# Patient Record
Sex: FEMALE | Race: WHITE | NOT HISPANIC OR LATINO | Employment: OTHER | URBAN - METROPOLITAN AREA
[De-identification: names, ages, dates, MRNs, and addresses within clinical notes are randomized per-mention and may not be internally consistent; named-entity substitution may affect disease eponyms.]

---

## 2017-03-09 ENCOUNTER — AMBULATORY - HEALTHEAST (OUTPATIENT)
Dept: CARDIOLOGY | Facility: CLINIC | Age: 62
End: 2017-03-09

## 2017-03-09 DIAGNOSIS — Z95.0 PACEMAKER: ICD-10-CM

## 2017-04-12 ENCOUNTER — AMBULATORY - HEALTHEAST (OUTPATIENT)
Dept: CARDIOLOGY | Facility: CLINIC | Age: 62
End: 2017-04-12

## 2017-04-12 DIAGNOSIS — Z95.0 PACEMAKER: ICD-10-CM

## 2017-04-18 ENCOUNTER — AMBULATORY - HEALTHEAST (OUTPATIENT)
Dept: CARDIOLOGY | Facility: CLINIC | Age: 62
End: 2017-04-18

## 2017-04-18 ENCOUNTER — OFFICE VISIT - HEALTHEAST (OUTPATIENT)
Dept: CARDIOLOGY | Facility: CLINIC | Age: 62
End: 2017-04-18

## 2017-04-18 DIAGNOSIS — I44.7 OTHER LEFT BUNDLE BRANCH BLOCK: ICD-10-CM

## 2017-04-18 DIAGNOSIS — I44.2 ATRIOVENTRICULAR BLOCK, COMPLETE (H): ICD-10-CM

## 2017-04-18 DIAGNOSIS — Z95.0 CARDIAC PACEMAKER IN SITU: ICD-10-CM

## 2017-04-18 DIAGNOSIS — R55 SYNCOPAL EPISODES: ICD-10-CM

## 2017-04-18 DIAGNOSIS — I10 ESSENTIAL HYPERTENSION WITH GOAL BLOOD PRESSURE LESS THAN 130/80: ICD-10-CM

## 2017-04-18 DIAGNOSIS — E78.00 HYPERCHOLESTEREMIA: ICD-10-CM

## 2017-04-18 ASSESSMENT — MIFFLIN-ST. JEOR: SCORE: 1407.02

## 2017-07-26 ENCOUNTER — AMBULATORY - HEALTHEAST (OUTPATIENT)
Dept: CARDIOLOGY | Facility: CLINIC | Age: 62
End: 2017-07-26

## 2017-07-26 DIAGNOSIS — Z95.0 CARDIAC PACEMAKER IN SITU: ICD-10-CM

## 2017-07-27 LAB — HCC DEVICE COMMENTS: NORMAL

## 2017-11-02 ENCOUNTER — AMBULATORY - HEALTHEAST (OUTPATIENT)
Dept: CARDIOLOGY | Facility: CLINIC | Age: 62
End: 2017-11-02

## 2017-11-02 DIAGNOSIS — Z95.0 CARDIAC PACEMAKER IN SITU: ICD-10-CM

## 2017-11-03 LAB — HCC DEVICE COMMENTS: NORMAL

## 2017-11-20 ENCOUNTER — RECORDS - HEALTHEAST (OUTPATIENT)
Dept: LAB | Facility: HOSPITAL | Age: 62
End: 2017-11-20

## 2017-11-20 LAB — AST SERPL W P-5'-P-CCNC: 22 U/L (ref 0–40)

## 2018-02-14 ENCOUNTER — AMBULATORY - HEALTHEAST (OUTPATIENT)
Dept: CARDIOLOGY | Facility: CLINIC | Age: 63
End: 2018-02-14

## 2018-02-14 DIAGNOSIS — Z95.0 CARDIAC PACEMAKER IN SITU: ICD-10-CM

## 2018-02-15 LAB — HCC DEVICE COMMENTS: NORMAL

## 2018-03-28 ENCOUNTER — RECORDS - HEALTHEAST (OUTPATIENT)
Dept: LAB | Facility: HOSPITAL | Age: 63
End: 2018-03-28

## 2018-03-28 LAB — PHENYTOIN SERPL-MCNC: 13.2 UG/ML (ref 10–20)

## 2018-05-01 ENCOUNTER — RECORDS - HEALTHEAST (OUTPATIENT)
Dept: ADMINISTRATIVE | Facility: OTHER | Age: 63
End: 2018-05-01

## 2018-05-07 ENCOUNTER — OFFICE VISIT - HEALTHEAST (OUTPATIENT)
Dept: CARDIOLOGY | Facility: CLINIC | Age: 63
End: 2018-05-07

## 2018-05-07 ENCOUNTER — AMBULATORY - HEALTHEAST (OUTPATIENT)
Dept: CARDIOLOGY | Facility: CLINIC | Age: 63
End: 2018-05-07

## 2018-05-07 DIAGNOSIS — I10 ESSENTIAL HYPERTENSION: ICD-10-CM

## 2018-05-07 DIAGNOSIS — I44.2 ATRIOVENTRICULAR BLOCK, COMPLETE (H): ICD-10-CM

## 2018-05-07 DIAGNOSIS — Z95.0 CARDIAC PACEMAKER IN SITU: ICD-10-CM

## 2018-05-07 DIAGNOSIS — E78.00 HYPERCHOLESTEREMIA: ICD-10-CM

## 2018-05-07 DIAGNOSIS — R55 SYNCOPAL EPISODES: ICD-10-CM

## 2018-05-07 LAB
HCC DEVICE COMMENTS: NORMAL
HCC DEVICE IMPLANTING PROVIDER: NORMAL
HCC DEVICE MANUFACTURE: NORMAL
HCC DEVICE MODEL: NORMAL
HCC DEVICE SERIAL NUMBER: NORMAL
HCC DEVICE TYPE: NORMAL

## 2018-05-07 ASSESSMENT — MIFFLIN-ST. JEOR: SCORE: 1441.26

## 2018-08-16 ENCOUNTER — AMBULATORY - HEALTHEAST (OUTPATIENT)
Dept: CARDIOLOGY | Facility: CLINIC | Age: 63
End: 2018-08-16

## 2018-08-16 DIAGNOSIS — Z95.0 CARDIAC PACEMAKER IN SITU: ICD-10-CM

## 2018-09-17 ENCOUNTER — RECORDS - HEALTHEAST (OUTPATIENT)
Dept: LAB | Facility: HOSPITAL | Age: 63
End: 2018-09-17

## 2018-09-17 LAB
LEVETIRACETAM (KEPPRA): 8.4 UG/ML (ref 6–46)
PHENYTOIN SERPL-MCNC: 6.4 UG/ML (ref 10–20)

## 2018-11-19 ENCOUNTER — RECORDS - HEALTHEAST (OUTPATIENT)
Dept: LAB | Facility: CLINIC | Age: 63
End: 2018-11-19

## 2018-11-20 LAB
ALBUMIN SERPL-MCNC: 3.7 G/DL (ref 3.5–5)
ALP SERPL-CCNC: 130 U/L (ref 45–120)
ALT SERPL W P-5'-P-CCNC: 19 U/L (ref 0–45)
ANION GAP SERPL CALCULATED.3IONS-SCNC: 9 MMOL/L (ref 5–18)
AST SERPL W P-5'-P-CCNC: 26 U/L (ref 0–40)
BASOPHILS # BLD AUTO: 0 THOU/UL (ref 0–0.2)
BASOPHILS NFR BLD AUTO: 1 % (ref 0–2)
BILIRUB SERPL-MCNC: 0.5 MG/DL (ref 0–1)
BUN SERPL-MCNC: 14 MG/DL (ref 8–22)
CALCIUM SERPL-MCNC: 9.7 MG/DL (ref 8.5–10.5)
CHLORIDE BLD-SCNC: 102 MMOL/L (ref 98–107)
CHOLEST SERPL-MCNC: 248 MG/DL
CO2 SERPL-SCNC: 26 MMOL/L (ref 22–31)
CREAT SERPL-MCNC: 0.73 MG/DL (ref 0.6–1.1)
EOSINOPHIL # BLD AUTO: 0.4 THOU/UL (ref 0–0.4)
EOSINOPHIL NFR BLD AUTO: 6 % (ref 0–6)
ERYTHROCYTE [DISTWIDTH] IN BLOOD BY AUTOMATED COUNT: 13.2 % (ref 11–14.5)
FASTING STATUS PATIENT QL REPORTED: NO
GFR SERPL CREATININE-BSD FRML MDRD: >60 ML/MIN/1.73M2
GLUCOSE BLD-MCNC: 109 MG/DL (ref 70–125)
HCT VFR BLD AUTO: 40.9 % (ref 35–47)
HDLC SERPL-MCNC: 54 MG/DL
HGB BLD-MCNC: 13.7 G/DL (ref 12–16)
LDLC SERPL CALC-MCNC: 158 MG/DL
LEVETIRACETAM (KEPPRA): 9 UG/ML (ref 6–46)
LYMPHOCYTES # BLD AUTO: 2.4 THOU/UL (ref 0.8–4.4)
LYMPHOCYTES NFR BLD AUTO: 34 % (ref 20–40)
MCH RBC QN AUTO: 31.6 PG (ref 27–34)
MCHC RBC AUTO-ENTMCNC: 33.5 G/DL (ref 32–36)
MCV RBC AUTO: 94 FL (ref 80–100)
MONOCYTES # BLD AUTO: 0.6 THOU/UL (ref 0–0.9)
MONOCYTES NFR BLD AUTO: 8 % (ref 2–10)
NEUTROPHILS # BLD AUTO: 3.6 THOU/UL (ref 2–7.7)
NEUTROPHILS NFR BLD AUTO: 51 % (ref 50–70)
PHENYTOIN SERPL-MCNC: 5.3 UG/ML (ref 10–20)
PLATELET # BLD AUTO: 193 THOU/UL (ref 140–440)
PMV BLD AUTO: 10.1 FL (ref 8.5–12.5)
POTASSIUM BLD-SCNC: 3 MMOL/L (ref 3.5–5)
PROT SERPL-MCNC: 6.8 G/DL (ref 6–8)
RBC # BLD AUTO: 4.34 MILL/UL (ref 3.8–5.4)
SODIUM SERPL-SCNC: 137 MMOL/L (ref 136–145)
TRIGL SERPL-MCNC: 179 MG/DL
TSH SERPL DL<=0.005 MIU/L-ACNC: 14.84 UIU/ML (ref 0.3–5)
WBC: 7 THOU/UL (ref 4–11)

## 2018-11-26 ENCOUNTER — RECORDS - HEALTHEAST (OUTPATIENT)
Dept: LAB | Facility: CLINIC | Age: 63
End: 2018-11-26

## 2018-11-26 LAB
C DIFF TOX B STL QL: NEGATIVE
RIBOTYPE 027/NAP1/BI: NORMAL

## 2018-11-27 ENCOUNTER — AMBULATORY - HEALTHEAST (OUTPATIENT)
Dept: CARDIOLOGY | Facility: CLINIC | Age: 63
End: 2018-11-27

## 2018-11-27 DIAGNOSIS — Z95.0 CARDIAC PACEMAKER IN SITU: ICD-10-CM

## 2018-11-27 LAB
O+P STL MICRO: NORMAL
SHIGA TOXIN 1: NEGATIVE
SHIGA TOXIN 2: NEGATIVE

## 2018-11-29 LAB — BACTERIA SPEC CULT: NORMAL

## 2019-03-06 ENCOUNTER — AMBULATORY - HEALTHEAST (OUTPATIENT)
Dept: CARDIOLOGY | Facility: CLINIC | Age: 64
End: 2019-03-06

## 2019-03-06 DIAGNOSIS — Z95.0 CARDIAC PACEMAKER IN SITU: ICD-10-CM

## 2019-04-09 ENCOUNTER — AMBULATORY - HEALTHEAST (OUTPATIENT)
Dept: CARDIOLOGY | Facility: CLINIC | Age: 64
End: 2019-04-09

## 2019-04-09 DIAGNOSIS — Z95.0 CARDIAC PACEMAKER IN SITU: ICD-10-CM

## 2019-04-29 ENCOUNTER — RECORDS - HEALTHEAST (OUTPATIENT)
Dept: LAB | Facility: CLINIC | Age: 64
End: 2019-04-29

## 2019-04-29 LAB
ALBUMIN SERPL-MCNC: 3.8 G/DL (ref 3.5–5)
ALP SERPL-CCNC: 121 U/L (ref 45–120)
ALT SERPL W P-5'-P-CCNC: 19 U/L (ref 0–45)
ANION GAP SERPL CALCULATED.3IONS-SCNC: 13 MMOL/L (ref 5–18)
AST SERPL W P-5'-P-CCNC: 20 U/L (ref 0–40)
BASOPHILS # BLD AUTO: 0 THOU/UL (ref 0–0.2)
BASOPHILS NFR BLD AUTO: 0 % (ref 0–2)
BILIRUB SERPL-MCNC: 0.3 MG/DL (ref 0–1)
BUN SERPL-MCNC: 18 MG/DL (ref 8–22)
CALCIUM SERPL-MCNC: 10.3 MG/DL (ref 8.5–10.5)
CHLORIDE BLD-SCNC: 107 MMOL/L (ref 98–107)
CHOLEST SERPL-MCNC: 183 MG/DL
CO2 SERPL-SCNC: 22 MMOL/L (ref 22–31)
CREAT SERPL-MCNC: 0.76 MG/DL (ref 0.6–1.1)
EOSINOPHIL # BLD AUTO: 0.3 THOU/UL (ref 0–0.4)
EOSINOPHIL NFR BLD AUTO: 7 % (ref 0–6)
ERYTHROCYTE [DISTWIDTH] IN BLOOD BY AUTOMATED COUNT: 13 % (ref 11–14.5)
FASTING STATUS PATIENT QL REPORTED: YES
GFR SERPL CREATININE-BSD FRML MDRD: >60 ML/MIN/1.73M2
GLUCOSE BLD-MCNC: 111 MG/DL (ref 70–125)
HCT VFR BLD AUTO: 41 % (ref 35–47)
HDLC SERPL-MCNC: 44 MG/DL
HGB BLD-MCNC: 13.5 G/DL (ref 12–16)
LDLC SERPL CALC-MCNC: 112 MG/DL
LEVETIRACETAM (KEPPRA): 23.4 UG/ML (ref 6–46)
LYMPHOCYTES # BLD AUTO: 1.6 THOU/UL (ref 0.8–4.4)
LYMPHOCYTES NFR BLD AUTO: 33 % (ref 20–40)
MCH RBC QN AUTO: 30.8 PG (ref 27–34)
MCHC RBC AUTO-ENTMCNC: 32.9 G/DL (ref 32–36)
MCV RBC AUTO: 93 FL (ref 80–100)
MONOCYTES # BLD AUTO: 0.4 THOU/UL (ref 0–0.9)
MONOCYTES NFR BLD AUTO: 8 % (ref 2–10)
NEUTROPHILS # BLD AUTO: 2.6 THOU/UL (ref 2–7.7)
NEUTROPHILS NFR BLD AUTO: 53 % (ref 50–70)
PHENYTOIN SERPL-MCNC: 9.9 UG/ML (ref 10–20)
PLATELET # BLD AUTO: 175 THOU/UL (ref 140–440)
PMV BLD AUTO: 10.5 FL (ref 8.5–12.5)
POTASSIUM BLD-SCNC: 3.8 MMOL/L (ref 3.5–5)
PROT SERPL-MCNC: 6.6 G/DL (ref 6–8)
RBC # BLD AUTO: 4.39 MILL/UL (ref 3.8–5.4)
SODIUM SERPL-SCNC: 142 MMOL/L (ref 136–145)
TRIGL SERPL-MCNC: 133 MG/DL
TSH SERPL DL<=0.005 MIU/L-ACNC: <0.01 UIU/ML (ref 0.3–5)
WBC: 5 THOU/UL (ref 4–11)

## 2019-05-06 LAB
HPV SOURCE: NORMAL
HUMAN PAPILLOMA VIRUS 16 DNA: NEGATIVE
HUMAN PAPILLOMA VIRUS 18 DNA: NEGATIVE
HUMAN PAPILLOMA VIRUS FINAL DIAGNOSIS: NORMAL
HUMAN PAPILLOMA VIRUS OTHER HR: NEGATIVE
SPECIMEN DESCRIPTION: NORMAL

## 2019-05-10 ENCOUNTER — RECORDS - HEALTHEAST (OUTPATIENT)
Dept: ADMINISTRATIVE | Facility: OTHER | Age: 64
End: 2019-05-10

## 2019-05-10 LAB
BKR LAB AP ABNORMAL BLEEDING: NO
BKR LAB AP BIRTH CONTROL/HORMONES: ABNORMAL
BKR LAB AP CERVICAL APPEARANCE: NORMAL
BKR LAB AP GYN ADEQUACY: ABNORMAL
BKR LAB AP GYN INTERPRETATION: ABNORMAL
BKR LAB AP HPV REFLEX: ABNORMAL
BKR LAB AP LMP: ABNORMAL
BKR LAB AP PATIENT STATUS: NO
BKR LAB AP PREVIOUS ABNORMAL: ABNORMAL
BKR LAB AP PREVIOUS NORMAL: ABNORMAL
HIGH RISK?: NO
INTERPRETING LAB: ABNORMAL
PATH REPORT.COMMENTS IMP SPEC: ABNORMAL
RESULT FLAG (HE HISTORICAL CONVERSION): ABNORMAL

## 2019-05-13 ENCOUNTER — AMBULATORY - HEALTHEAST (OUTPATIENT)
Dept: CARDIOLOGY | Facility: CLINIC | Age: 64
End: 2019-05-13

## 2019-05-13 DIAGNOSIS — Z95.0 CARDIAC PACEMAKER IN SITU: ICD-10-CM

## 2019-05-24 ENCOUNTER — RECORDS - HEALTHEAST (OUTPATIENT)
Dept: ADMINISTRATIVE | Facility: OTHER | Age: 64
End: 2019-05-24

## 2019-05-24 LAB
LAB AP CHARGES (HE HISTORICAL CONVERSION): NORMAL
PATH REPORT.COMMENTS IMP SPEC: NORMAL
PATH REPORT.COMMENTS IMP SPEC: NORMAL
PATH REPORT.FINAL DX SPEC: NORMAL
PATH REPORT.GROSS SPEC: NORMAL
PATH REPORT.MICROSCOPIC SPEC OTHER STN: NORMAL
PATH REPORT.RELEVANT HX SPEC: NORMAL
RESULT FLAG (HE HISTORICAL CONVERSION): NORMAL

## 2019-06-05 ENCOUNTER — AMBULATORY - HEALTHEAST (OUTPATIENT)
Dept: CARDIOLOGY | Facility: CLINIC | Age: 64
End: 2019-06-05

## 2019-06-05 ENCOUNTER — RECORDS - HEALTHEAST (OUTPATIENT)
Dept: ADMINISTRATIVE | Facility: OTHER | Age: 64
End: 2019-06-05

## 2019-06-11 ENCOUNTER — AMBULATORY - HEALTHEAST (OUTPATIENT)
Dept: CARDIOLOGY | Facility: CLINIC | Age: 64
End: 2019-06-11

## 2019-06-11 ENCOUNTER — OFFICE VISIT - HEALTHEAST (OUTPATIENT)
Dept: CARDIOLOGY | Facility: CLINIC | Age: 64
End: 2019-06-11

## 2019-06-11 DIAGNOSIS — Z95.0 CARDIAC PACEMAKER IN SITU: ICD-10-CM

## 2019-06-11 DIAGNOSIS — I10 ESSENTIAL HYPERTENSION: ICD-10-CM

## 2019-06-11 DIAGNOSIS — I44.2 ATRIOVENTRICULAR BLOCK, COMPLETE (H): ICD-10-CM

## 2019-06-11 DIAGNOSIS — G47.33 OBSTRUCTIVE SLEEP APNEA (ADULT) (PEDIATRIC): ICD-10-CM

## 2019-06-11 DIAGNOSIS — E78.00 HYPERCHOLESTEREMIA: ICD-10-CM

## 2019-06-11 ASSESSMENT — MIFFLIN-ST. JEOR: SCORE: 1364.15

## 2019-06-12 ENCOUNTER — HOSPITAL ENCOUNTER (OUTPATIENT)
Dept: MAMMOGRAPHY | Facility: CLINIC | Age: 64
Discharge: HOME | End: 2019-06-12
Attending: FAMILY MEDICINE

## 2019-06-12 DIAGNOSIS — Z12.31 VISIT FOR SCREENING MAMMOGRAM: ICD-10-CM

## 2019-07-10 ENCOUNTER — AMBULATORY - HEALTHEAST (OUTPATIENT)
Dept: CARDIOLOGY | Facility: CLINIC | Age: 64
End: 2019-07-10

## 2019-07-10 DIAGNOSIS — Z95.0 CARDIAC PACEMAKER IN SITU: ICD-10-CM

## 2019-07-11 ENCOUNTER — AMBULATORY - HEALTHEAST (OUTPATIENT)
Dept: CARDIOLOGY | Facility: CLINIC | Age: 64
End: 2019-07-11

## 2019-07-11 ENCOUNTER — COMMUNICATION - HEALTHEAST (OUTPATIENT)
Dept: CARDIOLOGY | Facility: CLINIC | Age: 64
End: 2019-07-11

## 2019-07-11 ENCOUNTER — SURGERY - HEALTHEAST (OUTPATIENT)
Dept: CARDIOLOGY | Facility: CLINIC | Age: 64
End: 2019-07-11

## 2019-07-11 DIAGNOSIS — Z45.010 PACEMAKER BATTERY DEPLETION: ICD-10-CM

## 2019-07-12 ENCOUNTER — COMMUNICATION - HEALTHEAST (OUTPATIENT)
Dept: CARDIOLOGY | Facility: CLINIC | Age: 64
End: 2019-07-12

## 2019-07-12 ENCOUNTER — SURGERY - HEALTHEAST (OUTPATIENT)
Dept: CARDIOLOGY | Facility: CLINIC | Age: 64
End: 2019-07-12

## 2019-07-12 ENCOUNTER — RECORDS - HEALTHEAST (OUTPATIENT)
Dept: LAB | Facility: CLINIC | Age: 64
End: 2019-07-12

## 2019-07-12 LAB
POTASSIUM BLD-SCNC: 3.9 MMOL/L (ref 3.5–5)
TSH SERPL DL<=0.005 MIU/L-ACNC: 0.02 UIU/ML (ref 0.3–5)

## 2019-07-15 ENCOUNTER — AMBULATORY - HEALTHEAST (OUTPATIENT)
Dept: CARDIOLOGY | Facility: CLINIC | Age: 64
End: 2019-07-15

## 2019-07-15 ENCOUNTER — SURGERY - HEALTHEAST (OUTPATIENT)
Dept: CARDIOLOGY | Facility: CLINIC | Age: 64
End: 2019-07-15

## 2019-07-15 ASSESSMENT — MIFFLIN-ST. JEOR: SCORE: 1363.25

## 2019-07-18 ENCOUNTER — COMMUNICATION - HEALTHEAST (OUTPATIENT)
Dept: CARDIOLOGY | Facility: CLINIC | Age: 64
End: 2019-07-18

## 2019-07-22 ENCOUNTER — AMBULATORY - HEALTHEAST (OUTPATIENT)
Dept: CARDIOLOGY | Facility: CLINIC | Age: 64
End: 2019-07-22

## 2019-07-22 DIAGNOSIS — Z95.0 CARDIAC PACEMAKER IN SITU: ICD-10-CM

## 2019-07-22 ASSESSMENT — MIFFLIN-ST. JEOR: SCORE: 1360.75

## 2019-07-23 ENCOUNTER — COMMUNICATION - HEALTHEAST (OUTPATIENT)
Dept: CARDIOLOGY | Facility: CLINIC | Age: 64
End: 2019-07-23

## 2019-07-31 ENCOUNTER — COMMUNICATION - HEALTHEAST (OUTPATIENT)
Dept: CARDIOLOGY | Facility: CLINIC | Age: 64
End: 2019-07-31

## 2019-08-02 ENCOUNTER — SURGERY - HEALTHEAST (OUTPATIENT)
Dept: CARDIOLOGY | Facility: CLINIC | Age: 64
End: 2019-08-02

## 2019-09-27 ENCOUNTER — RECORDS - HEALTHEAST (OUTPATIENT)
Dept: ADMINISTRATIVE | Facility: OTHER | Age: 64
End: 2019-09-27

## 2019-10-15 ENCOUNTER — AMBULATORY - HEALTHEAST (OUTPATIENT)
Dept: CARDIOLOGY | Facility: CLINIC | Age: 64
End: 2019-10-15

## 2019-10-15 DIAGNOSIS — Z95.0 CARDIAC PACEMAKER IN SITU: ICD-10-CM

## 2019-10-16 ENCOUNTER — AMBULATORY - HEALTHEAST (OUTPATIENT)
Dept: PHYSICAL MEDICINE AND REHAB | Facility: CLINIC | Age: 64
End: 2019-10-16

## 2019-10-16 ENCOUNTER — RECORDS - HEALTHEAST (OUTPATIENT)
Dept: ADMINISTRATIVE | Facility: OTHER | Age: 64
End: 2019-10-16

## 2019-10-16 DIAGNOSIS — M54.6 ACUTE MIDLINE THORACIC BACK PAIN: ICD-10-CM

## 2019-11-08 ENCOUNTER — HOSPITAL ENCOUNTER (OUTPATIENT)
Dept: PHYSICAL MEDICINE AND REHAB | Facility: CLINIC | Age: 64
Discharge: HOME | End: 2019-11-08
Attending: FAMILY MEDICINE

## 2019-11-08 DIAGNOSIS — R29.898 WEAKNESS OF BOTH LOWER EXTREMITIES: ICD-10-CM

## 2019-11-08 DIAGNOSIS — Z98.1 HISTORY OF LUMBAR FUSION: ICD-10-CM

## 2019-11-08 DIAGNOSIS — M54.14 RADICULAR PAIN OF THORACIC REGION: ICD-10-CM

## 2019-11-08 DIAGNOSIS — M51.24 THORACIC DISC HERNIATION: ICD-10-CM

## 2019-11-08 DIAGNOSIS — R26.89 BALANCE PROBLEM: ICD-10-CM

## 2019-11-08 ASSESSMENT — MIFFLIN-ST. JEOR: SCORE: 1341.24

## 2019-11-18 ENCOUNTER — COMMUNICATION - HEALTHEAST (OUTPATIENT)
Dept: PHYSICAL MEDICINE AND REHAB | Facility: CLINIC | Age: 64
End: 2019-11-18

## 2019-11-19 ENCOUNTER — OFFICE VISIT - HEALTHEAST (OUTPATIENT)
Dept: PHYSICAL THERAPY | Facility: REHABILITATION | Age: 64
End: 2019-11-19

## 2019-11-19 DIAGNOSIS — R26.81 UNSTEADINESS ON FEET: ICD-10-CM

## 2019-11-19 DIAGNOSIS — M54.6 CHRONIC MIDLINE THORACIC BACK PAIN: ICD-10-CM

## 2019-11-19 DIAGNOSIS — G89.29 CHRONIC MIDLINE THORACIC BACK PAIN: ICD-10-CM

## 2019-11-19 DIAGNOSIS — M62.81 GENERALIZED MUSCLE WEAKNESS: ICD-10-CM

## 2019-11-19 DIAGNOSIS — R26.89 ANTALGIC GAIT: ICD-10-CM

## 2019-11-21 ENCOUNTER — OFFICE VISIT - HEALTHEAST (OUTPATIENT)
Dept: PHYSICAL THERAPY | Facility: REHABILITATION | Age: 64
End: 2019-11-21

## 2019-11-21 DIAGNOSIS — G89.29 CHRONIC MIDLINE THORACIC BACK PAIN: ICD-10-CM

## 2019-11-21 DIAGNOSIS — M62.81 GENERALIZED MUSCLE WEAKNESS: ICD-10-CM

## 2019-11-21 DIAGNOSIS — R26.81 UNSTEADINESS ON FEET: ICD-10-CM

## 2019-11-21 DIAGNOSIS — M54.6 CHRONIC MIDLINE THORACIC BACK PAIN: ICD-10-CM

## 2019-11-21 DIAGNOSIS — R26.89 ANTALGIC GAIT: ICD-10-CM

## 2019-11-26 ENCOUNTER — OFFICE VISIT - HEALTHEAST (OUTPATIENT)
Dept: PHYSICAL THERAPY | Facility: REHABILITATION | Age: 64
End: 2019-11-26

## 2019-11-26 DIAGNOSIS — R26.89 ANTALGIC GAIT: ICD-10-CM

## 2019-11-26 DIAGNOSIS — G89.29 CHRONIC MIDLINE THORACIC BACK PAIN: ICD-10-CM

## 2019-11-26 DIAGNOSIS — M62.81 GENERALIZED MUSCLE WEAKNESS: ICD-10-CM

## 2019-11-26 DIAGNOSIS — R26.81 UNSTEADINESS ON FEET: ICD-10-CM

## 2019-11-26 DIAGNOSIS — M54.6 CHRONIC MIDLINE THORACIC BACK PAIN: ICD-10-CM

## 2019-12-05 ENCOUNTER — HOSPITAL ENCOUNTER (OUTPATIENT)
Dept: PHYSICAL MEDICINE AND REHAB | Facility: CLINIC | Age: 64
Discharge: HOME | End: 2019-12-05
Attending: PHYSICIAN ASSISTANT

## 2019-12-05 DIAGNOSIS — R29.898 WEAKNESS OF BOTH LOWER EXTREMITIES: ICD-10-CM

## 2019-12-05 DIAGNOSIS — Z98.1 HISTORY OF LUMBAR FUSION: ICD-10-CM

## 2019-12-05 DIAGNOSIS — M51.24 THORACIC DISC HERNIATION: ICD-10-CM

## 2019-12-05 DIAGNOSIS — M54.14 RADICULAR PAIN OF THORACIC REGION: ICD-10-CM

## 2019-12-05 DIAGNOSIS — M54.50 LUMBAR SPINE PAIN: ICD-10-CM

## 2019-12-05 DIAGNOSIS — Q76.49 CONGENITAL HEMIVERTEBRA: ICD-10-CM

## 2019-12-05 ASSESSMENT — MIFFLIN-ST. JEOR: SCORE: 1338.07

## 2019-12-09 ENCOUNTER — OFFICE VISIT - HEALTHEAST (OUTPATIENT)
Dept: PHYSICAL THERAPY | Facility: REHABILITATION | Age: 64
End: 2019-12-09

## 2019-12-09 DIAGNOSIS — M54.6 CHRONIC MIDLINE THORACIC BACK PAIN: ICD-10-CM

## 2019-12-09 DIAGNOSIS — G89.29 CHRONIC MIDLINE THORACIC BACK PAIN: ICD-10-CM

## 2019-12-09 DIAGNOSIS — R26.81 UNSTEADINESS ON FEET: ICD-10-CM

## 2019-12-09 DIAGNOSIS — M62.81 GENERALIZED MUSCLE WEAKNESS: ICD-10-CM

## 2019-12-09 DIAGNOSIS — R26.89 ANTALGIC GAIT: ICD-10-CM

## 2019-12-19 ENCOUNTER — OFFICE VISIT - HEALTHEAST (OUTPATIENT)
Dept: PHYSICAL THERAPY | Facility: REHABILITATION | Age: 64
End: 2019-12-19

## 2019-12-19 DIAGNOSIS — M62.81 GENERALIZED MUSCLE WEAKNESS: ICD-10-CM

## 2019-12-19 DIAGNOSIS — G89.29 CHRONIC MIDLINE THORACIC BACK PAIN: ICD-10-CM

## 2019-12-19 DIAGNOSIS — R26.89 ANTALGIC GAIT: ICD-10-CM

## 2019-12-19 DIAGNOSIS — R26.81 UNSTEADINESS ON FEET: ICD-10-CM

## 2019-12-19 DIAGNOSIS — M54.6 CHRONIC MIDLINE THORACIC BACK PAIN: ICD-10-CM

## 2019-12-26 ENCOUNTER — OFFICE VISIT - HEALTHEAST (OUTPATIENT)
Dept: PHYSICAL THERAPY | Facility: REHABILITATION | Age: 64
End: 2019-12-26

## 2019-12-26 DIAGNOSIS — R26.89 ANTALGIC GAIT: ICD-10-CM

## 2019-12-26 DIAGNOSIS — M54.6 CHRONIC MIDLINE THORACIC BACK PAIN: ICD-10-CM

## 2019-12-26 DIAGNOSIS — M62.81 GENERALIZED MUSCLE WEAKNESS: ICD-10-CM

## 2019-12-26 DIAGNOSIS — R26.81 UNSTEADINESS ON FEET: ICD-10-CM

## 2019-12-26 DIAGNOSIS — G89.29 CHRONIC MIDLINE THORACIC BACK PAIN: ICD-10-CM

## 2019-12-27 ENCOUNTER — RECORDS - HEALTHEAST (OUTPATIENT)
Dept: ADMINISTRATIVE | Facility: OTHER | Age: 64
End: 2019-12-27

## 2019-12-27 LAB
BKR LAB AP ABNORMAL BLEEDING: NO
BKR LAB AP BIRTH CONTROL/HORMONES: ABNORMAL
BKR LAB AP CERVICAL APPEARANCE: NORMAL
BKR LAB AP GYN ADEQUACY: ABNORMAL
BKR LAB AP GYN INTERPRETATION: ABNORMAL
BKR LAB AP HPV REFLEX: ABNORMAL
BKR LAB AP LMP: ABNORMAL
BKR LAB AP PATIENT STATUS: ABNORMAL
BKR LAB AP PREVIOUS ABNORMAL: ABNORMAL
BKR LAB AP PREVIOUS NORMAL: ABNORMAL
HIGH RISK?: YES
PATH REPORT.COMMENTS IMP SPEC: ABNORMAL
RESULT FLAG (HE HISTORICAL CONVERSION): ABNORMAL

## 2020-01-02 ENCOUNTER — OFFICE VISIT - HEALTHEAST (OUTPATIENT)
Dept: PHYSICAL THERAPY | Facility: REHABILITATION | Age: 65
End: 2020-01-02

## 2020-01-02 DIAGNOSIS — R26.89 ANTALGIC GAIT: ICD-10-CM

## 2020-01-02 DIAGNOSIS — R26.81 UNSTEADINESS ON FEET: ICD-10-CM

## 2020-01-02 DIAGNOSIS — M54.6 CHRONIC MIDLINE THORACIC BACK PAIN: ICD-10-CM

## 2020-01-02 DIAGNOSIS — G89.29 CHRONIC MIDLINE THORACIC BACK PAIN: ICD-10-CM

## 2020-01-02 DIAGNOSIS — M62.81 GENERALIZED MUSCLE WEAKNESS: ICD-10-CM

## 2020-01-07 ENCOUNTER — OFFICE VISIT - HEALTHEAST (OUTPATIENT)
Dept: PHYSICAL THERAPY | Facility: REHABILITATION | Age: 65
End: 2020-01-07

## 2020-01-07 DIAGNOSIS — M62.81 GENERALIZED MUSCLE WEAKNESS: ICD-10-CM

## 2020-01-07 DIAGNOSIS — R26.89 ANTALGIC GAIT: ICD-10-CM

## 2020-01-07 DIAGNOSIS — G89.29 CHRONIC MIDLINE THORACIC BACK PAIN: ICD-10-CM

## 2020-01-07 DIAGNOSIS — M54.6 CHRONIC MIDLINE THORACIC BACK PAIN: ICD-10-CM

## 2020-01-07 DIAGNOSIS — R26.81 UNSTEADINESS ON FEET: ICD-10-CM

## 2020-01-16 ENCOUNTER — HOSPITAL ENCOUNTER (OUTPATIENT)
Dept: PHYSICAL MEDICINE AND REHAB | Facility: CLINIC | Age: 65
Discharge: HOME | End: 2020-01-16
Attending: PHYSICIAN ASSISTANT

## 2020-01-16 ENCOUNTER — AMBULATORY - HEALTHEAST (OUTPATIENT)
Dept: CARDIOLOGY | Facility: CLINIC | Age: 65
End: 2020-01-16

## 2020-01-16 DIAGNOSIS — I49.5 SSS (SICK SINUS SYNDROME) (H): ICD-10-CM

## 2020-01-16 DIAGNOSIS — Z95.0 CARDIAC PACEMAKER IN SITU: ICD-10-CM

## 2020-01-16 DIAGNOSIS — M54.50 LUMBAR SPINE PAIN: ICD-10-CM

## 2020-01-16 DIAGNOSIS — M51.24 THORACIC DISC HERNIATION: ICD-10-CM

## 2020-01-16 DIAGNOSIS — Q76.49 CONGENITAL HEMIVERTEBRA: ICD-10-CM

## 2020-01-16 DIAGNOSIS — M25.551 HIP PAIN, RIGHT: ICD-10-CM

## 2020-01-16 DIAGNOSIS — M54.14 RADICULAR PAIN OF THORACIC REGION: ICD-10-CM

## 2020-01-20 ENCOUNTER — OFFICE VISIT - HEALTHEAST (OUTPATIENT)
Dept: PHYSICAL THERAPY | Facility: REHABILITATION | Age: 65
End: 2020-01-20

## 2020-01-20 DIAGNOSIS — R26.81 UNSTEADINESS ON FEET: ICD-10-CM

## 2020-01-20 DIAGNOSIS — M62.81 GENERALIZED MUSCLE WEAKNESS: ICD-10-CM

## 2020-01-20 DIAGNOSIS — R26.89 ANTALGIC GAIT: ICD-10-CM

## 2020-01-20 DIAGNOSIS — G89.29 CHRONIC MIDLINE THORACIC BACK PAIN: ICD-10-CM

## 2020-01-20 DIAGNOSIS — M54.6 CHRONIC MIDLINE THORACIC BACK PAIN: ICD-10-CM

## 2020-01-28 ENCOUNTER — OFFICE VISIT - HEALTHEAST (OUTPATIENT)
Dept: PHYSICAL THERAPY | Facility: REHABILITATION | Age: 65
End: 2020-01-28

## 2020-01-28 DIAGNOSIS — G89.29 CHRONIC MIDLINE THORACIC BACK PAIN: ICD-10-CM

## 2020-01-28 DIAGNOSIS — M62.81 GENERALIZED MUSCLE WEAKNESS: ICD-10-CM

## 2020-01-28 DIAGNOSIS — M54.6 CHRONIC MIDLINE THORACIC BACK PAIN: ICD-10-CM

## 2020-01-28 DIAGNOSIS — R26.81 UNSTEADINESS ON FEET: ICD-10-CM

## 2020-01-28 DIAGNOSIS — R26.89 ANTALGIC GAIT: ICD-10-CM

## 2020-02-11 ENCOUNTER — OFFICE VISIT - HEALTHEAST (OUTPATIENT)
Dept: PHYSICAL THERAPY | Facility: REHABILITATION | Age: 65
End: 2020-02-11

## 2020-02-11 DIAGNOSIS — G89.29 CHRONIC MIDLINE THORACIC BACK PAIN: ICD-10-CM

## 2020-02-11 DIAGNOSIS — R26.89 ANTALGIC GAIT: ICD-10-CM

## 2020-02-11 DIAGNOSIS — R26.81 UNSTEADINESS ON FEET: ICD-10-CM

## 2020-02-11 DIAGNOSIS — M62.81 GENERALIZED MUSCLE WEAKNESS: ICD-10-CM

## 2020-02-11 DIAGNOSIS — M54.6 CHRONIC MIDLINE THORACIC BACK PAIN: ICD-10-CM

## 2020-03-05 ENCOUNTER — COMMUNICATION - HEALTHEAST (OUTPATIENT)
Dept: PHYSICAL MEDICINE AND REHAB | Facility: CLINIC | Age: 65
End: 2020-03-05

## 2020-03-05 DIAGNOSIS — M54.14 RADICULAR PAIN OF THORACIC REGION: ICD-10-CM

## 2020-03-05 DIAGNOSIS — M51.24 THORACIC DISC HERNIATION: ICD-10-CM

## 2020-04-16 ENCOUNTER — AMBULATORY - HEALTHEAST (OUTPATIENT)
Dept: CARDIOLOGY | Facility: CLINIC | Age: 65
End: 2020-04-16

## 2020-04-16 DIAGNOSIS — I49.5 SSS (SICK SINUS SYNDROME) (H): ICD-10-CM

## 2020-04-16 DIAGNOSIS — Z95.0 CARDIAC PACEMAKER IN SITU: ICD-10-CM

## 2020-07-10 ENCOUNTER — OFFICE VISIT - HEALTHEAST (OUTPATIENT)
Dept: CARDIOLOGY | Facility: CLINIC | Age: 65
End: 2020-07-10

## 2020-07-10 DIAGNOSIS — I44.7 LEFT BUNDLE BRANCH BLOCK (LBBB): ICD-10-CM

## 2020-07-10 DIAGNOSIS — E78.00 HYPERCHOLESTEREMIA: ICD-10-CM

## 2020-07-10 DIAGNOSIS — Z95.0 CARDIAC PACEMAKER IN SITU: ICD-10-CM

## 2020-07-10 DIAGNOSIS — I10 BENIGN ESSENTIAL HYPERTENSION: ICD-10-CM

## 2020-07-10 DIAGNOSIS — G47.33 OBSTRUCTIVE SLEEP APNEA (ADULT) (PEDIATRIC): ICD-10-CM

## 2020-07-15 ENCOUNTER — COMMUNICATION - HEALTHEAST (OUTPATIENT)
Dept: PHYSICAL MEDICINE AND REHAB | Facility: CLINIC | Age: 65
End: 2020-07-15

## 2020-07-15 ENCOUNTER — AMBULATORY - HEALTHEAST (OUTPATIENT)
Dept: CARDIOLOGY | Facility: CLINIC | Age: 65
End: 2020-07-15

## 2020-07-15 DIAGNOSIS — M54.14 RADICULAR PAIN OF THORACIC REGION: ICD-10-CM

## 2020-07-15 DIAGNOSIS — I44.2 ATRIOVENTRICULAR BLOCK, COMPLETE (H): ICD-10-CM

## 2020-07-15 DIAGNOSIS — Z95.0 CARDIAC PACEMAKER IN SITU: ICD-10-CM

## 2020-07-28 ENCOUNTER — RECORDS - HEALTHEAST (OUTPATIENT)
Dept: LAB | Facility: CLINIC | Age: 65
End: 2020-07-28

## 2020-07-31 LAB — BACTERIA SPEC CULT: ABNORMAL

## 2020-10-19 ENCOUNTER — AMBULATORY - HEALTHEAST (OUTPATIENT)
Dept: CARDIOLOGY | Facility: CLINIC | Age: 65
End: 2020-10-19

## 2020-10-19 DIAGNOSIS — I44.2 ATRIOVENTRICULAR BLOCK, COMPLETE (H): ICD-10-CM

## 2020-10-19 DIAGNOSIS — Z95.0 CARDIAC PACEMAKER IN SITU: ICD-10-CM

## 2020-12-14 ENCOUNTER — RECORDS - HEALTHEAST (OUTPATIENT)
Dept: LAB | Facility: CLINIC | Age: 65
End: 2020-12-14

## 2020-12-14 LAB
ALBUMIN SERPL-MCNC: 3.9 G/DL (ref 3.5–5)
ALP SERPL-CCNC: 122 U/L (ref 45–120)
ALT SERPL W P-5'-P-CCNC: 20 U/L (ref 0–45)
ANION GAP SERPL CALCULATED.3IONS-SCNC: 9 MMOL/L (ref 5–18)
AST SERPL W P-5'-P-CCNC: 21 U/L (ref 0–40)
BASOPHILS # BLD AUTO: 0 THOU/UL (ref 0–0.2)
BASOPHILS NFR BLD AUTO: 0 % (ref 0–2)
BILIRUB SERPL-MCNC: 0.4 MG/DL (ref 0–1)
BUN SERPL-MCNC: 17 MG/DL (ref 8–22)
CALCIUM SERPL-MCNC: 9.5 MG/DL (ref 8.5–10.5)
CHLORIDE BLD-SCNC: 102 MMOL/L (ref 98–107)
CHOLEST SERPL-MCNC: 259 MG/DL
CO2 SERPL-SCNC: 29 MMOL/L (ref 22–31)
CREAT SERPL-MCNC: 0.77 MG/DL (ref 0.6–1.1)
EOSINOPHIL # BLD AUTO: 0.3 THOU/UL (ref 0–0.4)
EOSINOPHIL NFR BLD AUTO: 7 % (ref 0–6)
ERYTHROCYTE [DISTWIDTH] IN BLOOD BY AUTOMATED COUNT: 13 % (ref 11–14.5)
FASTING STATUS PATIENT QL REPORTED: ABNORMAL
GFR SERPL CREATININE-BSD FRML MDRD: >60 ML/MIN/1.73M2
GLUCOSE BLD-MCNC: 171 MG/DL (ref 70–125)
HCT VFR BLD AUTO: 40.4 % (ref 35–47)
HDLC SERPL-MCNC: 57 MG/DL
HGB BLD-MCNC: 13.6 G/DL (ref 12–16)
IMM GRANULOCYTES # BLD: 0 THOU/UL
IMM GRANULOCYTES NFR BLD: 1 %
LDLC SERPL CALC-MCNC: 153 MG/DL
LYMPHOCYTES # BLD AUTO: 2 THOU/UL (ref 0.8–4.4)
LYMPHOCYTES NFR BLD AUTO: 40 % (ref 20–40)
MCH RBC QN AUTO: 32.9 PG (ref 27–34)
MCHC RBC AUTO-ENTMCNC: 33.7 G/DL (ref 32–36)
MCV RBC AUTO: 98 FL (ref 80–100)
MONOCYTES # BLD AUTO: 0.4 THOU/UL (ref 0–0.9)
MONOCYTES NFR BLD AUTO: 8 % (ref 2–10)
NEUTROPHILS # BLD AUTO: 2.3 THOU/UL (ref 2–7.7)
NEUTROPHILS NFR BLD AUTO: 45 % (ref 50–70)
PHENYTOIN SERPL-MCNC: 8.5 UG/ML (ref 10–20)
PLATELET # BLD AUTO: 156 THOU/UL (ref 140–440)
PMV BLD AUTO: 10.3 FL (ref 8.5–12.5)
POTASSIUM BLD-SCNC: 3.7 MMOL/L (ref 3.5–5)
PROT SERPL-MCNC: 6.7 G/DL (ref 6–8)
RBC # BLD AUTO: 4.13 MILL/UL (ref 3.8–5.4)
SODIUM SERPL-SCNC: 140 MMOL/L (ref 136–145)
TRIGL SERPL-MCNC: 243 MG/DL
TSH SERPL DL<=0.005 MIU/L-ACNC: 2.47 UIU/ML (ref 0.3–5)
WBC: 5.1 THOU/UL (ref 4–11)

## 2020-12-23 ENCOUNTER — RECORDS - HEALTHEAST (OUTPATIENT)
Dept: ADMINISTRATIVE | Facility: OTHER | Age: 65
End: 2020-12-23

## 2020-12-23 LAB
BKR LAB AP ABNORMAL BLEEDING: NO
BKR LAB AP BIRTH CONTROL/HORMONES: ABNORMAL
BKR LAB AP CERVICAL APPEARANCE: NORMAL
BKR LAB AP GYN ADEQUACY: ABNORMAL
BKR LAB AP GYN INTERPRETATION: ABNORMAL
BKR LAB AP HPV REFLEX: ABNORMAL
BKR LAB AP LMP: 2002
BKR LAB AP PATIENT STATUS: ABNORMAL
BKR LAB AP PREVIOUS ABNORMAL: ABNORMAL
BKR LAB AP PREVIOUS NORMAL: ABNORMAL
HIGH RISK?: NO
PATH REPORT.COMMENTS IMP SPEC: ABNORMAL
RESULT FLAG (HE HISTORICAL CONVERSION): ABNORMAL

## 2021-01-18 ENCOUNTER — AMBULATORY - HEALTHEAST (OUTPATIENT)
Dept: CARDIOLOGY | Facility: CLINIC | Age: 66
End: 2021-01-18

## 2021-01-18 DIAGNOSIS — I44.2 ATRIOVENTRICULAR BLOCK, COMPLETE (H): ICD-10-CM

## 2021-01-18 DIAGNOSIS — Z95.0 CARDIAC PACEMAKER IN SITU: ICD-10-CM

## 2021-03-31 ENCOUNTER — RECORDS - HEALTHEAST (OUTPATIENT)
Dept: ADMINISTRATIVE | Facility: OTHER | Age: 66
End: 2021-03-31

## 2021-04-06 ENCOUNTER — COMMUNICATION - HEALTHEAST (OUTPATIENT)
Dept: PHYSICAL MEDICINE AND REHAB | Facility: CLINIC | Age: 66
End: 2021-04-06

## 2021-04-06 DIAGNOSIS — M54.14 RADICULAR PAIN OF THORACIC REGION: ICD-10-CM

## 2021-04-06 RX ORDER — GABAPENTIN 300 MG/1
CAPSULE ORAL
Qty: 60 CAPSULE | Refills: 0 | Status: SHIPPED | OUTPATIENT
Start: 2021-04-06 | End: 2021-09-27

## 2021-04-19 ENCOUNTER — AMBULATORY - HEALTHEAST (OUTPATIENT)
Dept: CARDIOLOGY | Facility: CLINIC | Age: 66
End: 2021-04-19

## 2021-04-19 DIAGNOSIS — Z95.0 CARDIAC PACEMAKER IN SITU: ICD-10-CM

## 2021-04-19 DIAGNOSIS — I44.2 ATRIOVENTRICULAR BLOCK, COMPLETE (H): ICD-10-CM

## 2021-05-25 ENCOUNTER — RECORDS - HEALTHEAST (OUTPATIENT)
Dept: ADMINISTRATIVE | Facility: CLINIC | Age: 66
End: 2021-05-25

## 2021-05-27 ENCOUNTER — RECORDS - HEALTHEAST (OUTPATIENT)
Dept: ADMINISTRATIVE | Facility: CLINIC | Age: 66
End: 2021-05-27

## 2021-05-28 ENCOUNTER — RECORDS - HEALTHEAST (OUTPATIENT)
Dept: ADMINISTRATIVE | Facility: CLINIC | Age: 66
End: 2021-05-28

## 2021-05-29 NOTE — PROGRESS NOTES
[facility] Follow-up Note    Assessment & Plan        1. Third Degree A-V Block  -patient had pacemaker implanted in 1993, Medtronic device which she is utilizing 99% of the time in the atria and 1% of the time in the ventricle. She had a generator change out in 2008. Underlying rhythm is possibly sinus bradycardia versus junctional with heart rate around 30 bpm.  Generator change out will be scheduled most likely in the next 6 months since she is on monthly remote checks due to battery lasting only about a month to 2 months more..    2. Obstructive Sleep Apnea -defer to primary.   3. Hypertension -under good control and originating a little on the low side, suspect orthostatic with her recent symptoms in a hot office.  I have asked her to hold HCTZ if she is dehydrated and try to push fluids.   4. Hypercholesteremia - from April 2019 which is acceptable.  Cholesterol 183 and no history of coronary artery disease.     Plan  1.  Hold HCTZ if extremely dehydrated.  2.  Refrain from excessive heat possible.  3.  Monthly device checks and generator change out when applicable.  4.  Follow-up with me in 1 year with device check or sooner if needed.    Subjective  CC: 63-year-old white female being seen in yearly follow-up today.  Over the last week she is noted some orthostatic lightheadedness, especially when getting up from a seated position in her office which is hot at home.  There is however no significant chest discomfort, palpitations, syncope, PND, orthopnea or peripheral edema.    Medications  Current Outpatient Medications   Medication Sig     aspirin 81 MG EC tablet Take 81 mg by mouth daily.     citalopram (CELEXA) 40 MG tablet Take 60 mg by mouth daily.     cycloSPORINE (RESTASIS) 0.05 % ophthalmic emulsion Administer 1 drop to both eyes 2 (two) times a day.     digoxin (LANOXIN) 250 mcg tablet Take 250 mcg by mouth every morning.     DILANTIN 30 mg ER capsule Take 2 capsules by  "mouth every morning.     gabapentin (NEURONTIN) 300 MG capsule Take 600 mg by mouth at bedtime.      hydrochlorothiazide (HYDRODIURIL) 25 MG tablet Take 25 mg by mouth daily.     levETIRAcetam (KEPPRA) 250 MG tablet Take 500 mg by mouth 2 (two) times a day.            levothyroxine (SYNTHROID, LEVOTHROID) 125 MCG tablet Take 125 mcg by mouth Daily at 6:00 am.      peg 400-propylene glycol PF (SYSTANE, PF,) 0.4-0.3 % Dpet Apply 1 drop to eye 3 (three) times a day as needed.     phenytoin (DILANTIN) 100 MG ER capsule Take 100 mg by mouth every morning. Along with 2 - 30 mg capsules     phenytoin (DILANTIN) 100 MG ER capsule Take 200 mg by mouth bedtime.     potassium chloride (MICRO-K) 10 mEq CR capsule Take 10 mEq by mouth 2 (two) times a day.              Objective  BP 98/66 (Patient Site: Right Arm, Patient Position: Sitting, Cuff Size: Adult Large)   Pulse 78   Resp 18   Ht 5' 6\" (1.676 m)   Wt 178 lb (80.7 kg)   BMI 28.73 kg/m      General Appearance:    Alert, cooperative, no distress, appears stated age   Head:    Normocephalic, without obvious abnormality, atraumatic   Throat:   Lips, mucosa, and tongue normal; teeth and gums normal   Neck:   Supple, symmetrical, trachea midline, no adenopathy;        thyroid:  No enlargement/tenderness/nodules; no carotid    bruit or JVD   Back:     Symmetric, no curvature, ROM normal, no CVA tenderness   Lungs:     Clear to auscultation bilaterally, respirations unlabored   Chest wall:    No tenderness, left-sided pacemaker noted   Heart:    Regular rate and rhythm, S1 and S2 normal, no murmur, rub   or gallop   Abdomen:     Soft, non-tender, bowel sounds active all four quadrants,     no masses, no organomegaly   Extremities:   Normal, atraumatic, no cyanosis or edema   Pulses:   2+ and symmetric all extremities   Skin:   Skin color, texture, turgor normal, no rashes or lesions     Results    Lab Results personally reviewed   Lab Results   Component Value Date    CHOL " 183 04/29/2019    CHOL 248 (H) 11/19/2018     Lab Results   Component Value Date    HDL 44 (L) 04/29/2019    HDL 54 11/19/2018     Lab Results   Component Value Date    LDLCALC 112 04/29/2019    LDLCALC 158 (H) 11/19/2018     Lab Results   Component Value Date    TRIG 133 04/29/2019    TRIG 179 (H) 11/19/2018     Lab Results   Component Value Date    WBC 5.0 04/29/2019    HGB 13.5 04/29/2019    HCT 41.0 04/29/2019     04/29/2019     Lab Results   Component Value Date    CREATININE 0.76 04/29/2019    BUN 18 04/29/2019     04/29/2019    K 3.8 04/29/2019    CO2 22 04/29/2019     Review of Systems:   General: WNL  Eyes: WNL  Ears/Nose/Throat: WNL  Lungs: WNL  Heart: WNL  Stomach: WNL  Bladder: WNL  Muscle/Joints: WNL  Skin: WNL  Nervous System: Daytime Sleepiness, Dizziness  Mental Health: WNL     Blood: WNL

## 2021-05-29 NOTE — PATIENT INSTRUCTIONS - HE
Ms Larkin GORDON Love,  I enjoyed visiting with you again today.  I am glad to hear you are doing well.  Per our conversation try to stay well hydtrated and if symptoms worsen let me know.  I will plan on seeing you 1 year a sooner if needed.  Jacob Wick

## 2021-05-29 NOTE — PROGRESS NOTES
In clinic device check with Device RN and Dr. Wick.  Please see link for full device report.  Patient was informed of results and next follow up during today's visit.

## 2021-05-30 ENCOUNTER — RECORDS - HEALTHEAST (OUTPATIENT)
Dept: ADMINISTRATIVE | Facility: CLINIC | Age: 66
End: 2021-05-30

## 2021-05-30 VITALS — WEIGHT: 189.2 LBS | BODY MASS INDEX: 30.41 KG/M2 | HEIGHT: 66 IN

## 2021-05-30 NOTE — PATIENT INSTRUCTIONS - HE
Pacemaker Post-operative Checklist      The Device Registered Nurse (RN) reviewed the pacemaker function.      The Device RN did a wound assessment and wound care teaching.    Please call the Device Nurses with any signs of infection or questions regarding wound healing. Device Nurse Line: [phone], Option #3      The Device RN demonstrated and displayed the specific remote monitoring system for your pacemaker.      The Device RN reviewed the Partnership Agreement Form.    Patient Instructions        To reduce the risk of infection, try to avoid any dental procedures for the first 6 weeks after your pacemaker implant. If you have an emergent or urgent dental need during this time, contact the device clinic for a prescription for an antibiotic.      You will receive a device identification (ID) card in the mail from the device  within 6 weeks to replace the temporary ID card you were given in the hospital.      You may travel by any mode of transportation; just show your pacemaker ID card. You may be subject to a hand search or use of a handheld wand, but official should not keep the wand over the implant site for greater than 5-10 seconds.      For any surgery, let your doctor know you have a pacemaker.       Your pacemaker is Not MRI safe       Most household appliances, including a microwave, will not interfere with your pacemaker function. If you suspect interference, simply move away from the source. Cell phones do not cause a problem. Please refer to the device booklet from the  or their website under the section on electromagnetic interference (IGOR) for further guidelines on things that may interfere with your pacemaker.       Device Clinic Contact Information  Device Nurses: [phone], Option #3   Device Remote Specialists: [phone], Option #2. For questions about your Remote Transmission or Transmission Schedule  Device Schedulers: [phone], Option #1

## 2021-05-30 NOTE — PROGRESS NOTES
DEVICE CLINIC RN POST-OP NOTE    Reason for visit: 1 week post op pacemaker generator change and wound check.      Device: Medtronic [id number] Neena XT DR MRI  Procedure date: 7/15/2019  Implant Diagnosis: SSS, SND  Implanting Physician: Dr. Rod Chadwick        Assessment  Subjective: Pt reports pain and discomfort at the pacemaker location since implant. Reports the pain is 7/10 today. She is using ice occasionally and ibuprofen here and there.   Vitals:   Vitals:    07/22/19 0828   BP: 118/78   Pulse: 80   Resp: 16   Temp: 97.7  F (36.5  C)     Incision/device pocket: The staples were removed from the incision and it was cleansed with adhesive remover and alcohol wipes. The incision is clean, dry and intact. There are no signs of infection present. The incision is well healed. There is mild swelling over the device itself and along the ridge of the incision.    Other: I advised she increase her amount of ibuprofen, discussed recommended safe dosages and add in tylenol. She reports that tylenol does not work for her, however, I told her they work in conjunction with each other. I also advised that she use ice more often for pain. She reports that it often just makes her feel cold. I encouraged 20 minutes on, 20 minutes off with ice packs for her pain despite the coldness.     Device Findings  Interrogation of device reveals normal sensing and capture thresholds  See the Paceart Report for a full summary of the device information.        Patient Education  Chaya Bean was accompanied today by a family member.      [facility]'s Partnership Agreement for Device Patients and Post-operative Checklist were presented and reviewed with patient at today's appointment.    Signs and symptoms of infection, poor wound healing, and device function were reviewed. She was issued a [employer] remote Jeni and instructed on its set-up and use for remote monitoring by the [employer] representative prior to hospital  discharge. These instructions were reviewed with the patient at today s visit. Her bluetooth was disabled in her device, reenabled and discussed that her phone needs to remain on at all times, along with leaving bluetooth on, so it can connect to her pacemaker. Reviewed options of a 43304 monitor, she would like to try the wilder for a the next couple of months and will contact the clinic if she would like to switch to the [id number] remote monitor.        Plan  Remote from home in 3 months on 10/15/2019    Debbie Abdi RN BSN PHN  Device Nurse   [facility]

## 2021-05-30 NOTE — PROGRESS NOTES
Remote device check.  Please see link for full device report.  Patient was informed of results and next follow up via phone call.

## 2021-05-30 NOTE — TELEPHONE ENCOUNTER
Patient requested disability paperwork to be completed post generator change on 7/15 due to pain.  She was seen and assessed 7/22 in post op, no signs or symptoms of infection or concern.    Patient instructed to ice, alternate tylenol and ibuprofen and ok to return to work.    Patient will return to work 7/25/19.    Paperwork filled out, awaiting Dr. Chadwick's signature.    To be faxed [phone] to the [place] once completed.  TANIKA

## 2021-05-30 NOTE — PROGRESS NOTES
1955  Home:[phone] (home) Cell:[phone] (mobile)  Emergency Contact: Tyree Bean [phone]    +++Important patient information for [facility]/Cath Lab staff : None+++    [facility] EP Cath Lab Procedure Order     Device Implant/Revision:  Procedure: Generator Change Out  Device Type: Dual Pacemaker  Device Company/Device Rep Needed for Procedure: Medtronic    Diagnosis:  SSS , Device at TRIXIE  Anticipated Case Duration:  Standard  Scheduling Needs/Timeframe:  ASAP, DND would like to do 7-12-19 or have another EP do on Monday due to symptoms at TRIXIE  Anesthesia:  None  Research Protocol:  No    [facility] EP Cath Lab Prep   Ordering Provider: Dr Gillette  Ordering Date: 7/11/2019  Orders Status: Intial order placed and Order set placed    H&P:  Pt to schedule with PMD to complete  PCP: Jai Proctor MD, [phone]    Pre-op Labs: Done within 7 days    Medical Records Pertinent for Procedure:  Echo 2-28-15 EF 60%, EKG 11-19-15 paced @71 and Device Implant Record Generator change 1-23-08 w/Dr. Mccann    Patient Education:    Teach with Patient: Will be completed via phone prior to procedure, and letter was also sent to pt via mail/Raiing with written pre-procedural instructions.    Risks Reviewed:     Pacemaker Insertion    <1% for each of the following:  infection, bleeding, hematoma, pneumothorax, subclavian vein thrombosis, cardiac perforation, cardiac tamponade, arrhythmias, pectoral or diaphragmatic stimulation, air embolus, pocket erosion, device interactions.    <4% lead dislodgment, <1% lead fracture or generator  malfunction.    <0.5% CVA or MI.    <0.1% death.    If external defibrillation or CV is needed, 25% risk for superficial burn.    For patients on anticoagulation, the risk of bleeding, hematoma and tamponade are increased.      Consent: Will be obtained in [facility] day of procedure    Pre-Procedure Instructions that were Reviewed with Patient:  NPO after midnight, Remove all jewelry prior to coming in for  procedure, Shower prior to arrival, Notified patient of time and date of procedure by CV , Transportation arrangements needed s/p procedure, Post-procedure follow up process, Sedation plan/orders and Pre-procedure letter was sent to pt by CV     Pre-Procedure Medication Instructions:  Instructions given to pt regarding anticoagulants: Pt is not on an anticoagulant- N/A  Instructions given to pt regarding antiarrhythmic medication: N/A for this type of procedure; N/A  Instructions for medication, other than anticoagulants/antiarrhythmics listed above, given to pt: to take all morning medications with small sips of water, with the exception of OTC supplements and MVI      Allergies   Allergen Reactions     Amoxicillin Diarrhea       Current Outpatient Medications:      aspirin 81 MG EC tablet, Take 81 mg by mouth daily., Disp: , Rfl:      citalopram (CELEXA) 40 MG tablet, Take 60 mg by mouth daily., Disp: , Rfl:      cycloSPORINE (RESTASIS) 0.05 % ophthalmic emulsion, Administer 1 drop to both eyes 2 (two) times a day., Disp: , Rfl:      digoxin (LANOXIN) 250 mcg tablet, Take 250 mcg by mouth every morning., Disp: , Rfl:      DILANTIN 30 mg ER capsule, Take 2 capsules by mouth every morning., Disp: , Rfl: 1     gabapentin (NEURONTIN) 300 MG capsule, Take 600 mg by mouth at bedtime. , Disp: , Rfl:      hydrochlorothiazide (HYDRODIURIL) 25 MG tablet, Take 25 mg by mouth daily., Disp: , Rfl:      levETIRAcetam (KEPPRA) 250 MG tablet, Take 500 mg by mouth 2 (two) times a day.    , Disp: , Rfl:      levothyroxine (SYNTHROID, LEVOTHROID) 125 MCG tablet, Take 125 mcg by mouth Daily at 6:00 am. , Disp: , Rfl:      peg 400-propylene glycol PF (SYSTANE, PF,) 0.4-0.3 % Dpet, Apply 1 drop to eye 3 (three) times a day as needed., Disp: , Rfl:      phenytoin (DILANTIN) 100 MG ER capsule, Take 100 mg by mouth every morning. Along with 2 - 30 mg capsules, Disp: , Rfl:      phenytoin (DILANTIN) 100 MG ER capsule,  Take 200 mg by mouth bedtime., Disp: , Rfl:      potassium chloride (MICRO-K) 10 mEq CR capsule, Take 10 mEq by mouth 2 (two) times a day.    , Disp: , Rfl:     Documentation Date:7/11/2019 2:55 PM  Carie Reilly RN

## 2021-05-30 NOTE — PROGRESS NOTES
Pre-Intra-Post education and instructions as listed below were reviewed with Patient via phone  7/12/2019 2:08 PM  Ananya Bates RN

## 2021-05-30 NOTE — PROGRESS NOTES
Heart Care Device Change-Out Checklist (TRIXIE Checklist)    Device Data  Pacemaker and Dual    :  Medtronic  Model:  [id number] Adapta  Serial Number:  [id number]    Implant Date: 1/23/2008; Implanted by Dr. Truong Mccann  TRIXIE Date:  7/8/2019  Device Diagnosis:  Sick Sinus Syndrome; tachy-marlys syndrome    Device Alert(s):  No    Lead Data  (Print Device History and attach to this document. Enter each lead  and model number.)  Last Interrogation Date: 6/11/2019      Atrium: Medtronic 5524M CapSure SP   Lead Imp:  652Ohms, Pacing Threshold:  0.75V @ 0.4 ms and Sensing Threshold:  Paced      Right Ventricle: Medtronic 5024M CapSure SP   Lead Imp:  526Ohms, Pacing Threshold:  1.25V @ 0.76 ms and Sensing Threshold:  15.67 mV       Old Leads Present/Abandoned: No    Lead Alert(s):  No    Diagnostic Information  Intrinsic Rhythm:  No p's/R's at VVI-30bpm; intact AP-VS    Atrial Fibrillation:  Paroxysmal Atrial-Fib- very minimal burden <0.1%, short durations  Takes Anticoagulant? No    Pacing Percentages  Atrial Pacing 99% and Ventricle Pacing 3%  Pacemaker Dependent? Yes     Ejection Fraction  Last EF Date:  3/1/2015    By Echocardiogram  Last EF Measurement:  60%      Heart Failure Diagnostics:  N/A    Special Instructions/Timeframe for change-out:  Device in TRIXIE mode at VVI@65 bpm, Not programmable and patient is symptomatic, please change out ASAP    Routed to EP:  Yes: Dr. Douglas Gillette      Device RN: Marisela Lua RN

## 2021-05-30 NOTE — TELEPHONE ENCOUNTER
"  Type: routine remote pacemaker transmission.  Presenting rhythm: marker channel only, ventricular pacing rate 65 bpm.  Battery/lead status: lead measurements stable. Battery reached replacement time on 7/8/19.  Arrhythmias: since 6/11/19, data collection terminated by RRT.  Comments: device is at TRIXIE, has reprogrammed to VVI 65. Routed to device RN. prd     Transmission reviewed, agree with above. Pacemaker remote shows device is now at TRIXIE. Pacing mode for this device changes to VVI@ 65 and cannot be reprogrammed. Call placed to patient to review findings. She states she has not been feeling well for the last couple days. Short of breath with activity and intermittently lightheaded. For now she feels \"ok\" but discussed what to do if s/s worsen. Reviewed with patient to when seek ER and discussed to go to [facility] as this is where device will be changed out. Verbalized understanding.       For now she denies respiratory distress, chest pain, and near-syncope/syncope. Will forward device information and TRIXIE checklist to Dr. Gillette for prompt change out.     Marisela Lua RN        "

## 2021-05-30 NOTE — TELEPHONE ENCOUNTER
Patient called device clinic with question about when staples will be removed as they are uncomfortable. Discussed that these will be removed at her post-op visit next Monday. States she has tried Tylenol and that has not helped at all. Recommended trying a couple doses of Ibuprofen and cool packs for discomfort. She agrees. Advised to call if worsens before Monday.   Marisela Lua RN

## 2021-05-31 ENCOUNTER — RECORDS - HEALTHEAST (OUTPATIENT)
Dept: ADMINISTRATIVE | Facility: CLINIC | Age: 66
End: 2021-05-31

## 2021-05-31 NOTE — TELEPHONE ENCOUNTER
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

## 2021-05-31 NOTE — TELEPHONE ENCOUNTER
Return call from [facility] with Health Services.  She explains that both sets of paperwork are necessary in order for the patient to receive FMLA.  [facility] and [place] paperwork both filled out and faxed.  Scanned into media.  Phone call to patient to let her know that this has been completed, no answer and detailed message left.

## 2021-05-31 NOTE — TELEPHONE ENCOUNTER
Detailed message left for patient explaining that her paperwork was completed and faxed yesterday.  Reviewed contact information for further questions or concerns.

## 2021-05-31 NOTE — TELEPHONE ENCOUNTER
----- Message from Maral Calvo sent at 7/31/2019  1:16 PM CDT -----  Contact: Patient  Caller: Chaya    Primary cardiologist: Dr. Chadwick    Detailed reason for call: Chaya states she's waiting to hear if her FMLA was completed and faxed? Please call back.     Best phone number: [phone]  Best time to contact: Any  Ok to leave a detailed message? Yes  Device? Yes    Additional Info: Paperwork to be faxed to [phone] ([place])

## 2021-06-01 VITALS — BODY MASS INDEX: 31.34 KG/M2 | WEIGHT: 195 LBS | HEIGHT: 66 IN

## 2021-06-03 VITALS — WEIGHT: 174.7 LBS | HEIGHT: 66 IN | BODY MASS INDEX: 28.08 KG/M2

## 2021-06-03 VITALS — HEIGHT: 66 IN | BODY MASS INDEX: 28.77 KG/M2 | WEIGHT: 179 LBS

## 2021-06-03 VITALS — BODY MASS INDEX: 28.57 KG/M2 | WEIGHT: 177.8 LBS | HEIGHT: 66 IN

## 2021-06-03 VITALS — BODY MASS INDEX: 28.61 KG/M2 | HEIGHT: 66 IN | WEIGHT: 178 LBS

## 2021-06-03 NOTE — PROGRESS NOTES
"[facility] Daily Progress     Patient Name: Chaya Bean  Date: 11/26/2019  Visit #: 3  PTA visit #:  1  PRECAUTIONS: PACEMAKER, h/o lumbar fusion in 1998, previous cervical surgery  Referral Diagnosis:   R29.898 (ICD-10-CM) - Weakness of both lower extremities   M54.14 (ICD-10-CM) - Radicular pain of thoracic region   M51.24 (ICD-10-CM) - Thoracic disc herniation   Z98.1 (ICD-10-CM) - History of lumbar fusion   R26.89 (ICD-10-CM) - Balance problem      Referring provider: Gillian Seay PA-C  Visit Diagnosis:     ICD-10-CM    1. Generalized muscle weakness M62.81    2. Unsteadiness on feet R26.81    3. Antalgic gait R26.89    4. Chronic midline thoracic back pain M54.6     G89.29          Assessment:     Based on falls assessment, patient is at an increased risk for falling. Plan of care will address this risk with lower extremity strengthening and balance training.  Pt also with chronic midline thoracic back pain with L-sided radiating pain. PER EMR, \" CT cervical spine showed a left paracentral disc herniation at T10-11 with ventral cord abutment and moderate left foraminal stenosis which correlates with her pain.  There is also moderate to severe left foraminal stenosis T11-T12 related to a disc protrusion.\"    HEP/POC compliance is  good .  Patient is appropriate to continue with skilled physical therapy intervention, as indicated by initial plan of care.    Goal Status: Progressing towards  Pt. will be independent with home exercise program in : 6 weeks  Pt. will be able to walk : 30 minutes;for exercise/recreation;for community mobility;Comment  Comment:: with LRAD in 6 weeks with improved gait pattern and without increased pain.  Patient will twist/turn : in bed;with no pain;for bed mobilitiy;in 6 weeks  Patient will sit: 60 minutes;for work;Comment  Comment: without increased pain in 6 weeks.    Patient will increase : LEFS score;for improved quality of life;in 6 weeks;by _ points  by ___ " points: >= 9  Pt will: demonstrate improved gait speed: 4m walk test <= 4.0 sec for decreased risk of falls in 6 weeks.  Pt will: demonstrate improved LE strength: APTA >= 9x for decreased risk of falls in 6 weeks.  Pt will: demonstrate improved functional mobility : TUG <= 13 sec for decreased risk of falls in 6 weeks.      Plan / Patient Education:     Continue per POC, progressing spinal stability and LE strength, static and dynamic standing balance ex as tolerated.  Next visit: Progress supine core ex as able.  May trial , hip ABD, knee curls, ankle PF/DF.    Subjective:     Pain Ratin/10,   Denies falls.  Doing HEP regularly.    Objective:     Limited lift off (approx. 10%) with bridges.  Quad lag noted bilat with SLR.  Walking handout discussed and issued.    Treatment Today     TREATMENT MINUTES COMMENTS   Evaluation     Self-care/ Home management     Manual therapy     Neuromuscular Re-education 8 Balance ex per flow sheet   Therapeutic Activity     Therapeutic Exercises 20 Ex per flow sheet   Gait training     Modality__________________                Total 28    Blank areas are intentional and mean the treatment did not include these items.       Pam Gallagher  2019

## 2021-06-03 NOTE — TELEPHONE ENCOUNTER
Call to pt with provider's results and recommendations. Left message with man who answered the phone and stated the patient was not there. The patient will call back when available.

## 2021-06-03 NOTE — PROGRESS NOTES
"[facility]   Initial Evaluation    Patient Name: Chaya Bean  Date of evaluation: 11/19/2019  PRECAUTIONS: PACEMAKER, h/o lumbar fusion in 1998, previous cervical surgery  Referral Diagnosis:   R29.898 (ICD-10-CM) - Weakness of both lower extremities   M54.14 (ICD-10-CM) - Radicular pain of thoracic region   M51.24 (ICD-10-CM) - Thoracic disc herniation   Z98.1 (ICD-10-CM) - History of lumbar fusion   R26.89 (ICD-10-CM) - Balance problem     Referring provider: Gillian Seay PA-C  Visit Diagnosis:     ICD-10-CM    1. Generalized muscle weakness M62.81    2. Unsteadiness on feet R26.81    3. Antalgic gait R26.89    4. Chronic midline thoracic back pain M54.6     G89.29        Assessment:      Pt. is appropriate for skilled PT intervention as outlined in the Plan of Care (POC).  Pt. is a good candidate for skilled PT services to improve pain levels and function.  Based on falls assessment, patient is at an increased risk for falling. Plan of care will address this risk with lower extremity strengthening and balance training.  Pt also with chronic midline thoracic back pain with L-sided radiating pain. PER EMR, \" CT cervical spine showed a left paracentral disc herniation at T10-11 with ventral cord abutment and moderate left foraminal stenosis which correlates with her pain.  There is also moderate to severe left foraminal stenosis T11-T12 related to a disc protrusion.\"  Goals and POC established in collaboration with the patient.    Goals:  Pt. will be independent with home exercise program in : 6 weeks  Pt. will be able to walk : 30 minutes;for exercise/recreation;for community mobility;Comment  Comment:: with LRAD in 6 weeks with improved gait pattern and without increased pain.  Patient will twist/turn : in bed;with no pain;for bed mobilitiy;in 6 weeks  Patient will sit: 60 minutes;for work;Comment  Comment: without increased pain in 6 weeks.    Patient will increase : LEFS score;for improved " "quality of life;in 6 weeks;by _ points  by ___ points: >= 9  Pt will: demonstrate improved gait speed: 4m walk test <= 4.0 sec for decreased risk of falls in 6 weeks.  Pt will: demonstrate improved LE strength: APTA >= 9x for decreased risk of falls in 6 weeks.  Pt will: demonstrate improved functional mobility : TUG <= 13 sec for decreased risk of falls in 6 weeks.      Patient's expectations/goals are moderately realistic    Barriers to Learning or Achieving Goals:  Chronicity of the problem.  Co-morbidities or other medical factors.  h/o multiple spinal surgeries       Plan / Patient Instructions:        Plan of Care:   Communication with: Referral Source  Patient Related Instruction: Nature of Condition;Treatment plan and rationale;Self Care instruction;Basis of treatment;Body mechanics;Posture;Precautions;Next steps;Expected outcome  Times per Week: 2  Number of Weeks: 6  Number of Visits: 12  Therapeutic Exercise: ROM;Stretching;Strengthening  Neuromuscular Reeducation: posture;balance/proprioception;core  Manual Therapy: soft tissue mobilization;myofascial release;joint mobilization;muscle energy (PRN)      Plan for next visit: Review HEP. Plan to progress static/dynamic standing balance ex as tolerated, incorporating spinal stability exercises to help reduce pain and improve function.     Subjective:       History of Present Illness:    Chaya is a 64 y.o. female who presents to therapy today with complaints of primary complaints of generalized weakness and unsteadiness on feet, with secondary concerns of thoracic pain with L-sided radiating pain.   [facility]: \"ASSESSMENT: Chaya Bean is a 64 y.o. female with past medical history significant for obstructive sleep apnea, hypercholesterolemia, hypertension, pacemaker in place, history of GI bleed, hypothyroidism who presents today for new patient evaluation of chronic left mid back pain which radiates around the left flank and left " "abdomen which became severe 6 weeks ago.  CT cervical spine showed a left paracentral disc herniation at T10-11 with ventral cord abutment and moderate left foraminal stenosis which correlates with her pain.  There is also moderate to severe left foraminal stenosis T11-T12 related to a disc protrusion.  -Patient also has chronic weakness in her legs with balance problems which has been present since she had a multilevel lumbar fusion in .  She feels this is getting worse.  I would like to evaluate for possible lumbar spinal stenosis.  She does not have any radicular pain in the lower extremities.\"    Notes several falls over the last year. Most recent being about 3-4 weeks ago when she went to get up from her desk and tripped over her feet, stumbling into the wall.  Notes occasionally will trip up the stairs.  Does have a SEC that she uses primarily for community mobility; no AD used mostly for household mobility.  Lives in second floor walk-up apartment. There is a handrail.    Does also note some mid-back pain that wraps around her L side to the front of the chest. This was really bad in September (she is unsure why), but has calmed down since then.  Worse with operating machines, bending, prolonged sitting (45 min max), rolling over in bed. Reports waking 2-3x/night due to pain.    Pt seeks PT to \"strengthen the back and legs.\"  \"To be able to walk longer distances (15 min max currently), stand longer, sit with better posture, have pain better under control, be able to lift and carry grocery bags (currently gets assist from another person).    Pain Ratin  Pain rating at best: 3  Pain rating at worst: 8  Pain description: aching, burning, dull, pain, sharp, soreness and weakness     Objective:      Patient Outcome Measures :    Lower Extremity Functional Scale (_/80): 36     Scores range from 0-80, where a score of 80 represents maximum function. The minimal clinically important difference is a positive " change of 9 points.    Examination  1. Generalized muscle weakness     2. Unsteadiness on feet     3. Antalgic gait     4. Chronic midline thoracic back pain       Involved side: Bilateral  Posture Observation:      General sitting posture is  poor.  General standing posture is poor.  Cervical:  Moderate forward head  Shoulder/Thoracic complex: Moderate bilateral scapular protraction   Moderately increased upper thoracic kyphosis     Gait is moderately antalgic, both with and without use of SEC.    4m walk test: 6.1 sec without AD  TU.7 sec without AD  APTA: 5x without UE A    Cervical ROM. Reports pain with all motions, worst into L/R ROT.  Date:      *Indicate scale AROM AROM AROM   Cervical Flexion 30     Cervical Extension 10      Right Left Right Left Right Left   Cervical Sidebending 20 20       Cervical Rotation 40 40       Cervical Protraction      Cervical Retraction      Thoracic Flexion      Thoracic Extension      Thoracic Sidebending         Thoracic Rotation 25% 25%         Lumbar ROM: All % of WNL. Reports pain with all motions.  Date:      *Indicate scale AROM AROM AROM   Lumbar Flexion 50     Lumbar Extension 25      Right Left Right Left Right Left   Lumbar Sidebending 25 25       Lumbar Rotation 25 25       Thoracic Flexion      Thoracic Extension      Thoracic Sidebending         Thoracic Rotation           Lower Extremity Strength:  Date:      LE strength/5 Right Left Right Left Right Left   Hip Flexion (L1-3) 3+ 3+       Hip Extension (L5-S1)         Hip Abduction (L4-5)         Hip Adduction (L2-3)         Hip External Rotation         Hip Internal Rotation         Knee Extension (L3-4) 3+ 3+       Knee Flexion 3+ 3+       Ankle Dorsiflexion (L4-5) 4- 4-       Great Toe Extension (L5)         Ankle Plantar flexion (S1)         Abdominals          Treatment Today     TREATMENT MINUTES COMMENTS   Evaluation 30 Spine/Gait and Balance  Discussed therapy diagnosis, prognosis, POC, relevant  anatomy and basis for tx.   Self-care/ Home management     Manual therapy     Neuromuscular Re-education     Therapeutic Activity     Therapeutic Exercises 24 Ex per flow sheet  Exercises:  Exercise #1: Supine PPT, bridges, SLR, clamshells - H  Comment #1: 10x B each  Exercise #2: Scap retractions - H  Comment #2: 10x  Exercise #3: FT/EC on firm - H  Comment #3: 2x30 sec  Exercise #4: Standing marches, hip ABD, knee flexion, ankle PF/DF - add as tolerated over time     Gait training     Modality__________________                Total 54    Blank areas are intentional and mean the treatment did not include these items.            PT Evaluation Code: (Please list factors)  Patient History/Comorbidities: chronicity, previous spinal surgeries  Examination: Thoracic pain, muscle weakness, decreased ROM, unsteady gait and balance  Clinical Presentation: Evolving  Clinical Decision Making: Moderate    Patient History/  Comorbidities Examination  (body structures and functions, activity limitations, and/or participation restrictions) Clinical Presentation Clinical Decision Making (Complexity)   No documented Comorbidities or personal factors 1-2 Elements Stable and/or uncomplicated Low   1-2 documented comorbidities or personal factor 3 Elements Evolving clinical presentation with changing characteristics Moderate   3-4 documented comorbidities or personal factors 4 or more Unstable and unpredictable High           Horacio Baker  11/19/2019  10:48 AM

## 2021-06-03 NOTE — TELEPHONE ENCOUNTER
----- Message from Gillian Seay PA-C sent at 11/18/2019  8:06 AM CST -----  Regarding: CT lumbar  Please call patient and let her know that I reviewed her CT lumbar spine.  This does not show any significant narrowing around the nerves to cause the weakness in her legs.  She should begin physical therapy as scheduled.  We can review at her follow-up visit next month.

## 2021-06-03 NOTE — PROGRESS NOTES
ASSESSMENT: Chaya Bean is a 64 y.o. female with past medical history significant for obstructive sleep apnea, hypercholesterolemia, hypertension, pacemaker in place, history of GI bleed, hypothyroidism who presents today for new patient evaluation of chronic left mid back pain which radiates around the left flank and left abdomen which became severe 6 weeks ago.  CT cervical spine showed a left paracentral disc herniation at T10-11 with ventral cord abutment and moderate left foraminal stenosis which correlates with her pain.  There is also moderate to severe left foraminal stenosis T11-T12 related to a disc protrusion.  -Patient also has chronic weakness in her legs with balance problems which has been present since she had a multilevel lumbar fusion in 1998.  She feels this is getting worse.  I would like to evaluate for possible lumbar spinal stenosis.  She does not have any radicular pain in the lower extremities.    BHANU:34  Who 5: Patient declined    PLAN:  A shared decision making model was used.  The patient's values and choices were respected.  The following represents what was discussed and decided upon by the physician assistant and the patient.      1.  DIAGNOSTIC TESTS: I reviewed the CT thoracic spine.  Patient cannot have an MRI because of her pacemaker.    -I ordered a CT lumbar spine for further evaluation of her leg weakness status post fusion.    2.  PHYSICAL THERAPY:  Discussed the importance of core strengthening, ROM, stretching exercises with the patient and how each of these entities is important in decreasing pain.  Explained to the patient that the purpose of physical therapy is to teach the patient a home exercise program.  These exercises need to be performed every day in order to decrease pain and prevent future occurrences of pain.  I entered a referral for the patient begin physical [facility] [place] [facility]    3.  MEDICATIONS:    -Gabapentin 300 mg was  "prescribed.  She will take 300 mill grams at bedtime x3 days then 60 mg at bedtime.  Patient had previously taken this medication and it was very helpful for burning in her feet.  She does not recall why it was discontinued.  She did not tolerated during the day but it helped significantly at night.  - Patient could also use Tylenol as needed.    4.  INTERVENTIONS: No interventions were ordered.  Patient will trial physical therapy first.  If she fails to improve, will recommend a T12-L1 interlaminar epidural steroid injection.    5.  PATIENT EDUCATION: Patient is in agreement the above plan.  All questions were answered.    6.  FOLLOW-UP:   A nurse will call the patient with the results of her CT lumbar spine.  I will plan to see the patient back in the clinic in 4 weeks to monitor her progress with physical therapy and gabapentin.  If she has any questions or concerns in the meantime, she should not hesitate to call.      SUBJECTIVE:  Chaya Bean  Is a 64 y.o. female who presents today in consultation at the request of Dr. Proctor for new patient evaluation of chronic mid back pain with radiation around the left flank into the abdomen which became severe 6 weeks ago.  Patient states that she has had back problems ever since she was born.  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  The patient had a multilevel lumbar fusion in 1998.  She had the hardware removed in 2000 after she had fractured screws after falling onto broken glass.  She also had neck surgery sometime after that because her left arm was going numb.  She states that she continued to have pain throughout her spine after her surgery.  She also continues to have weakness in her legs after her surgeries.  Her mid back pain became much more severe 6 weeks ago.  She denies any specific injury or event at that time to cause the pain.  For the first 3 weeks she was \"miserable.\"  Her pain has eased up some since then, but she continues " have significant pain.    Patient complains of left-sided mid back pain.  Pain begins in the left lower thoracic region.  Pain wraps around the left flank into the left abdomen.  She denies any pain down the legs, but she states that he has stiffness in both hips.  Her pain is aggravated with prolonged sitting, prolonged standing, and walking.  Pain is alleviated with lying down.  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  Patient states that she has a burning and tingling sensation in the lower legs and feet which is chronic.  Again, she has felt weakness in both legs ever since her first back surgery.  She states that before surgery she was able to walk 2 miles a day and jump rope.  Ever since surgery she has not been able to do that.  She has walked with a cane ever since surgery.  She feels that the weakness is getting gradually worse.  She states that for the past 2 years she has had significant difficulty with stairs.  She denies any loss of bowel or bladder control.  Denies any recent fevers, chills, sweats.    Patient has not had any physical therapy for her back for at least 20 years.  She tried one epidural steroid injection before her back surgery in 1998 which was not helpful and very painful.  She goes to the chiropractor occasionally but has not been since her pain became severe 6 weeks ago.  She is not taking any medications for pain.  She states that she used to take gabapentin and it was helpful.  She does not know why it was stopped.    Current Outpatient Medications on File Prior to Visit   Medication Sig Dispense Refill     aspirin 81 MG EC tablet Take 81 mg by mouth daily.       citalopram (CELEXA) 40 MG tablet Take 60 mg by mouth daily.       cycloSPORINE (RESTASIS) 0.05 % ophthalmic emulsion Administer 1 drop to both eyes daily as needed.              digoxin (LANOXIN) 250 mcg tablet Take 250 mcg by mouth every morning.       hydrochlorothiazide  (HYDRODIURIL) 25 MG tablet Take 25 mg by mouth daily.       levETIRAcetam (KEPPRA) 250 MG tablet Take 500 mg by mouth 2 (two) times a day.              levothyroxine (SYNTHROID, LEVOTHROID) 125 MCG tablet Take 125 mcg by mouth Daily at 6:00 am.        phenytoin (DILANTIN) 100 MG ER capsule Take 100 mg by mouth every morning.              phenytoin (DILANTIN) 100 MG ER capsule Take 200 mg by mouth bedtime.       potassium chloride (MICRO-K) 10 mEq CR capsule Take 10 mEq by mouth 2 (two) times a day.                Allergies   Allergen Reactions     Amoxicillin Diarrhea     Adhesive Rash       Past Medical History:   Diagnosis Date     Dyslipidemia      Hypertension      Hypothyroid      Pacemaker      Post herpetic neuralgia      Sick sinus syndrome (H)      Sleep apnea      Syncope      Third degree AV block (H)         Past Surgical History:   Procedure Laterality Date     ABDOMINAL SURGERY       BACK SURGERY       CARDIAC PACEMAKER PLACEMENT       OOPHORECTOMY Left 12 yrs ago       Family History   Problem Relation Age of Onset     Diabetes Mother      Heart disease Mother      Hypertension Mother      Stroke Mother      BRCA 1/2 Neg Hx      Breast cancer Neg Hx      Cancer Neg Hx      Colon cancer Neg Hx      Endometrial cancer Neg Hx      Ovarian cancer Neg Hx          Social history: Patient is .  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  She drinks alcohol rarely.  She denies tobacco use.  She denies illicit drug use.      ROS: Positive for constipation, muscle pain, muscle fatigue, excessive tiredness.  Specifically negative for bowel/bladder dysfunction, fevers,chills, appetite changes, unexplained weight loss.   Otherwise 13 systems reviewed are negative.  Please see the patient's intake questionnaire from today for details.      OBJECTIVE:  PHYSICAL EXAMINATION:    CONSTITUTIONAL:  Vital signs as above.  No acute distress.  The patient is well nourished and well  groomed.  PSYCHIATRIC:  The patient is awake, alert, oriented to person, place, time and answering questions appropriately with clear speech.    HEENT: Normocephalic, atraumatic.  Sclera clear.  Neck is supple.  SKIN:  Skin over the face, bilateral lower extremities, and posterior torso is clean, dry, intact without rashes.    GAIT:  Gait is unsteady.  She has difficulty with tandem gait.  The patient is able to heel and toe walk bilaterally but appears unsteady.  STANDING EXAMINATION: Tender to palpation left lower thoracic paraspinous muscles.  Tender to palpation bilateral lower lumbar paraspinous muscles.  Lumbar flexion significantly restricted.  Lumbar extension significantly restricted.  MUSCLE STRENGTH:  The patient has tremulous weakness which I would grade as 4/5 for the bilateral hip flexors, knee flexors/extensors, ankle dorsiflexors/plantar flexors, great toe extensors, ankle evertors/invertors.  NEUROLOGICAL: 2+ patellar, and achilles reflexes bilaterally.  Negative Babinski's bilaterally.  No ankle clonus bilaterally. Sensation to light touch is intact in the bilateral L4, L5, and S1 dermatomes.  VASCULAR:  2/4 dorsalis pedis pulses bilaterally.  Bilateral lower extremities are warm.  There is no pitting edema of the bilateral lower extremities.  ABDOMINAL:  Soft, non-distended, non-tender throughout all quadrants.  No pulsatile mass palpated in the left lower quadrant.  LYMPH NODES:  No palpable or tender inguinal lymph nodes.  MUSCULOSKELETAL: Straight leg raise negative bilaterally.    RESULTS: I reviewed the CT thoracic spine from [facility] dated the 27th 2019.  This shows diffuse thoracic and upper lumbar disc degeneration with mild to moderate thoracal lumbar scoliosis.  There is a 4 to 5 mm broad-based left paracentral disc herniation at T10-11 with ventral cord abutment and moderate left foraminal stenosis.  At T11-T12 there is moderate to severe left foraminal stenosis related to a left lateral  disc protrusion.  There is a suspected 2 to 3 mm left posterolateral disc protrusion T8-9.  There is moderate foraminal stenosis on the right at T12-L1.  Please see report for further details.

## 2021-06-03 NOTE — PROGRESS NOTES
"[facility] Daily Progress     Patient Name: Chaya Bean  Date: 11/21/2019  Visit #: 2  PTA visit #:    PRECAUTIONS: PACEMAKER, h/o lumbar fusion in 1998, previous cervical surgery  Referral Diagnosis:   R29.898 (ICD-10-CM) - Weakness of both lower extremities   M54.14 (ICD-10-CM) - Radicular pain of thoracic region   M51.24 (ICD-10-CM) - Thoracic disc herniation   Z98.1 (ICD-10-CM) - History of lumbar fusion   R26.89 (ICD-10-CM) - Balance problem      Referring provider: Gillian Seay PA-C  Visit Diagnosis:     ICD-10-CM    1. Generalized muscle weakness M62.81    2. Unsteadiness on feet R26.81    3. Antalgic gait R26.89    4. Chronic midline thoracic back pain M54.6     G89.29          Assessment:     Based on falls assessment, patient is at an increased risk for falling. Plan of care will address this risk with lower extremity strengthening and balance training.  Pt also with chronic midline thoracic back pain with L-sided radiating pain. PER EMR, \" CT cervical spine showed a left paracentral disc herniation at T10-11 with ventral cord abutment and moderate left foraminal stenosis which correlates with her pain.  There is also moderate to severe left foraminal stenosis T11-T12 related to a disc protrusion.\"    HEP/POC compliance is  good .  Patient is appropriate to continue with skilled physical therapy intervention, as indicated by initial plan of care.    Goal Status:  Pt. will be independent with home exercise program in : 6 weeks  Pt. will be able to walk : 30 minutes;for exercise/recreation;for community mobility;Comment  Comment:: with LRAD in 6 weeks with improved gait pattern and without increased pain.  Patient will twist/turn : in bed;with no pain;for bed mobilitiy;in 6 weeks  Patient will sit: 60 minutes;for work;Comment  Comment: without increased pain in 6 weeks.    Patient will increase : LEFS score;for improved quality of life;in 6 weeks;by _ points  by ___ points: >= 9  Pt will: " demonstrate improved gait speed: 4m walk test <= 4.0 sec for decreased risk of falls in 6 weeks.  Pt will: demonstrate improved LE strength: APTA >= 9x for decreased risk of falls in 6 weeks.  Pt will: demonstrate improved functional mobility : TUG <= 13 sec for decreased risk of falls in 6 weeks.      Plan / Patient Education:     Continue per POC, progressing spinal stability and LE strength, static and dynamic standing balance ex as tolerated.  Next visit: Progress supine core ex as able.  May trial standing marches, hip ABD, knee curls, ankle PF/DF.    Subjective:     Pain Ratin/10, but 7/10 earlier this AM, mid back and radiating around the L side.  Doing HEP regularly.    Objective:     Limited lift off (approx. 10%) with bridges.  Quad lag noted bilat with SLR.  Walking handout discussed and issued.    Treatment Today     TREATMENT MINUTES COMMENTS   Evaluation     Self-care/ Home management     Manual therapy     Neuromuscular Re-education 8 Balance ex per flow sheet   Therapeutic Activity     Therapeutic Exercises 20 Ex per flow sheet   Gait training     Modality__________________                Total 28    Blank areas are intentional and mean the treatment did not include these items.       Horacio Baker  2019

## 2021-06-04 VITALS — HEIGHT: 66 IN | WEIGHT: 174 LBS | BODY MASS INDEX: 27.97 KG/M2

## 2021-06-04 VITALS — WEIGHT: 174 LBS | BODY MASS INDEX: 28.51 KG/M2

## 2021-06-04 NOTE — PROGRESS NOTES
Assessment:   Chaya Bean is a 64 y.o. y.o. female with past medical history significant for obstructive sleep apnea, hypercholesterolemia, hypertension, pacemaker in place, history of GI bleed, hypothyroidism who presents today for follow-up regarding chronic left mid back pain which radiates around the left flank and left abdomen.  CT thoracic spine showed a left paracentral disc herniations at T10-11 with ventral cord abutment and moderate left foraminal stenosis which correlates with her pain.  There is also moderate to severe foraminal stenosis T11-T12 related to this protrusion.  Patient reports 25% improvement in this pain over the past 4 weeks with physical therapy and gabapentin.  -Patient also has chronic bilateral low back pain and weakness in the legs with balance problems and numbness and tingling in the feet which has been present since she had a multilevel lumbar fusion in 1998.  CT lumbar spine showed solid interbody fusion L2-3 through L5-S1.  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  There is advanced disc degeneration at L1-L2 and T12-L1.  There is no high-grade spinal canal or neuroforaminal stenosis.  She may have peripheral neuropathy.       Plan:     A shared decision making plan was used.  The patient's values and choices were respected.  The following represents what was discussed and decided upon by the physician assistant and the patient.      1.  DIAGNOSTIC TESTS: I reviewed the CT scans of the thoracic and lumbar spine.  I offered to obtain an EMG bilateral lower extremities for further evaluation of her numbness and weakness.  She declined.    2.  PHYSICAL THERAPY: Patient is currently in physical therapy.  She has had 3 sessions of far.  Encouraged to continue with physical therapy and the home exercises.    3.  MEDICATIONS:    -Refilled the patient's gabapentin.  She ended up taking 900 mg at bedtime.  She found this to be very helpful for her back pain and  lower extremity numbness and tingling.  She did not have any significant side effects.  Patient has tried taking this medication in the day previously and did not tolerated due to drowsiness.  -Patient could also use Tylenol as needed.    4.  INTERVENTIONS: No interventions were ordered.  Patient will continue with physical therapy for now.  If she fails to improve, recommend a T12-L1 interlaminar epidural steroid injection.      5.  PATIENT EDUCATION:  Patient is in agreement the above plan.  All questions were answered.    6.  FOLLOW-UP: I will see the patient back in the clinic in 4-6 weeks to monitor her progress with physical therapy.  If she has questions or concerns in the meantime, she should not hesitate to call.    Subjective:     Chaya Bean is a 64 y.o. female who presents today for follow-up regarding chronic mid back pain with radiation around the left flank into the abdomen.  I saw the patient in consultation on November 8.  At that time I referred the patient to physical therapy.  She has had 3 sessions of far.  She reports 25% improvement in her pain.    Patient complains of left-sided mid back pain.  Pain radiates toward the left flank.  Pain is no longer radiating around the left abdomen.  She states that this pain is not as severe as it once was.  Patient also complains of bilateral low back pain that spans across low back and a broadband distribution at the belt line.  She denies any pain radiating the buttocks or down the legs.  She has chronic numbness and tingling in both lower legs and feet which has been present since her lumbar fusion surgery 1998.  She states that her legs feel weak.  She states that she had to crouch down the other day to get something off of the floor and had difficulty standing back up.  She rates her pain today as a 4 or 5 out of 10.  At its worst it is an 8 of 10.  At its best it is a 2 out of 10.  Pain is aggravated with standing, bending, walking, prolonged  sitting.  Pain is alleviated with rest.  She denies any new symptoms and she was last seen.      As mentioned above, patient is currently in physical therapy and has had 3 sessions of far.  She does her home exercises.  She is using gabapentin 900 mg at bedtime.  She ran out a couple of days ago.  She states that her pain was worse on the nights that she could not take the medication.  She did not tolerate taking this medication during the day due to drowsiness.    Past medical history is reviewed and is unchanged.    Review of Systems:  Positive for numbness/tingling, weakness, trips.  Negative for loss of bowel/bladder control, inability to urinate, headache, pain much worse at night, difficulty swallowing, difficulty with hand skills, fevers, unintentional weight loss.     Objective:   CONSTITUTIONAL:  Vital signs as above.  No acute distress.  The patient is well nourished and well groomed.    PSYCHIATRIC:  The patient is awake, alert, oriented to person, place and time.  The patient is answering questions appropriately with clear speech.  Normal affect.  HEENT: Normocephalic, atraumatic.  Sclera clear.    SKIN:  Skin over the face, posterior torso, bilateral upper and lower extremities is clean, dry, intact without rashes.  MUSCULOSKELETAL: Tender palpation left mid and lower thoracic paraspinous muscles.  Tender to palpation bilateral lower lumbar paraspinous muscles.  The patient has tremulous weakness which I would grade as 4/5 for the bilateral hip flexors, knee flexors/extensors, ankle dorsiflexors/plantar flexors, great toe extensors, ankle evertors/invertors.   NEUROLOGICAL: 2+ patellar, achilles reflexes which are symmetric bilaterally.  No ankle clonus bilaterally.  Sensation to light touch is intact in the bilateral L4, L5, and S1 dermatomes.       RESULTS:    I reviewed the CT thoracic spine from [facility] dated the 27th 2019.  This shows diffuse thoracic and upper lumbar disc degeneration with mild to  moderate thoracal lumbar scoliosis.  There is a 4 to 5 mm broad-based left paracentral disc herniation at T10-11 with ventral cord abutment and moderate left foraminal stenosis.  At T11-T12 there is moderate to severe left foraminal stenosis related to a left lateral disc protrusion.  There is a suspected 2 to 3 mm left posterolateral disc protrusion T8-9.  There is moderate foraminal stenosis on the right at T12-L1.  Please see report for further details.    I reviewed the CT lumbar spine from [facility] dated November 15, 2019.  This shows a hemivertebral segment on the right between L4 and L5 causing pronounced dextrocurvature.  There is solid interbody fusion L2-3 through L5-S1 with no significant central canal or neuroforaminal stenosis.  There is advanced disc degeneration L1-L2 and moderate disc degeneration T12-L1.  Please see report for further details.

## 2021-06-04 NOTE — PROGRESS NOTES
[facility] Daily Progress     Patient Name: Chaya Bean  Date: 12/26/2019  Visit #: 6  PTA visit #:  1  PRECAUTIONS: PACEMAKER, h/o lumbar fusion in 1998, previous cervical surgery  Referral Diagnosis:   R29.898 (ICD-10-CM) - Weakness of both lower extremities   M54.14 (ICD-10-CM) - Radicular pain of thoracic region   M51.24 (ICD-10-CM) - Thoracic disc herniation   Z98.1 (ICD-10-CM) - History of lumbar fusion   R26.89 (ICD-10-CM) - Balance problem      Referring provider: Gillian Seay PA-C  Visit Diagnosis:     ICD-10-CM    1. Generalized muscle weakness M62.81    2. Unsteadiness on feet R26.81    3. Antalgic gait R26.89    4. Chronic midline thoracic back pain M54.6     G89.29          Assessment:     HEP/POC compliance is  good .  Patient is benefitting from skilled physical therapy and is making steady progress toward functional goals.  Patient is appropriate to continue with skilled physical therapy intervention, as indicated by initial plan of care.     Overall, pain is reporting and demonstrating significantly improved pain, strength, and balance from start of care. Her reassessment on 12/26 reveals significantly decreased risk of future falls compared to start of care. She does continue to note intermittent thoracic pain, worse with prolonged working, sitting, although improved significantly from start of care.    Goal Status: PROGRESSING  Pt. will be independent with home exercise program in : 6 weeks. PROGRESSING  Pt. will be able to walk : 30 minutes;for exercise/recreation;for community mobility;Comment  Comment:: with LRAD in 6 weeks with improved gait pattern and without increased pain. PROGRESSING  Patient will twist/turn : in bed;with no pain;for bed mobilitiy;in 6 weeks. MET  Patient will sit: 60 minutes;for work;Comment  Comment: without increased pain in 6 weeks. MET  Patient will increase : LEFS score;for improved quality of life;in 6 weeks;by _ points  by ___ points: >= 9.  MET  Pt will: demonstrate improved gait speed: 4m walk test <= 4.0 sec for decreased risk of falls in 6 weeks. MET  Pt will: demonstrate improved LE strength: APTA >= 9x for decreased risk of falls in 6 weeks. MET  Pt will: demonstrate improved functional mobility : TUG <= 13 sec for decreased risk of falls in 6 weeks. MET      Plan / Patient Education:     Continue per POC, progressing spinal stability and LE strength, static and dynamic standing balance ex as tolerated.  Assess LEWIS next visit.    Subjective:     Pain Ratin/10 upper/middle back at rest  Getting over sickness from last time.  No falls since last visit, but did have one trip where she was able to catch herself.  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  No longer with pain twisting/turning in bed. Able to tolerate about 60 min of sitting now without increased pain.  Up to about 20 min walking tolerance now, if using the cane.    Objective:     TUG: 10.1 sec without AD. Significantly improved from 17.7 sec on eval.  APTA 30 sec sit<>stand: 9x without UE A. Significantly improved from 5x on eval.  4m walk test: 3.9 sec without AD. Significantly improved from 6.1 sec on eval.    LEFS: 63/80 - significantly improved from 36/80 on eval.    CGA required for dynamic standing balance ex due to intermittent LOB, equally in all directions.  Exercises:  Exercise #1: Supine PPT with marches, bridges, SLR, clamshells - H  Comment #1: HEP  Exercise #2: Scap retractions - H  Comment #2: HEP - progress to prone Is as tolerated  Exercise #3: FT/EC on firm - H  Comment #3: HEP  Exercise #4: Standing marches, hip ABD, knee flexion, ankle PF/DF - H  Comment #4: HEP  Exercise #5: Tandem forwards, backwards, sidestepping 3x15 ft each along HR  Comment #5: sit<>stands: 2x10  Exercise #6: Stationary bike level 2x4 min  Comment #6: Alternate toe tapping with  cone: 15x B  Exercise #7: Stepping on/off Airex pad: 10x F/L/R  Comment #7: 4Square stepping: F/B/L/R/diagonal x5 min      Treatment Today     TREATMENT MINUTES COMMENTS   Evaluation     Self-care/ Home management     Manual therapy     Neuromuscular Re-education 25 Balance ex per flow sheet   Therapeutic Activity     Therapeutic Exercises  Ex per flow sheet   Gait training     Modality__________________     Physical Performance Test 15 Per Above         Total 40    Blank areas are intentional and mean the treatment did not include these items.       Horacio Baker  12/26/2019

## 2021-06-04 NOTE — PROGRESS NOTES
"[facility] Daily Progress     Patient Name: Chaya Bean  Date: 12/9/2019  Visit #: 4  PTA visit #:  1  PRECAUTIONS: PACEMAKER, h/o lumbar fusion in 1998, previous cervical surgery  Referral Diagnosis:   R29.898 (ICD-10-CM) - Weakness of both lower extremities   M54.14 (ICD-10-CM) - Radicular pain of thoracic region   M51.24 (ICD-10-CM) - Thoracic disc herniation   Z98.1 (ICD-10-CM) - History of lumbar fusion   R26.89 (ICD-10-CM) - Balance problem      Referring provider: Gillian Seay PA-C  Visit Diagnosis:     ICD-10-CM    1. Generalized muscle weakness M62.81    2. Unsteadiness on feet R26.81    3. Antalgic gait R26.89    4. Chronic midline thoracic back pain M54.6     G89.29          Assessment:     Based on falls assessment, patient is at an increased risk for falling. Plan of care will address this risk with lower extremity strengthening and balance training.  Pt also with chronic midline thoracic back pain with L-sided radiating pain. PER EMR, \" CT cervical spine showed a left paracentral disc herniation at T10-11 with ventral cord abutment and moderate left foraminal stenosis which correlates with her pain.  There is also moderate to severe left foraminal stenosis T11-T12 related to a disc protrusion.\"    HEP/POC compliance is  good .  Patient is appropriate to continue with skilled physical therapy intervention, as indicated by initial plan of care.    Goal Status: PROGRESSING  Pt. will be independent with home exercise program in : 6 weeks  Pt. will be able to walk : 30 minutes;for exercise/recreation;for community mobility;Comment  Comment:: with LRAD in 6 weeks with improved gait pattern and without increased pain.  Patient will twist/turn : in bed;with no pain;for bed mobilitiy;in 6 weeks  Patient will sit: 60 minutes;for work;Comment  Comment: without increased pain in 6 weeks.    Patient will increase : LEFS score;for improved quality of life;in 6 weeks;by _ points  by ___ points: >= " 9  Pt will: demonstrate improved gait speed: 4m walk test <= 4.0 sec for decreased risk of falls in 6 weeks.  Pt will: demonstrate improved LE strength: APTA >= 9x for decreased risk of falls in 6 weeks.  Pt will: demonstrate improved functional mobility : TUG <= 13 sec for decreased risk of falls in 6 weeks.      Plan / Patient Education:     Next visit: Review newly added PPT with marches and standing LE strength/balance ex.  Continue per POC, progressing spinal stability and LE strength, static and dynamic standing balance ex as tolerated.    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 Better overall.  Denies falls since last visit.  Doing HEP regularly. Up to 10, sometimes, 15 reps with the supine exercises.    Objective:     Pt demonstrates fair dynamic standing balance with newly added LE strength/balance ex. Able to self-correct with appropriate stepping and/or reaching strategies to maintain upright.  Fair TA contraction noted with PPT, but able to progress to added march today.  Mod sway continues to be present with FT/EC on firm.     Exercises:  Exercise #1: Supine PPT with marches, bridges, SLR, clamshells - H  Comment #1: 2x10 reps B; HEP; HEP; HEP  Exercise #2: Scap retractions - H  Comment #2: HEP - progress to prone Is as tolerated  Exercise #3: FT/EC on firm - H  Comment #3: x60 sec  Exercise #4: Standing marches, hip ABD, knee flexion, ankle PF/DF - H  Comment #4: 15x B each  Exercise #5: FT/EO on pillow: x60 sec, FT/EC on pillow: 2x30 sec      Treatment Today     TREATMENT MINUTES COMMENTS   Evaluation     Self-care/ Home management     Manual therapy     Neuromuscular Re-education 18 Balance ex per flow sheet   Therapeutic Activity     Therapeutic Exercises 8 Ex per flow sheet   Gait training     Modality__________________                Total 26    Blank areas are  intentional and mean the treatment did not include these items.       Horacio Baker  12/9/2019

## 2021-06-04 NOTE — PROGRESS NOTES
[facility] Daily Progress     Patient Name: Chaya Bean  Date: 1/2/2020  Visit #: 7  PTA visit #:  1  PRECAUTIONS: PACEMAKER, h/o lumbar fusion in 1998, previous cervical surgery  Referral Diagnosis:   R29.898 (ICD-10-CM) - Weakness of both lower extremities   M54.14 (ICD-10-CM) - Radicular pain of thoracic region   M51.24 (ICD-10-CM) - Thoracic disc herniation   Z98.1 (ICD-10-CM) - History of lumbar fusion   R26.89 (ICD-10-CM) - Balance problem      Referring provider: Gillian Seay PA-C  Visit Diagnosis:     ICD-10-CM    1. Generalized muscle weakness M62.81    2. Unsteadiness on feet R26.81    3. Antalgic gait R26.89    4. Chronic midline thoracic back pain M54.6     G89.29          Assessment:     HEP/POC compliance is  good .  Patient is benefitting from skilled physical therapy and is making steady progress toward functional goals.  Patient is appropriate to continue with skilled physical therapy intervention, as indicated by initial plan of care.     Overall, pain is reporting and demonstrating significantly improved pain, strength, and balance from start of care. Her reassessment on 12/26 reveals significantly decreased risk of future falls compared to start of care; however, LEWIS testing today reveals continued instability. She does continue to note intermittent thoracic pain, worse with prolonged working, sitting, although improved significantly from start of care.    Goal Status:   Pt. will be independent with home exercise program in : 6 weeks. PROGRESSING  Pt. will be able to walk : 30 minutes;for exercise/recreation;for community mobility;Comment  Comment:: with LRAD in 6 weeks with improved gait pattern and without increased pain. PROGRESSING  Patient will twist/turn : in bed;with no pain;for bed mobilitiy;in 6 weeks. MET  Patient will sit: 60 minutes;for work;Comment  Comment: without increased pain in 6 weeks. MET  Patient will increase : LEFS score;for improved quality of life;in 6  weeks;by _ points  by ___ points: >= 9. MET  Pt will: demonstrate improved gait speed: 4m walk test <= 4.0 sec for decreased risk of falls in 6 weeks. MET  Pt will: demonstrate improved LE strength: APTA >= 9x for decreased risk of falls in 6 weeks. MET  Pt will: demonstrate improved functional mobility : TUG <= 13 sec for decreased risk of falls in 6 weeks. MET  Update 20: Pt will demonstrate improved balance: LEWIS >= 48/56 for decreased risk of falls in 6 weeks.    Plan / Patient Education:     Continue per POC, progressing spinal stability and LE strength, static and dynamic standing balance ex as tolerated.  Trial prone Is next visit.  Continue static/dynamic standing balance training in clinic.    Subjective:     Pain Ratin/10 upper/middle back at rest  No falls since last visit. Still feels as though balance needs the greatest work.    Objective:     LEWIS/56- indicating increased falls risk.  Modified FT/EC today for patient to perform with feet slightly (about 1 inch) apart for appropriate level of difficulty.    From  visit:  TUG: 10.1 sec without AD. Significantly improved from 17.7 sec on eval.  APTA 30 sec sit<>stand: 9x without UE A. Significantly improved from 5x on eval.  4m walk test: 3.9 sec without AD. Significantly improved from 6.1 sec on eval.  LEFS: 63/80 - significantly improved from 36/80 on eval.    CGA required for dynamic standing balance ex due to intermittent LOB, equally in all directions.  Exercises:  Exercise #1: Supine PPT with marches, bridges, SLR, clamshells - H  Comment #1: HEP  Exercise #2: Scap retractions - H  Comment #2: HEP - progress to prone Is as tolerated  Exercise #3: FT/EC on firm - H  Comment #3: 2x60 sec (feet 1 inch apart)  Exercise #4: Standing marches, hip ABD, knee flexion, ankle PF/DF - H  Comment #4: 15x B each  Exercise #5: Tandem forwards, backwards, sidestepping 3x15 ft each along HR  Comment #5: sit<>stands: 2x10  Exercise #6: Stationary  bike level 2x4 min  Comment #6: Alternate toe tapping with cone: 15x B  Exercise #7: Stepping on/off Airex pad: 10x F/L/R  Comment #7: 4Square stepping: F/B/L/R/diagonal x5 min      Treatment Today     TREATMENT MINUTES COMMENTS   Evaluation     Self-care/ Home management     Manual therapy     Neuromuscular Re-education 12 Balance ex per flow sheet   Therapeutic Activity     Therapeutic Exercises  Ex per flow sheet   Gait training     Modality__________________     Physical Performance Test 15 Per Above         Total 27    Blank areas are intentional and mean the treatment did not include these items.       Horacio Baker  1/2/2020

## 2021-06-04 NOTE — PROGRESS NOTES
"[facility] Daily Progress     Patient Name: Chaya Bean  Date: 12/19/2019  Visit #: 5  PTA visit #:  1  PRECAUTIONS: PACEMAKER, h/o lumbar fusion in 1998, previous cervical surgery  Referral Diagnosis:   R29.898 (ICD-10-CM) - Weakness of both lower extremities   M54.14 (ICD-10-CM) - Radicular pain of thoracic region   M51.24 (ICD-10-CM) - Thoracic disc herniation   Z98.1 (ICD-10-CM) - History of lumbar fusion   R26.89 (ICD-10-CM) - Balance problem      Referring provider: Gillian Seay PA-C  Visit Diagnosis:     ICD-10-CM    1. Generalized muscle weakness M62.81    2. Unsteadiness on feet R26.81    3. Antalgic gait R26.89    4. Chronic midline thoracic back pain M54.6     G89.29          Assessment:     Based on falls assessment, patient is at an increased risk for falling. Plan of care will address this risk with lower extremity strengthening and balance training.  Pt also with chronic midline thoracic back pain with L-sided radiating pain. PER EMR, \" CT cervical spine showed a left paracentral disc herniation at T10-11 with ventral cord abutment and moderate left foraminal stenosis which correlates with her pain.  There is also moderate to severe left foraminal stenosis T11-T12 related to a disc protrusion.\"    HEP/POC compliance is  good .  Patient is appropriate to continue with skilled physical therapy intervention, as indicated by initial plan of care.    Goal Status: PROGRESSING  Pt. will be independent with home exercise program in : 6 weeks  Pt. will be able to walk : 30 minutes;for exercise/recreation;for community mobility;Comment  Comment:: with LRAD in 6 weeks with improved gait pattern and without increased pain.  Patient will twist/turn : in bed;with no pain;for bed mobilitiy;in 6 weeks  Patient will sit: 60 minutes;for work;Comment  Comment: without increased pain in 6 weeks.    Patient will increase : LEFS score;for improved quality of life;in 6 weeks;by _ points  by ___ points: >= " 9  Pt will: demonstrate improved gait speed: 4m walk test <= 4.0 sec for decreased risk of falls in 6 weeks.  Pt will: demonstrate improved LE strength: APTA >= 9x for decreased risk of falls in 6 weeks.  Pt will: demonstrate improved functional mobility : TUG <= 13 sec for decreased risk of falls in 6 weeks.      Plan / Patient Education:     Continue per POC, progressing spinal stability and LE strength, static and dynamic standing balance ex as tolerated.    Subjective:     Pain Ratin/10 at rest  Has been pretty sick since last visit, so not able to do much of her exercises, although she did really want to.  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 Better overall.  Denies falls since last visit.    Objective:     Pt demonstrates fair dynamic standing balance with newly added LE strength/balance ex. Able to self-correct with appropriate stepping and/or reaching strategies to maintain upright.    Exercises:  Exercise #1: Supine PPT with marches, bridges, SLR, clamshells - H  Comment #1: HEP  Exercise #2: Scap retractions - H  Comment #2: HEP - progress to prone Is as tolerated  Exercise #3: FT/EC on firm - H  Comment #3: HEP  Exercise #4: Standing marches, hip ABD, knee flexion, ankle PF/DF - H  Comment #4: 15x B  Exercise #5: Tandem forwards, backwards, sidestepping 3x15 ft each along HR  Comment #5: sit<>stands: 2x10  Exercise #6: Stationary bike level 2x4 min      Treatment Today     TREATMENT MINUTES COMMENTS   Evaluation     Self-care/ Home management     Manual therapy     Neuromuscular Re-education 18 Balance ex per flow sheet   Therapeutic Activity     Therapeutic Exercises 10 Ex per flow sheet   Gait training     Modality__________________                Total 28    Blank areas are intentional and mean the treatment did not include these items.       Horacio Baker  2019

## 2021-06-05 NOTE — PROGRESS NOTES
Assessment:   Chaya Bean is a 64 y.o. y.o. female with past medical history significant for obstructive sleep apnea, hypercholesterolemia, hypertension, pacemaker in place, history of GI bleed, hypothyroidism who presents today for follow-up regarding 2 areas of pain.  1.  Chronic left mid back pain which radiates around the left flank and left abdomen.  CT thoracic spine showed a left paracentral disc herniations at T10-11 with ventral cord abutment and moderate left foraminal stenosis which correlates with her pain.  There is also moderate to severe foraminal stenosis T11-T12 related to this protrusion.  Patient has had 8 sessions of physical therapy with greater than 50% improvement in her pain.  2.  Acute right low back pain with radiation into the right buttock, hip, and lateral thigh.  Patient has a history of multilevel lumbar fusion 1998.  CT lumbar spine shows solid interbody fusion L2-3 through L5-S1.  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  There is advanced disc degeneration L1-L2 and T12-L1 but no high-grade spinal canal stenosis or neuroforaminal stenosis.  I suspect that this pain was related to SI joint dysfunction as/or trochanteric bursitis.  Pain has improved by 75% over the past 3 days since beginning oral prednisone prescribed by her primary care provider.         Plan:     A shared decision making plan was used.  The patient's values and choices were respected.  The following represents what was discussed and decided upon by the physician assistant and the patient.      1.  DIAGNOSTIC TESTS: I reviewed the CT scans of the thoracic and lumbar spine.  No further diagnostic tests were ordered.    2.  PHYSICAL THERAPY: Patient is currently in physical therapy.  She has had 8 sessions of far.  Encouraged to continue with physical therapy and the home exercises.    3.  MEDICATIONS:    -Patient will continue to gabapentin 900 mg at bedtime patient has tried taking this  medication in the day previously and did not tolerate it due to drowsiness.  -Patient is currently on a course of prednisone prescribed her primary care provider.  Encouraged her to finish this course.  -Patient states that she occasionally uses Advil.  I recommend she not use Advil given comorbidities including history of GI bleed.  -Patient could also use Tylenol as needed, but she states that this tends to not help.    4.  INTERVENTIONS: No interventions were ordered.  If thoracic radicular pain fails to improve, recommend a T12-L1 interlaminar epidural steroid injection.      5.  PATIENT EDUCATION:  Patient is in agreement the above plan.  All questions were answered.    6.  FOLLOW-UP: Patient will follow-up with me as needed.  If she has questions or concerns, she should not hesitate to call.    Subjective:     Chaya Bean is a 64 y.o. female who presents today for follow-up regarding chronic mid back pain with radiation around the left flank into the abdomen.  I last saw the patient on December 5, 2019.  At that time she reported 25% improvement in her pain with physical therapy.  I recommend she continue with physical therapy.  She has had 8 sessions.  Patient reports at least 50% improvement in his pain.    Patient complains of only mild residual left-sided pain at the thoracolumbar junction.  She has pain that wraps around the left lower ribs toward the left flank.  She denies any pain wrapping around to the left abdomen.    Patient also complains of a one-week history of right-sided low back pain radiating into the right buttock, hip, lateral thigh.  Patient states the pain began after doing some resistance band exercises for physical therapy.  She has some chronic low back pain with bilateral lower extremity numbness and weakness which has been present since remote lumbar fusion surgery.  However, this type of pain was new and different for her.  She saw her primary care provider on Monday.  They  prescribed prednisone.  She notes LE 75% improvement in that pain over the past several days.  She has only mild residual pain which begins at the right lumbosacral junction.  Pain radiates to the lateral hip.  Previously was radiating down the lateral thigh to the knee, but is not radiating that far now.  She denies any significant numbness or tingling down the legs.  She states that the weakness in her legs is chronic and stable.  She states she had difficulty laying on her right side when the pain was severe.      Overall, patient rates her pain today as a 4-10.  At its worst it is a 9-10.  At its best it is a 1-10.  Pain is aggravated with walking, laying, sitting.  Pain is alleviated with prednisone.    As mentioned above, patient is currently in physical therapy and has had 8 sessions of far. She is using gabapentin 900 mg at bedtime.  She is also on a course of prednisone.  She states that when pain is very severe she also takes Advil.      Past medical history is reviewed and is pertinent for starting prednisone.    Review of Systems:  Positive for weakness.  Negative for numbness/tingling, loss of bowel control, difficulty swallowing, difficulty with hand skills, fevers, unintentional weight loss.     Objective:   CONSTITUTIONAL:  Vital signs as above.  No acute distress.  The patient is well nourished and well groomed.    PSYCHIATRIC:  The patient is awake, alert, oriented to person, place and time.  The patient is answering questions appropriately with clear speech.  Normal affect.  HEENT: Normocephalic, atraumatic.  Sclera clear.    SKIN:  Skin over the face, posterior torso, bilateral upper and lower extremities is clean, dry, intact without rashes.  MUSCULOSKELETAL: Tender palpation left mid and lower thoracic paraspinous muscles.  Tender to palpation bilateral lower lumbar paraspinous muscles. Tender to palpation right greater trochanter.  The patient has 5/5 strength for the bilateral hip flexors,  knee flexors/extensors, ankle dorsiflexors/plantar flexors, great toe extensors, ankle evertors/invertors.   NEUROLOGICAL: 2+ patellar, achilles reflexes which are symmetric bilaterally.  No ankle clonus bilaterally.  Sensation to light touch is intact in the bilateral L4, L5, and S1 dermatomes.       RESULTS:    I reviewed the CT thoracic spine from [facility] dated the 27th 2019.  This shows diffuse thoracic and upper lumbar disc degeneration with mild to moderate thoracal lumbar scoliosis.  There is a 4 to 5 mm broad-based left paracentral disc herniation at T10-11 with ventral cord abutment and moderate left foraminal stenosis.  At T11-T12 there is moderate to severe left foraminal stenosis related to a left lateral disc protrusion.  There is a suspected 2 to 3 mm left posterolateral disc protrusion T8-9.  There is moderate foraminal stenosis on the right at T12-L1.  Please see report for further details.    I reviewed the CT lumbar spine from [facility] dated November 15, 2019.  This shows a hemivertebral segment on the right between L4 and L5 causing pronounced dextrocurvature.  There is solid interbody fusion L2-3 through L5-S1 with no significant central canal or neuroforaminal stenosis.  There is advanced disc degeneration L1-L2 and moderate disc degeneration T12-L1.  Please see report for further details.

## 2021-06-05 NOTE — PROGRESS NOTES
[facility] Daily Progress     Patient Name: Chaya Bean  Date: 1/20/2020  Visit #: 9  PTA visit #:  1  PRECAUTIONS: PACEMAKER, h/o lumbar fusion in 1998, previous cervical surgery  Referral Diagnosis:   R29.898 (ICD-10-CM) - Weakness of both lower extremities   M54.14 (ICD-10-CM) - Radicular pain of thoracic region   M51.24 (ICD-10-CM) - Thoracic disc herniation   Z98.1 (ICD-10-CM) - History of lumbar fusion   R26.89 (ICD-10-CM) - Balance problem      Referring provider: Gillian Seay PA-C  Visit Diagnosis:     ICD-10-CM    1. Generalized muscle weakness M62.81    2. Unsteadiness on feet R26.81    3. Antalgic gait R26.89    4. Chronic midline thoracic back pain M54.6     G89.29          Assessment:     HEP/POC compliance is  good .  Patient is benefitting from skilled physical therapy and is making steady progress toward functional goals.  Patient is appropriate to continue with skilled physical therapy intervention, as indicated by initial plan of care.     Overall, patient is reporting and demonstrating significantly improved pain, strength, and balance from start of care. Her reassessment on 12/26 reveals significantly decreased risk of future falls compared to start of care; however, further LEWIS testing on 1/2 reveals continued instability. She does continue to note intermittent thoracic pain, worse with prolonged working, sitting; although this is improved significantly from start of care.  Her newly added R hip/thigh pain likely the result of incorrect performance of TB pull down ex.    Goal Status:   Pt. will be independent with home exercise program in : 6 weeks. PROGRESSING  Pt. will be able to walk : 30 minutes;for exercise/recreation;for community mobility;Comment  Comment:: with LRAD in 6 weeks with improved gait pattern and without increased pain. PROGRESSING  Patient will twist/turn : in bed;with no pain;for bed mobilitiy;in 6 weeks. MET  Patient will sit: 60 minutes;for  work;Comment  Comment: without increased pain in 6 weeks. MET  Patient will increase : LEFS score;for improved quality of life;in 6 weeks;by _ points  by ___ points: >= 9. MET  Pt will: demonstrate improved gait speed: 4m walk test <= 4.0 sec for decreased risk of falls in 6 weeks. MET  Pt will: demonstrate improved LE strength: APTA >= 9x for decreased risk of falls in 6 weeks. MET  Pt will: demonstrate improved functional mobility : TUG <= 13 sec for decreased risk of falls in 6 weeks. MET  Update 20: Pt will demonstrate improved balance: LEWIS >= 48/56 for decreased risk of falls in 6 weeks.    Plan / Patient Education:     Continue per POC, progressing spinal stability and LE strength, static and dynamic standing balance ex as tolerated.  Review TB pull downs for correct performance.  Continue static/dynamic standing balance training in clinic.    Subjective:     Pain Rating: Did not rate today, but comments that overall it is much better than before.  Tried closing eyes this AM in the shower, but felt very unsteady and opened them right away.  No falls since last visit. Finding laundry to be getting a lot easier and less painful.  Of note, she has been having some R hip and anterior/lateral thigh pain lately, which on later review, is likely due to incorrect performance of OTB pull downs. With correct performance, patient able to perform without pain. She has been on prednisone for this over the past week, with several days remaining.    Objective:     Good TA contraction present with newly progressed supine core ex.  Pt performing excessive hip anterior translation during TB pull downs, creating increased pain. With cues for correct performance, no pain present.  CGA required for dynamic standing balance ex due to occasional LOB requiring min A from PT to maintain upright.    From 20 visit:  LEWIS/56- indicating increased falls risk.    From  visit:  TUG: 10.1 sec without AD. Significantly  improved from 17.7 sec on eval.  APTA 30 sec sit<>stand: 9x without UE A. Significantly improved from 5x on eval.  4m walk test: 3.9 sec without AD. Significantly improved from 6.1 sec on eval.  LEFS: 63/80 - significantly improved from 36/80 on eval.    Exercises:  Exercise #1: Supine PPT with alternating LE extension, bridges, clamshells - H  Comment #1: 10x B; 10x3 sec; HEP  Exercise #2: Scap retractions > TB pull downs - H  Comment #2: 5x > 15x with OTB  Exercise #3: FT/EC on firm - H  Comment #3: HEP  Exercise #4: Standing marches, hip ABD, knee flexion, ankle PF/DF - H  Comment #4: verbal review  Exercise #5: Tandem forwards, backwards, sidestepping 3x15 ft each along HR  Comment #5: sit<>stands: 2x10  Exercise #6: Stationary bike level 2x4 min  Comment #6: Alternate toe tapping with cone: 15x B  Exercise #7: Stepping on/off Airex pad: 10x F/L/R  Comment #7: 4Square stepping: F/B/L/R/diagonal x5 min      Treatment Today     TREATMENT MINUTES COMMENTS   Evaluation     Self-care/ Home management     Manual therapy     Neuromuscular Re-education 10 Balance ex per flow sheet   Therapeutic Activity     Therapeutic Exercises 15 Ex per flow sheet   Gait training     Modality__________________     Physical Performance Test           Total 25    Blank areas are intentional and mean the treatment did not include these items.       Horacio Baker  1/20/2020

## 2021-06-05 NOTE — PROGRESS NOTES
[facility] Daily Progress     Patient Name: Chaya Bean  Date: 1/7/2020  Visit #: 8  PTA visit #:  1  PRECAUTIONS: PACEMAKER, h/o lumbar fusion in 1998, previous cervical surgery  Referral Diagnosis:   R29.898 (ICD-10-CM) - Weakness of both lower extremities   M54.14 (ICD-10-CM) - Radicular pain of thoracic region   M51.24 (ICD-10-CM) - Thoracic disc herniation   Z98.1 (ICD-10-CM) - History of lumbar fusion   R26.89 (ICD-10-CM) - Balance problem      Referring provider: Gillian Seay PA-C  Visit Diagnosis:     ICD-10-CM    1. Generalized muscle weakness M62.81    2. Unsteadiness on feet R26.81    3. Antalgic gait R26.89    4. Chronic midline thoracic back pain M54.6     G89.29          Assessment:     HEP/POC compliance is  good .  Patient is benefitting from skilled physical therapy and is making steady progress toward functional goals.  Patient is appropriate to continue with skilled physical therapy intervention, as indicated by initial plan of care.     Overall, patient is reporting and demonstrating significantly improved pain, strength, and balance from start of care. Her reassessment on 12/26 reveals significantly decreased risk of future falls compared to start of care; however, further LEWIS testing on 1/2 reveals continued instability. She does continue to note intermittent thoracic pain, worse with prolonged working, sitting; although this is improved significantly from start of care.    Goal Status:   Pt. will be independent with home exercise program in : 6 weeks. PROGRESSING  Pt. will be able to walk : 30 minutes;for exercise/recreation;for community mobility;Comment  Comment:: with LRAD in 6 weeks with improved gait pattern and without increased pain. PROGRESSING  Patient will twist/turn : in bed;with no pain;for bed mobilitiy;in 6 weeks. MET  Patient will sit: 60 minutes;for work;Comment  Comment: without increased pain in 6 weeks. MET  Patient will increase : LEFS score;for improved  quality of life;in 6 weeks;by _ points  by ___ points: >= 9. MET  Pt will: demonstrate improved gait speed: 4m walk test <= 4.0 sec for decreased risk of falls in 6 weeks. MET  Pt will: demonstrate improved LE strength: APTA >= 9x for decreased risk of falls in 6 weeks. MET  Pt will: demonstrate improved functional mobility : TUG <= 13 sec for decreased risk of falls in 6 weeks. MET  Update 20: Pt will demonstrate improved balance: LEWIS >= 48/56 for decreased risk of falls in 6 weeks.    Plan / Patient Education:     Continue per POC, progressing spinal stability and LE strength, static and dynamic standing balance ex as tolerated.  Review supine core ex and TB pull downs.  Continue static/dynamic standing balance training in clinic.    Subjective:     Pain Rating: Did not rate today, but comments that overall it is much better than before.  No falls since last visit. Still feels as though balance needs the greatest work.    Objective:     Progressed supine core ex and scap retractor strengthening today to help further improve spinal stability and reduce pain.  CGA required for dynamic standing balance ex due to occasional LOB requiring min A from PT to maintain upright.    From 20 visit:  LEWIS/56- indicating increased falls risk.    From  visit:  TUG: 10.1 sec without AD. Significantly improved from 17.7 sec on eval.  APTA 30 sec sit<>stand: 9x without UE A. Significantly improved from 5x on eval.  4m walk test: 3.9 sec without AD. Significantly improved from 6.1 sec on eval.  LEFS: 63/80 - significantly improved from 36/80 on eval.    Exercises:  Exercise #1: Supine PPT with alternating LE extension, bridges, clamshells - H  Comment #1: 2 sets of 10x3 sec bilat; HEP; HEP  Exercise #2: Scap retractions > TB pull downs - H  Comment #2: 5x > 10x with OTB  Exercise #3: FT/EC on firm - H  Comment #3: HEP  Exercise #4: Standing marches, hip ABD, knee flexion, ankle PF/DF - H  Comment #4: HEP  Exercise  #5: Tandem forwards, backwards, sidestepping 3x15 ft each along HR  Comment #5: sit<>stands: 2x10  Exercise #6: Stationary bike level 2x4 min  Comment #6: Alternate toe tapping with cone: 15x B  Exercise #7: Stepping on/off Airex pad: 10x F/L/R  Comment #7: 4Square stepping: F/B/L/R/diagonal x5 min      Treatment Today     TREATMENT MINUTES COMMENTS   Evaluation     Self-care/ Home management     Manual therapy     Neuromuscular Re-education 25 Balance ex per flow sheet   Therapeutic Activity     Therapeutic Exercises 10 Ex per flow sheet   Gait training     Modality__________________     Physical Performance Test           Total 35    Blank areas are intentional and mean the treatment did not include these items.       Horacio Baker  1/7/2020

## 2021-06-05 NOTE — PROGRESS NOTES
[facility] Daily Progress     Patient Name: Chaya Bean  Date: 1/28/2020  Visit #: 10  PTA visit #:  1  PRECAUTIONS: PACEMAKER, h/o lumbar fusion in 1998, previous cervical surgery  Referral Diagnosis:   R29.898 (ICD-10-CM) - Weakness of both lower extremities   M54.14 (ICD-10-CM) - Radicular pain of thoracic region   M51.24 (ICD-10-CM) - Thoracic disc herniation   Z98.1 (ICD-10-CM) - History of lumbar fusion   R26.89 (ICD-10-CM) - Balance problem      Referring provider: Gillian Seay PA-C  Visit Diagnosis:     ICD-10-CM    1. Generalized muscle weakness M62.81    2. Unsteadiness on feet R26.81    3. Antalgic gait R26.89    4. Chronic midline thoracic back pain M54.6     G89.29          Assessment:     HEP/POC compliance is  good .  Patient is benefitting from skilled physical therapy and is making steady progress toward functional goals.  Patient is appropriate to continue with skilled physical therapy intervention, as indicated by initial plan of care.     Overall, patient is reporting and demonstrating significantly improved pain, strength, and balance from start of care. Her reassessment on 12/26 reveals significantly decreased risk of future falls compared to start of care; however, further LEWIS testing on 1/2 reveals continued instability. She does continue to note intermittent thoracic pain, worse with prolonged working, sitting; although this is improved significantly from start of care.  Her previously reported hip pain is now resolved with correct performance of TB pull downs.    Goal Status:   Pt. will be independent with home exercise program in : 6 weeks. PROGRESSING  Pt. will be able to walk : 30 minutes;for exercise/recreation;for community mobility;Comment  Comment:: with LRAD in 6 weeks with improved gait pattern and without increased pain. PROGRESSING  Patient will twist/turn : in bed;with no pain;for bed mobilitiy;in 6 weeks. MET  Patient will sit: 60 minutes;for  work;Comment  Comment: without increased pain in 6 weeks. MET  Patient will increase : LEFS score;for improved quality of life;in 6 weeks;by _ points  by ___ points: >= 9. MET  Pt will: demonstrate improved gait speed: 4m walk test <= 4.0 sec for decreased risk of falls in 6 weeks. MET  Pt will: demonstrate improved LE strength: APTA >= 9x for decreased risk of falls in 6 weeks. MET  Pt will: demonstrate improved functional mobility : TUG <= 13 sec for decreased risk of falls in 6 weeks. MET  Update 20: Pt will demonstrate improved balance: LEWIS >= 48/56 for decreased risk of falls in 6 weeks.    Plan / Patient Education:     Continue static/dynamic standing balance training in clinic.  Reassess LEWIS and walking tolerance next visit.    Subjective:     Pain Rating: Did not rate today, but comments that overall it is much better than before.  Reports previous hip pain is much better; no longer an issue. Continuing to do OTB pull downs.  No falls.    Objective:     On review of OTB pull downs from previous visit, patient with good performance and no pain.  CGA required for dynamic standing balance ex due to occasional LOB requiring min A from PT to maintain upright.  Pt did require two seated rest breaks during session due to LE fatigue and mild shortness of breath.    From 20 visit:  LEWIS/56- indicating increased falls risk.    From  visit:  TUG: 10.1 sec without AD. Significantly improved from 17.7 sec on eval.  APTA 30 sec sit<>stand: 9x without UE A. Significantly improved from 5x on eval.  4m walk test: 3.9 sec without AD. Significantly improved from 6.1 sec on eval.  LEFS: 63/80 - significantly improved from 36/80 on eval.    Exercises:  Exercise #1: Supine PPT with alternating LE extension, bridges, clamshells - H  Comment #1: HEP  Exercise #2: Scap retractions > TB pull downs - H  Comment #2: HEP > 15x with OTB  Exercise #3: FT/EC on firm - H  Comment #3: 2x60 sec (mild to mod sway at times  with occasional reaching response)  Exercise #4: Standing marches, hip ABD, knee flexion, ankle PF/DF - H  Comment #4: HEP  Exercise #5: Tandem forwards, backwards, sidestepping, grapevining 2x40 ft each along HR/with HHA  Comment #5: sit<>stands: 2x10  Exercise #6: Stationary bike level 2x5 min  Comment #6: Alternate toe tapping with cone: 15x B  Exercise #7: Step Ups: 10x bilat forwards with 6 inch step (HR nearby but trying not to use)  Comment #7: 4Square stepping: F/B/L/R/diagonal x5 min      Treatment Today     TREATMENT MINUTES COMMENTS   Evaluation     Self-care/ Home management     Manual therapy     Neuromuscular Re-education 24 Balance ex per flow sheet   Therapeutic Activity     Therapeutic Exercises 8 Ex per flow sheet   Gait training     Modality__________________     Physical Performance Test           Total 33    Blank areas are intentional and mean the treatment did not include these items.       Horacio Baker  1/28/2020

## 2021-06-06 NOTE — TELEPHONE ENCOUNTER
Patient returning call. Explained we would like to know if she is still taking Gabapentin and if so, how she is taking it. She reports she is taking 2 capsules at bedtime only. Explained new prescription will be sent in for her. Stated understanding.     Please refuse the prescription pended last week and review the updated prescription. This is Gillian SHETTY patient.

## 2021-06-06 NOTE — TELEPHONE ENCOUNTER
LM with family member to have patient call Sp[facility] back. If patient calls back please verify dosing.

## 2021-06-06 NOTE — PROGRESS NOTES
[facility] Daily Progress     Patient Name: Chaya Bean  Date: 2/11/2020  Visit #: 11  PTA visit #:  1  PRECAUTIONS: PACEMAKER, h/o lumbar fusion in 1998, previous cervical surgery  Referral Diagnosis:   R29.898 (ICD-10-CM) - Weakness of both lower extremities   M54.14 (ICD-10-CM) - Radicular pain of thoracic region   M51.24 (ICD-10-CM) - Thoracic disc herniation   Z98.1 (ICD-10-CM) - History of lumbar fusion   R26.89 (ICD-10-CM) - Balance problem      Referring provider: Gillian Seay PA-C  Visit Diagnosis:     ICD-10-CM    1. Generalized muscle weakness M62.81    2. Unsteadiness on feet R26.81    3. Antalgic gait R26.89    4. Chronic midline thoracic back pain M54.6     G89.29          Assessment:     All goals MET, with patient reporting and demonstrating significantly improved pain, strength, functional status, and reduced fall risk from start of care. Reassessment today reveals patient is at relatively low risk for falls.    Goal Status:   Pt. will be independent with home exercise program in : 6 weeks. MET  Pt. will be able to walk : 30 minutes;for exercise/recreation;for community mobility;Comment  Comment:: with LRAD in 6 weeks with improved gait pattern and without increased pain. MET  Patient will twist/turn : in bed;with no pain;for bed mobilitiy;in 6 weeks. MET  Patient will sit: 60 minutes;for work;Comment  Comment: without increased pain in 6 weeks. MET  Patient will increase : LEFS score;for improved quality of life;in 6 weeks;by _ points  by ___ points: >= 9. MET  Pt will: demonstrate improved gait speed: 4m walk test <= 4.0 sec for decreased risk of falls in 6 weeks. MET  Pt will: demonstrate improved LE strength: APTA >= 9x for decreased risk of falls in 6 weeks. MET  Pt will: demonstrate improved functional mobility : TUG <= 13 sec for decreased risk of falls in 6 weeks. MET  Update 1/2/20: Pt will demonstrate improved balance: LEWIS >= 48/56 for decreased risk of falls in 6 weeks.  MET    Plan / Patient Education:     Discharge from PT. Encouraged to continue with regular, daily HEP performance.    Subjective:     Pain Rating: Did not rate today, but comments that overall it is much better than before.  No falls since last visit. Has been very careful.  Reports well over 30 min ambulation tolerance with SEC at this point. Did a lot of walking over at [employer] the other day.  No issues with sitting. Continuing to do HEP regularly.    Objective:     LEWIS/56- significantly improved from 41/56 on 20.  TU.8 sec without AD. Significantly improved from 17.7 sec on eval.  APTA 30 sec sit<>stand: 10x without UE A. Significantly improved from 5x on eval.  4m walk test: 3.5 sec without AD. Significantly improved from 6.1 sec on eval.  LEFS: 63/80 - significantly improved from 36/80 on eval.    Exercises:  Exercise #1: Supine PPT with alternating LE extension, bridges, clamshells - H  Comment #1: HEP  Exercise #2: Scap retractions > TB pull downs - H  Comment #2: HEP > 15x with OTB  Exercise #3: FT/EC on firm - H  Comment #3: 2x60 sec (mild to mod sway at times with occasional reaching response)  Exercise #4: Standing marches, hip ABD, knee flexion, ankle PF/DF - H  Comment #4: HEP  Exercise #5: Tandem forwards, backwards, sidestepping, grapevining 2x40 ft each along HR/with HHA  Comment #5: sit<>stands: 2x10  Exercise #6: Stationary bike level 2x5 min  Comment #6: Alternate toe tapping with cone: 15x B  Exercise #7: Step Ups: 10x bilat forwards with 6 inch step (HR nearby but trying not to use)  Comment #7: 4Square stepping: F/B/L/R/diagonal x5 min      Treatment Today     TREATMENT MINUTES COMMENTS   Evaluation     Self-care/ Home management     Manual therapy     Neuromuscular Re-education     Therapeutic Activity     Therapeutic Exercises     Gait training     Modality__________________     Physical Performance Test 25 Per above         Total 25    Blank areas are intentional and mean the  treatment did not include these items.       Horacio Stephensabe  2/11/2020     [facility] Discharge Summary  Patient Name: Chaya Bean  Date: 2/11/2020  Referral Diagnosis:   R29.898 (ICD-10-CM) - Weakness of both lower extremities   M54.14 (ICD-10-CM) - Radicular pain of thoracic region   M51.24 (ICD-10-CM) - Thoracic disc herniation   Z98.1 (ICD-10-CM) - History of lumbar fusion   R26.89 (ICD-10-CM) - Balance problem      Referring provider: Gillian Seay PA-C  Visit Diagnosis:   1. Generalized muscle weakness     2. Unsteadiness on feet     3. Antalgic gait     4. Chronic midline thoracic back pain         Goals:  Pt. will be independent with home exercise program in : 6 weeks. MET  Pt. will be able to walk : 30 minutes;for exercise/recreation;for community mobility;Comment  Comment:: with LRAD in 6 weeks with improved gait pattern and without increased pain. MET  Patient will twist/turn : in bed;with no pain;for bed mobilitiy;in 6 weeks. MET  Patient will sit: 60 minutes;for work;Comment  Comment: without increased pain in 6 weeks. MET  Patient will increase : LEFS score;for improved quality of life;in 6 weeks;by _ points  by ___ points: >= 9. MET  Pt will: demonstrate improved gait speed: 4m walk test <= 4.0 sec for decreased risk of falls in 6 weeks. MET  Pt will: demonstrate improved LE strength: APTA >= 9x for decreased risk of falls in 6 weeks. MET  Pt will: demonstrate improved functional mobility : TUG <= 13 sec for decreased risk of falls in 6 weeks. MET  Update 1/2/20: Pt will demonstrate improved balance: LEWIS >= 48/56 for decreased risk of falls in 6 weeks. MET    Patient was seen for 11 visits from 11/19/19 to 2/11/20.  The patient met goals and has demonstrated understanding of and independence in the home program for self-care, and progression to next steps.  She will initiate contact if questions or concerns arise.  No further therapy is required at this time.    Therapy will be  discontinued at this time.  The patient will need a new referral to resume.    Thank you for your referral.  Horacio Baker  2/11/2020  3:56 PM

## 2021-06-07 NOTE — PROGRESS NOTES
Remote device check.  Please see link for full device report.  Patient was informed of results and next follow up via mail.   Marisela Lua RN

## 2021-06-09 NOTE — PROGRESS NOTES
Review of Systems - Cardiovascular ROS: positive for - edema in ankles   ALL OTHERS WNL    NO OTHER CONCERNS    VIDEO VISIT    Narda Guerrero, CMA

## 2021-06-09 NOTE — PATIENT INSTRUCTIONS - HE
Ms. Love,  Thank you for taking my video call today.  I am glad you are doing well, other than heart working harder on those high humid days.  Per our conversation, try half HCTZ those days so that maybe her blood pressures not too low.  I will plan on seeing you in a year with a device check or sooner if needed.  Stay safe.  Jacob Wick

## 2021-06-10 NOTE — PROGRESS NOTES
[facility] Follow-up Note    Assessment & Plan        1. Third Degree A-V Block-patient had pacemaker implanted in 1993, Medtronic device which she is utilizing 99% of the time in the atria and 1% of the time in the ventricle. She had a generator change out in 2008. Underlying rhythm is possibly sinus bradycardia versus junctional with heart rate around 30 bpm.     2. Syncopal episodes secondary to above and resolved.  She had episodes with this a year or so ago and I suspect this was due to the HCTZ and we discussed stopping this a year or so ago which she continued on her own.     3. Left Bundle Branch Block -stable with pacemaker implantation.     4. Essential hypertension with goal blood pressure less than 130/80 -blood pressure under good control but if anything a little bit low when she is lightheaded.  I suspect is from low blood pressure and it is coming from her HCTZ.  She states she takes HCTZ to help with peripheral edema but yet drinks a lot of fluid because she feels dehydrated from the HCTZ.  I recommended she discontinue the HCTZ and discontinue the fluids and reevaluate her swelling.     5. Hypercholesteremia -LDL 71 with a cholesterol 165 which is acceptable.     6.  Retention arrhythmias-on digoxin.    Plan  1.  Formal device check today and address if abnormal.  2.  Discussed patient holding HCTZ and holding fluids and try to reevaluate so her blood pressure gets higher.  3.  Follow-up in 1 year or sooner if needed.    Subjective  CC: 61-year-old white female being seen here in a yearly follow-up today.  Since I seen her she tells me she has had lightheaded spells lasting up to a minute.  There is no true syncope, shortness breath, PND, orthopnea.  She has complained of significant bipedal edema as well as nocturia from drinking a lot of fluid to try to catch up with the HCTZ causing her diuresis and dehydration.    Medications  Current Outpatient Prescriptions   Medication Sig  "Note     aspirin 81 MG EC tablet Take 81 mg by mouth daily.      citalopram (CELEXA) 40 MG tablet Take 60 mg by mouth daily.      cycloSPORINE (RESTASIS) 0.05 % ophthalmic emulsion Administer 1 drop to both eyes 2 (two) times a day.      digoxin (LANOXIN) 250 mcg tablet Take 250 mcg by mouth every morning.      DILANTIN 30 mg ER capsule Take 2 capsules by mouth every morning.      gabapentin (NEURONTIN) 300 MG capsule Take 300-600 mg by mouth 3 (three) times a day as needed. 3/26/2015: 3/26/2015: Dosing per pharmacy records is 600 mg three times daily scheduled, but per the patient and a prior medication history, the dosing is 300-600 mg three times daily as needed.     hydrochlorothiazide (HYDRODIURIL) 25 MG tablet Take 25 mg by mouth daily.      levETIRAcetam (KEPPRA) 250 MG tablet Take 250 mg by mouth 2 (two) times a day.      levothyroxine (SYNTHROID, LEVOTHROID) 125 MCG tablet Take 125 mcg by mouth Daily at 6:00 am.       peg 400-propylene glycol PF (SYSTANE, PF,) 0.4-0.3 % Dpet Apply 1 drop to eye 3 (three) times a day as needed.      phenytoin (DILANTIN) 100 MG ER capsule Take 100 mg by mouth every morning. Along with 2 - 30 mg capsules      phenytoin (DILANTIN) 100 MG ER capsule Take 200 mg by mouth bedtime.      potassium chloride (MICRO-K) 10 mEq CR capsule Take 10 mEq by mouth daily.      simvastatin (ZOCOR) 40 MG tablet Take 40 mg by mouth bedtime.        Objective  /64 (Patient Site: Left Arm, Patient Position: Sitting, Cuff Size: Adult Large)  Pulse 76 Comment: irregular  Resp 20  Ht 5' 5.5\" (1.664 m)  Wt 189 lb 3.2 oz (85.8 kg)  BMI 31.01 kg/m2    General Appearance:    Alert, cooperative, no distress, appears stated age   Head:    Normocephalic, without obvious abnormality, atraumatic   Throat:   Lips, mucosa, and tongue normal; teeth and gums normal   Neck:   Supple, symmetrical, trachea midline, no adenopathy;        thyroid:  No enlargement/tenderness/nodules; no carotid    bruit or JVD "   Back:     Symmetric, no curvature, ROM normal, no CVA tenderness   Lungs:     Clear to auscultation bilaterally, respirations unlabored   Chest wall:    No tenderness, left-sided pacemaker noted    Heart:    Regular rate and rhythm, S1 and S2 normal, no murmur, rub   or gallop   Abdomen:     Soft, non-tender, bowel sounds active all four quadrants,     no masses, no organomegaly   Extremities:   Normal, atraumatic, no cyanosis, mild bipedal edema 1/4    Pulses:   2+ and symmetric all extremities   Skin:   Skin color, texture, turgor normal, no rashes or lesions     Results    Lab Results personally reviewed   Lab Results   Component Value Date    CHOL 165 12/05/2016    CHOL 227 (H) 06/03/2015     Lab Results   Component Value Date    HDL 45 (L) 12/05/2016    HDL 52 06/03/2015     Lab Results   Component Value Date    LDLCALC 71 12/05/2016    LDLCALC 142 (H) 06/03/2015     Lab Results   Component Value Date    TRIG 245 (H) 12/05/2016    TRIG 165 (H) 06/03/2015     Lab Results   Component Value Date    WBC 6.9 12/05/2016    HGB 14.5 12/05/2016    HCT 42.1 12/05/2016     12/05/2016     Lab Results   Component Value Date    CREATININE 0.87 12/05/2016    BUN 23 (H) 12/05/2016     12/05/2016    K 4.0 12/05/2016    CO2 23 12/05/2016     Review of Systems:   General: WNL  Eyes: WNL  Ears/Nose/Throat: WNL  Lungs: Cough, Snoring  Heart: WNL  Stomach: Diarrhea  Bladder: WNL  Muscle/Joints: WNL  Skin: WNL  Nervous System: Daytime Sleepiness  Mental Health: WNL     Blood: WNL

## 2021-06-10 NOTE — PROGRESS NOTES
Type: remote pacemaker transmission done prior to clinic appointment with Dr Wick on 4/18/17.  Presenting rhythm: AP-VS, rate 78 bpm.  Battery/lead status: stable; estimated battery longevity 24 months, 7-39 months based on past history.  Arrhythmias: since 3/8/17, one AT/AF episode duration <1 minute. No ventricular high rate episodes detected.  Comments: normal magnet and pacemaker function. AP 99.7%,  0.7%. prd

## 2021-06-15 PROBLEM — Z95.0 CARDIAC PACEMAKER IN SITU: Status: ACTIVE | Noted: 2017-04-18

## 2021-06-16 PROBLEM — Z45.010 PACEMAKER BATTERY DEPLETION: Status: ACTIVE | Noted: 2019-07-11

## 2021-06-17 NOTE — PATIENT INSTRUCTIONS - HE
Patient Instructions by Horacio Baker PT at 1/7/2020  3:30 PM     Author: Horacio Baker PT Service: -- Author Type: Physical Therapist    Filed: 1/7/2020  3:54 PM Encounter Date: 1/7/2020 Status: Signed    : Horacio Baker PT (Physical Therapist)        ELASTIC BAND EXTENSION BILATERAL SHOULDER    Pull down, as you open the chest, roll your shoulders back, and squeeze your shoulder blades together.    Hold 2 seconds. 10-15 reps.  1x/day.      ABDOMINAL BRACING    While lying on your back, tighten your stomach muscles as you draw your navel down towards the floor.    BRACE - SINGLE KNEE EXTENSION    While lying on your back with knees bent, straighten out one knee while keeping the leg off the ground. Hold as indicated, then return to original position. Next, perform on the other leg.    Use your stomach muscles to keep your low back pushed down flat the entire time.    Hold 2 seconds. 10x each leg.  1-2x/day.     *this can replace the ab marches, as well as the straight leg raise.

## 2021-06-17 NOTE — PATIENT INSTRUCTIONS - HE
Patient Instructions by Horacio Baker PT at 12/9/2019  3:30 PM     Author: Horacio Baker PT Service: -- Author Type: Physical Therapist    Filed: 12/9/2019  4:01 PM Encounter Date: 12/9/2019 Status: Signed    : Horacio Baker PT (Physical Therapist)        BRACE MARCHING    While lying on your back with your knees bent,  slowly raise up one foot a few inches and then set it back down.  Next, perform on your other leg.  Use your stomach muscles to keep your low back pushed down flat the entire time.    Do 20x each foot. 2 sets.  1-2x/day.      STANDING MARCHING    While standing, draw up your knee, set it down and then alternate to your other side.    Use your arms for support if needed for balance and safety.     15x each leg, each exercise.  1x/day.    HIP ABDUCTION - STANDING     While standing, raise your leg out to the side. Keep your knee straight and maintain your toes pointed forward the entire time.      Use your arms for support if needed for balance and safety.     STANDING HAMSTRING CURLS    While standing, bend your knee so that your heel moves towards your buttock. Lower back down until first contact with floor and repeat.   Keep knees in-line with one another.      STANDING HEEL RAISES    While standing, raise up on your toes as you lift your heels off the ground,  Then go back on your heels and lift your toes off the ground.

## 2021-06-17 NOTE — PATIENT INSTRUCTIONS - HE
"Patient Instructions by Horacio Baker PT at 11/19/2019  3:30 PM     Author: Horacio Baker PT Service: -- Author Type: Physical Therapist    Filed: 11/19/2019  4:19 PM Encounter Date: 11/19/2019 Status: Signed    : Horacio Baker PT (Physical Therapist)        PELVIC TILT    While lying on your back, use your stomach muscles to press your spine downwards towards the ground. Do not move into a painful position.    Hold 3-5 seconds as able, making sure to breathe.  10x.    2x/day.      BRIDGING    While lying on your back, tighten your lower abdominals, squeeze your buttocks and then raise your buttocks off the floor/bed as creating a \"Bridge\" with your body.    Hold 2 seconds. 10x.  2x/day.      STRAIGHT LEG RAISE - SLR    While lying or sitting, raise up your leg with a straight knee.  Keep the opposite knee bent with the foot planted to the ground.    Do 5-10x each leg as tolerated.  2x/day.      CLAM SHELLS    While lying on your side with your knees bent, draw up the top knee while keeping contact of your feet together.    Do not let your pelvis roll back during the lifting movement.      10-15 reps each leg.  2x/day.      SCAPULAR RETRACTIONS    Draw your shoulder blades back and down.    Hold 3 seconds.  10x.    2x/day.      STANDING NBOS WITH EYES CLOSED    Stand with your feet together.  Close your eyes and visualize upright position.    Hold 60 seconds. 2x.    2x/day.                  "

## 2021-06-17 NOTE — PROGRESS NOTES
[facility] Follow-up Note    Assessment & Plan        1. Third Degree A-V Block -patient had pacemaker implanted in 1993, Medtronic device which she is utilizing 99% of the time in the atria and 1% of the time in the ventricle. She had a generator change out in 2008. Underlying rhythm is possibly sinus bradycardia versus junctional with heart rate around 30 bpm.    2. Syncopal episodes -secondary to above arrhythmia and resolved with implantation of pacemaker.   3. Hypertension -under good control currently.   4. Hypercholesteremia -cholesterol 165 with an LDL of 71 which is excellent from 2016.   5. Cardiac pacemaker, dual, in situ -this is a Medtronic Adapta device implanted in January 2008 with 50 months of battery life left.  99.8% atrial pacing with 0.2% ventricular pacing with no major arrhythmias.   6.  Obstructive sleep apnea-defer to primary.  7.  Left bundle branch block-stable.    Plan  1.  Continue current medications.  2.  Follow-up with me in 1 year or sooner if needed.    Subjective  CC: 62-year-old white female here for yearly follow-up today.  She is getting along well with any syncope, dizziness, chest discomfort, palpitations, shortness breath, PND, orthopnea or peripheral edema.  She is enjoying spring walking her little dog outside.    Medications  Current Outpatient Prescriptions   Medication Sig     aspirin 81 MG EC tablet Take 81 mg by mouth daily.     citalopram (CELEXA) 40 MG tablet Take 60 mg by mouth daily.     cycloSPORINE (RESTASIS) 0.05 % ophthalmic emulsion Administer 1 drop to both eyes 2 (two) times a day.     digoxin (LANOXIN) 250 mcg tablet Take 250 mcg by mouth every morning.     DILANTIN 30 mg ER capsule Take 2 capsules by mouth every morning.     gabapentin (NEURONTIN) 300 MG capsule Take 600 mg by mouth at bedtime.      hydrochlorothiazide (HYDRODIURIL) 25 MG tablet Take 25 mg by mouth daily.     levETIRAcetam (KEPPRA) 250 MG tablet Take 250 mg by mouth 2  "(two) times a day.     levothyroxine (SYNTHROID, LEVOTHROID) 125 MCG tablet Take 125 mcg by mouth Daily at 6:00 am.      peg 400-propylene glycol PF (SYSTANE, PF,) 0.4-0.3 % Dpet Apply 1 drop to eye 3 (three) times a day as needed.     phenytoin (DILANTIN) 100 MG ER capsule Take 100 mg by mouth every morning. Along with 2 - 30 mg capsules     phenytoin (DILANTIN) 100 MG ER capsule Take 200 mg by mouth bedtime.     potassium chloride (MICRO-K) 10 mEq CR capsule Take 10 mEq by mouth daily.     simvastatin (ZOCOR) 40 MG tablet Take 40 mg by mouth bedtime.       Objective  /76 (Patient Site: Right Arm, Patient Position: Sitting, Cuff Size: Adult Large)  Pulse 84  Resp 16  Ht 5' 6\" (1.676 m)  Wt 195 lb (88.5 kg)  BMI 31.47 kg/m2    General Appearance:    Alert, cooperative, no distress, appears stated age   Head:    Normocephalic, without obvious abnormality, atraumatic   Throat:   Lips, mucosa, and tongue normal; teeth and gums normal   Neck:   Supple, symmetrical, trachea midline, no adenopathy;        thyroid:  No enlargement/tenderness/nodules; no carotid    bruit or JVD   Back:     Symmetric, no curvature, ROM normal, no CVA tenderness   Lungs:     Clear to auscultation bilaterally, respirations unlabored   Chest wall:    No tenderness, left-sided pacemaker noted   Heart:    Regular rate and rhythm, S1 and S2 normal, no murmur, rub   or gallop   Abdomen:     Soft, non-tender, bowel sounds active all four quadrants,     no masses, no organomegaly   Extremities:   Normal, atraumatic, no cyanosis or edema   Pulses:   2+ and symmetric all extremities   Skin:   Skin color, texture, turgor normal, no rashes or lesions     Results    Lab Results personally reviewed   Lab Results   Component Value Date    CHOL 165 12/05/2016    CHOL 227 (H) 06/03/2015     Lab Results   Component Value Date    HDL 45 (L) 12/05/2016    HDL 52 06/03/2015     Lab Results   Component Value Date    LDLCALC 71 12/05/2016    LDLCALC 142 " (H) 06/03/2015     Lab Results   Component Value Date    TRIG 245 (H) 12/05/2016    TRIG 165 (H) 06/03/2015     Lab Results   Component Value Date    WBC 6.4 11/20/2017    HGB 13.3 11/20/2017    HCT 38.8 11/20/2017     11/20/2017     Lab Results   Component Value Date    CREATININE 0.79 11/20/2017    BUN 18 11/20/2017     11/20/2017    K 3.1 (L) 11/20/2017    CO2 28 11/20/2017     Review of Systems:   General: WNL  Eyes: WNL  Ears/Nose/Throat: WNL  Lungs: WNL  Heart: WNL  Stomach: WNL  Bladder: WNL  Muscle/Joints: WNL  Skin: WNL  Nervous System: Falls, Daytime Sleepiness  Mental Health: WNL     Blood: WNL

## 2021-06-17 NOTE — PROGRESS NOTES
In clinic device check with Device Nurse followed by office visit with Dr. Wick.  Please see link for full device report.  Patient was informed of results and next follow up during today's visit.

## 2021-06-18 NOTE — LETTER
Letter by Mily Campbell EPS at      Author: Mily Campbell EPS Service: -- Author Type: --    Filed:  Encounter Date: 3/6/2019 Status: (Other)       Chaya Bean  [address]  [place] [address]      March 7, 2019      Dear Ms. Bean,    RE: Remote Results    We are writing to you regarding your recent Remote Pacemaker check from home. Your transmission was received successfully. Battery status is satisfactory at this time.     Your results are within normal limits.    Your next device appointment will be a remote check on April 9, 2019.  You can choose the time of day you wish to transmit. Beginning monthly remote transmissions to monitor the battery, device is nearing replacement time.     To schedule or reschedule, please call [phone] and press 1.    NOTE: If you would like to do an extra transmission, please call [phone] and press 3 to speak to a nurse BEFORE transmitting. This ensures that the Device Clinic staff is aware of the reason you are sending a transmission, and can follow-up with you after it has been reviewed.    We will be checking your implanted device from home (remotely) every month unless otherwise instructed. We will need to see you in the clinic at least once a year. You may need to be seen in the clinic sooner depending on the results of your check.    Please be aware:    The follow-up schedule is like a Physician prescription.    Your remote monitor is paired to your specific implanted device.      Sincerely,    [facility]

## 2021-06-19 NOTE — LETTER
Letter by Graciela Branch EPS at      Author: Graciela Branch EPS Service: -- Author Type: --    Filed:  Encounter Date: 4/9/2019 Status: (Other)         Chaya Bean  [address]  [facility] [address]      April 10, 2019      Dear Ms. Bean,    RE: Remote Results    We are writing to you regarding your recent Remote Pacemaker check from home. Your transmission was received successfully. Battery status is satisfactory at this time.     Your results are within normal limits.    Your next device appointment will be a clinic visit.  Please call in now to schedule.      To schedule or reschedule, please call [phone] and press 1.    NOTE: If you would like to do an extra transmission, please call [phone] and press 3 to speak to a nurse BEFORE transmitting. This ensures that the Device Clinic staff is aware of the reason you are sending a transmission, and can follow-up with you after it has been reviewed.    We will be checking your implanted device from home (remotely) every month unless otherwise instructed. We will need to see you in the clinic at least once a year. You may need to be seen in the clinic sooner depending on the results of your check.    Please be aware:    The follow-up schedule is like a Physician prescription.    Your remote monitor is paired to your specific implanted device.      Sincerely,    [facility]

## 2021-06-19 NOTE — LETTER
Letter by Fani Zafar at      Author: Fani Zafar Service: -- Author Type: --    Filed:  Encounter Date: 5/13/2019 Status: (Other)         Chaya Bean  [address]  [place] [address]      May 14, 2019      Dear Ms. Bean,    RE: Remote Results    We are writing to you regarding your recent Remote Pacemaker check from home. Your transmission was received successfully. Battery status is satisfactory at this time.     Your results are within normal limits.    Your next device appointment will be a clinic visit on June 11, 2019 at 3:20pm at our  [place] location, [address].    To schedule or reschedule, please call [phone] and press 1.    NOTE: If you would like to do an extra transmission, please call [phone] and press 3 to speak to a nurse BEFORE transmitting. This ensures that the Device Clinic staff is aware of the reason you are sending a transmission, and can follow-up with you after it has been reviewed.    We will be checking your implanted device from home (remotely) every month unless otherwise instructed. We will need to see you in the clinic at least once a year. You may need to be seen in the clinic sooner depending on the results of your check.    Please be aware:    The follow-up schedule is like a Physician prescription.    Your remote monitor is paired to your specific implanted device.      Sincerely,    [facility]

## 2021-06-19 NOTE — LETTER
Letter by Ananya Bates RN at      Author: Ananya Bates RN Service: -- Author Type: --    Filed:  Encounter Date: 7/12/2019 Status: (Other)         Chaya Bean  [address]  [place] [address]                          Dear Chaya Bean,    Important Information for Your Procedure    You are having a Pacemaker Battery Change Procedure:    Your procedure is scheduled for Monday July 15, 2019    Please arrive at 8:00 am  for registration and preporation for your procedure.   Getting Ready for Your Procedure:    You will need to contact your primary doctor Jai Proctor MD ([phone]), and schedule a pre-operative history within 30 days of your procedure.    You will need to bring a current list of your medications with you for our admissions process the day of your procedure, please do not bring any of your own medications  with you to the hospital    The hospital cannot store your valuables, so please leave them at home    You will need to take off your jewelry prior to your procedure, we advise leaving your jewelry at home, as we cannot store your valuables    Please shower the morning of your procedure, or the night before    This is a same day procedure, in which you can expect to go home the same day. In any unforseen circumstanced this plan can change, but is unlikely.    Please make arrange for transportation home, as you will not be able to drive following your procedure  The Night Prior to Your Procedure:    Please do not have anything to eat or drink after Midnight (12:00am) prior to your procedure    It is important to stay well hydrated, and consume balanced meals the day prior to your procedure  Arriving for Your Procedure:    Please arrive at [facility], located at: [address] [address]      parking is available after 6:00am for your convenience, parking is also available in the parking ramp located off of [address] attached to the  hospital    If you park in the ramp located on [address], take the elevator to level one. Enter the doors to the atrium/lobby area and check in at the main reception desk.     Please check in at the main reception desk in the lobby    You will be assisted to Cardiac Special Care on the third floor.  Going Home:    The physician and/or nurse will review any restrictions, follow-up requirements, and medication instructions prior to going home from the hospital in detail    Listed below are the restrictions that you can expect after your procedure:  o You will not be able to shower for 3 days after your procedure, to reduce the risk for infection and disrupting your incision site.  o You will not be allowed to drive for 24 hours after your procedure.  Clinic Contact Information:    You can contact our main clinic line at any time with questions, concerns, or if you need further information: [facility] [phone]    If you have further questions in regards to the scheduling of you procedure, please contact [facility] at [phone]    Medication prior to your procedure:    Please take your morning medications the day of your procedure with sips of water, except any multivitamins or supplements.    If you have any questions regarding your medication prior to your procedure please contact me at the number listed below.        Sincerely,      Ananya Bates RN  Please call with any questions or concerns regarding the procedure at : [phone]                                                                            Information on Pacemakers    Your Hearts Electrical System  Electrical signals cause the heart to pump blood throughout the body.  The heart has a special system that creates and sends electrical signals.                               Why would a Pacemaker be needed? What does it do?  If your hearts electrical signals are too slow or your heart rate  does not increase with activity, you can feel lightheaded, short of breath, dizzy or off balance.  This places you at risk to faint or fall.  Also, if at any point along the electrical pathway, the signal is blocked or isnt always sent along the normal path, you could have the signs of a slow heart rate noted above. A pacemaker is a small electronic device that causes your heart to beat if it is too slow. Many pacemakers sense when you are walking or working and will increase your heart rate as your natural heart would. The Electrophysiologist (cardiologist trained in heart rhythms) will put your device in and decide which device is best for you.    Having a Pacemaker Implanted?  A pacemaker is the size of a small pocket watch and the battery will last anywhere from 7 to 11 years. The pacemaker will need to be changed when the battery is low.  The wire(s) are inserted through a big vein below your collarbone and will be directed through the veins into the hearts right upper and lower chambers.  The wires will often last a lifetime.  With the procedure, the risk of dying is rare.  The risk of a needle or wire puncturing a hole in the lung or heart is less than 1%.  There is less than 1% chance of infection.  We take extra steps to prevent infection as this would require the wires and pacemaker to be taken out.  There is a small risk the wires in your heart will move.  This risk is greatest in the first day, so you will have some arm movement restrictions after the procedure to try to prevent this from occurring.  The risk of a wire moving is less than 4%.  It is common to have some bruising, minor bleeding or swelling in the area of the pacemaker.  You will be told how to care for your incision at the time of the procedure.    What to expect with a Pacemaker    After a pacemaker, you can expect to live a normal life!    You can use all normal household appliances.      You should not keep a cell phone in a pocket  over your pacemaker, but you can use a cell phone on either ear as long as you keep it 6 inches from your pacemaker.    You should avoid very strong magnets and arc welders.  You should not stand still inside the security bars (scanners) at a store, but you can walk through them without problems.     You can fly on an airplane, but you will need to tell security that you have a pacemaker and be prepared to show a card that you will get at the time of your pacemaker procedure.    You will not be able to have an MRI unless you get a special MRI compatible pacemaker and wires.    Preparing for your Procedure    You will not be allowed to eat or drink 8 hours before your procedure. You will be given further instructions by your provider or a nurse regarding the medication you will take the morning of your procedure.    You will need to arrange to have a  take you home from the hospital; you will not be permitted to drive for 3 days after your procedure.    You are allowed to bring personal items and clothing to the hospital, but do not bring any valuables. You will need to bring a list of the names and dosages of the medication you are taking to the hospital.    It is important to mention to your provider or nurse if you have any allergies, reactions to anesthesia, or have a history of bleeding problems.      During the Procedure  Your procedure typically takes around 1 hour to implant.    The procedural area: You will be transferred back to the procedure room once you have been appropriately prepped by the nursing staff and you are ready for your device implant.    Sedation: You will get medication to help you relax and to keep you comfortable throughout the procedure, during this time you will be awake enough to breathe on your own. You will be able to communicate with the doctor and nurse throughout the procedure, and are encouraged to tell them if you are uncomfortable and/or need more medication at any  time.    Insertion of your device: a local anesthetic is given by injection to numb the skin in the area where the device will be inserted. You may feel pressure at the insertion site during the procedure, but this medication is used to make sure you do not feel pain. A small 3-4 inch incision is made to create a small pocket where your device will be placed, and this will also be used to access a large vein where the device leads will be inserted.    Finishing up: Your doctor will connect the generator to the leads, insure proper function, and suture the incision closed.     After the Procedure    You will be transferred from the procedural area to a private room for recovery. Most patients after this type of procedure will be allowed to go home the same day. If needed, the doctor may require you to spend the night in the hospital overnight.    Prior to going home your device settings will be rechecked and you will have a chest x-ray, which will be reviewed by the doctor prior to you being discharged from the hospital.     Follow the restrictions that you are given after your pacemaker implant. You will likely be told not to raise your arm above your shoulder on the side the pacemaker was inserted for several weeks after the procedure. You need to avoid sports and activities where you lift weights or use that arm to push or throw something during that time period.      Follow the instructions you are given for caring for the incision site. Check your incision for signs of infection every day for a week. It is normal to have some swelling over the pacemaker especially if you are on a blood thinner.  You can put a cloth over the pacemaker and ice in a bag to help decrease the swelling for up to 20 minutes 4 times a day.      Please arrange for a  from the hospital, as you will not be able to resume driving until 3 days after your implant.    You will be scheduled to return for a follow-up visit in the Device  Clinic in about a week. You will be given this appointment before you leave the hospital.    When you are in the hospital, you will get a list of signs and symptoms to watch for and when to contact the heart clinic.

## 2021-06-19 NOTE — LETTER
Letter by Karlie Amaya RDCS at      Author: Karlie Amaya RDCS Service: -- Author Type: --    Filed:  Encounter Date: 10/15/2019 Status: Signed         Chaya Bean  [address]  [place] [address]      October 15, 2019      Dear Ms. Bean,    RE: Remote Results    We are writing to you regarding your recent Remote Pacemaker check from home. Your transmission was received successfully. Battery status is satisfactory at this time.     Your results are showing no significant changes.    Your next device appointment will be a remote check on January 16, 2020; this will occur automatically.    To schedule or reschedule, please call [phone] and press 1.    NOTE: If you would like to do an extra transmission, please call [phone] and press 3 to speak to a nurse BEFORE transmitting. This ensures that the Device Clinic staff is aware of the reason you are sending a transmission, and can follow-up with you after it has been reviewed.    We will be checking your implanted device from home (remotely) every three months unless otherwise instructed. We will need to see you in the clinic at least once a year. You may need to be seen in the clinic sooner depending on the results of your check.    Please be aware:    The follow-up schedule is like a Physician prescription.    Your remote monitor is paired to your specific implanted device.      Sincerely,    [facility]

## 2021-06-20 NOTE — LETTER
Letter by Mily Campbell EPS at      Author: Mily Campbell EPS Service: -- Author Type: --    Filed:  Encounter Date: 1/16/2020 Status: Signed         Chaya Bean  [address] [place] [address]      January 16, 2020      Dear Ms. Bean,    RE: Remote Results    We are writing to you regarding your recent Remote Pacemaker check from home. Your transmission was received successfully. Battery status is satisfactory at this time.     Your results are within normal limits.    Your next device appointment will be a remote check on April 16, 2020; this will occur automatically.    To schedule or reschedule, please call [phone] and press 1.    NOTE: If you would like to do an extra transmission, please call [phone] and press 3 to speak to a nurse BEFORE transmitting. This ensures that the Device Clinic staff is aware of the reason you are sending a transmission, and can follow-up with you after it has been reviewed.    We will be checking your implanted device from home (remotely) every three months unless otherwise instructed. We will need to see you in the clinic at least once a year. You may need to be seen in the clinic sooner depending on the results of your check.    Please be aware:    The follow-up schedule is like a Physician prescription.    Your remote monitor is paired to your specific implanted device.      Sincerely,    [facility]

## 2021-06-20 NOTE — LETTER
Letter by Ara Dias RN at      Author: Ara Dias RN Service: -- Author Type: --    Filed:  Encounter Date: 7/15/2020 Status: (Other)         Chaya Bean  [address]  [place] [address]      July 16, 2020      Dear Ms. Bean,    RE: Remote Results    We are writing to you regarding your recent Remote Pacemaker check from home. Your transmission was received successfully. Battery status is satisfactory at this time.     Your results are within normal limits.    Your next device appointment will be a remote check on 10/19/2020; this will occur automatically.    To schedule or reschedule, please call [phone] and press 1.    NOTE: If you would like to do an extra transmission, please call [phone] and press 3 to speak to a nurse BEFORE transmitting. This ensures that the Device Clinic staff is aware of the reason you are sending a transmission, and can follow-up with you after it has been reviewed.    We will be checking your implanted device from home (remotely) every three months unless otherwise instructed. We will need to see you in the clinic at least once a year. You may need to be seen in the clinic sooner depending on the results of your check.    Please be aware:    The follow-up schedule is like a Physician prescription.    Your remote monitor is paired to your specific implanted device.      Sincerely,    [facility]

## 2021-06-20 NOTE — LETTER
Letter by Marisela Lua RN at      Author: Marisela Lua RN Service: -- Author Type: --    Filed:  Encounter Date: 4/16/2020 Status: (Other)         Chaya Bean  [address]  [place] [address]      April 16, 2020      Dear Ms. Bean,    RE: Remote Results    We are writing to you regarding your recent Remote Pacemaker check from home. Your transmission was received successfully. Battery status is satisfactory at this time.     Your results are showing no significant changes.    Your next device appointment will be a clinic visit.  Please call in the end of May, 2020 to schedule.      To schedule or reschedule, please call [phone] and press 1.    NOTE: If you would like to do an extra transmission, please call [phone] and press 3 to speak to a nurse BEFORE transmitting. This ensures that the Device Clinic staff is aware of the reason you are sending a transmission, and can follow-up with you after it has been reviewed.    We will be checking your implanted device from home (remotely) every three months unless otherwise instructed. We will need to see you in the clinic at least once a year. You may need to be seen in the clinic sooner depending on the results of your check.    Please be aware:    The follow-up schedule is like a Physician prescription.    Your remote monitor is paired to your specific implanted device.      Sincerely,    [facility]

## 2021-06-21 NOTE — LETTER
Letter by Fani Zafar at      Author: Fani Zafar Service: -- Author Type: --    Filed:  Encounter Date: 10/19/2020 Status: (Other)         Chaya Bean  [address] [place] [address]      October 19, 2020      Dear Ms. Bean,    RE: Remote Results    We are writing to you regarding your recent Remote Pacemaker check from home. Your transmission was received successfully. Battery status is satisfactory at this time.     Your results are within normal limits.    Your next device appointment will be a remote check on January 18, 2021; this will occur automatically.    To schedule or reschedule, please call [phone] and press 1.    NOTE: If you would like to do an extra transmission, please call [phone] and press 3 to speak to a nurse BEFORE transmitting. This ensures that the Device Clinic staff is aware of the reason you are sending a transmission, and can follow-up with you after it has been reviewed.    We will be checking your implanted device from home (remotely) every three months unless otherwise instructed. We will need to see you in the clinic at least once a year. You may need to be seen in the clinic sooner depending on the results of your check.    Please be aware:    The follow-up schedule is like a Physician prescription.    Your remote monitor is paired to your specific implanted device.      Sincerely,    [facility]

## 2021-06-21 NOTE — LETTER
Letter by Karlie Amaya RDCS at      Author: Karlie Amaya RDCS Service: -- Author Type: --    Filed:  Encounter Date: 4/19/2021 Status: (Other)         Chaya Bean  [address]  [place] [address]      April 19, 2021      Dear Ms. Bean,    RE: Remote Results    We are writing to you regarding your recent Remote Pacemaker check from home. Your transmission was received successfully. Battery status is satisfactory at this time.     Your results are showing no significant changes.    Your next device appointment will be a clinic visit.  Please call in late May to schedule.      To schedule or reschedule, please call [phone] and press 1.    NOTE: If you would like to do an extra transmission, please call [phone] and press 3 to speak to a nurse BEFORE transmitting. This ensures that the Device Clinic staff is aware of the reason you are sending a transmission, and can follow-up with you after it has been reviewed.    We will be checking your implanted device from home (remotely) every three months unless otherwise instructed. We will need to see you in the clinic at least once a year. You may need to be seen in the clinic sooner depending on the results of your check.    Please be aware:    The follow-up schedule is like a Physician prescription.    Your remote monitor is paired to your specific implanted device.      Sincerely,    [facility]

## 2021-06-21 NOTE — LETTER
Letter by Giacomo Forte RN at      Author: Giacomo Forte RN Service: -- Author Type: --    Filed:  Encounter Date: 1/18/2021 Status: (Other)         Chaya Bean  [address] [place] [address]      January 18, 2021      Dear Ms. Bean,    RE: Remote Results    We are writing to you regarding your recent Remote Pacemaker check from home. Your transmission was received successfully. Battery status is satisfactory at this time.     Your results are showing no significant changes.    Your next device appointment will be a remote check on 4/19/2021; this will occur automatically.    To schedule or reschedule, please call [phone] and press 1.    NOTE: If you would like to do an extra transmission, please call [phone] and press 3 to speak to a nurse BEFORE transmitting. This ensures that the Device Clinic staff is aware of the reason you are sending a transmission, and can follow-up with you after it has been reviewed.    We will be checking your implanted device from home (remotely) every three months unless otherwise instructed. We will need to see you in the clinic at least once a year. You may need to be seen in the clinic sooner depending on the results of your check.    Please be aware:    The follow-up schedule is like a Physician prescription.    Your remote monitor is paired to your specific implanted device.      Sincerely,    [facility]

## 2021-06-29 NOTE — PROGRESS NOTES
"Progress Notes by Kinjal Wick MD at 7/10/2020 10:50 AM     Author: Kinjal Wick MD Service: -- Author Type: Physician    Filed: 7/10/2020 11:26 AM Encounter Date: 7/10/2020 Status: Signed    : Kinjal Wick MD (Physician)           The patient has been notified of following:     \"This video visit will be conducted via a call between you and your physician/provider. We have found that certain health care needs can be provided without the need for an in-person physical exam.  This service lets us provide the care you need with a video conversation.  If a prescription is necessary we can send it directly to your pharmacy.  If lab work is needed we can place an order for that and you can then stop by our lab to have the test done at a later time.      Patient has given verbal consent to a Video visit? Yes    HEART CARE VIDEO ENCOUNTER        The patient has chosen to have the visit conducted as a video visit, to reduce risk of exposure given the current status of Coronavirus in our community. This video visit is being conducted via a call between the patient and physician/provider. Health care needs are being provided without a physical exam.     Assessment/Recommendations   Assessment/Plan:  1. Cardiac pacemaker, dual, in situ -patient had pacemaker implanted in 1993, Medtronic device which she is utilizing 99% of the time in the atria and 1% of the time in the ventricle. She had a generator change out in 2008 and most recently July 2019. Underlying rhythm is possibly sinus bradycardia versus junctional with heart rate around 30 bpm.  Most recent check shows about 100% right atrial pacing with 2% right ventricular pacing and only 22 seconds of mode switch.   2. Benign essential hypertension -on HCTZ with blood pressure 103/67, would recommend backing off on this to a half a tablet a day during very hot humid days.   3. Obstructive Sleep Apnea -uses CPAP.   4. Hypercholesteremia -LDL April 2019 " 112 acceptable.   5. Left bundle branch block (LBBB) -so noted and chronic.       Plan  1.  Try only one half HCTZ a day and has been with days.  Follow Up Plan: 12 months  I have reviewed the note as documented.  This accurately captures the substance of my conversation with the patient.    Total time of video between patient and provider was 11 minutes   Start Time: 1111  Stop Time: 1122    Originating Location (pt. Location): Home    Distant Location (provider location):  [facility] C[facility]     Mode of Communication:  Video Conference via [email or web address]       History of Present Illness/Subjective    Chaya Bean is a 64 y.o. female who is being evaluated via a billable video visit and has consented to a video visit. Chaya Bean has a history of sick sinus syndrome and has Medtronic pacemaker in place with generator change out 2 separate times most recently July 2019.  Getting along well although on a very hot humid day feels her heart working harder.  No headaches, syncope, dizziness, chest discomfort, palpitations, shortness of breath, PND, with apnea or profound peripheral edema.      I have reviewed and updated the patient's Past Medical History, Social History, Family History and Medication List.     Physical Examination performed via live video encounter Review of Systems   General Appearance:   no distress, normal body habitus, upright.   ENT/Mouth: membranes moist, no nasal discharge or bleeding gums.  Normal head shape, no evidence of injury or laceration.     EYES:  no scleral icterus, normal conjunctivae   Neck: no evidence of thyromegaly.  Supple   Chest/Lungs:   No audible wheezing equal chest wall expansion. Non labored breathing.  No cough.   Cardiovascular:   No evidence of elevated jugular venous pressure.  No evidence of pitting edema bilaterally    Abdomen:  no evidence of abdominal distention. No observe juandice.     Extremities: no cyanosis or clubbing noted.    Skin: no xanthelasma, normal skin  collar. No evidence of facial lacerations.      Neurologic: Normal arm motion bilateral, no tremors.  No evidence of focal defect.       Psychiatric: alert and oriented x3, calm     Review of Systems - Cardiovascular ROS: positive for - edema in ankles   ALL OTHERS WNL     NO OTHER CONCERNS     VIDEO VISIT                                         Medical History  Surgical History Family History Social History   Past Medical History:   Diagnosis Date   ? Dyslipidemia    ? Hypertension    ? Hypothyroid    ? Pacemaker    ? Post herpetic neuralgia    ? Sick sinus syndrome (H)    ? Sleep apnea    ? Syncope    ? Third degree AV block (H)     Past Surgical History:   Procedure Laterality Date   ? ABDOMINAL SURGERY     ? BACK SURGERY     ? CARDIAC PACEMAKER PLACEMENT     ? OOPHORECTOMY Left 12 yrs ago    Family History   Problem Relation Age of Onset   ? Diabetes Mother    ? Heart disease Mother    ? Hypertension Mother    ? Stroke Mother    ? BRCA 1/2 Neg Hx    ? Breast cancer Neg Hx    ? Cancer Neg Hx    ? Colon cancer Neg Hx    ? Endometrial cancer Neg Hx    ? Ovarian cancer Neg Hx       Social History     Socioeconomic History   ? Marital status:      Spouse name: Not on file   ? Number of children: Not on file   ? Years of education: Not on file   ? Highest education level: Not on file   Occupational History   ? Not on file   Social Needs   ? Financial resource strain: Not on file   ? Food insecurity     Worry: Not on file     Inability: Not on file   ? Transportation needs     Medical: Not on file     Non-medical: Not on file   Tobacco Use   ? Smoking status: Never Smoker   ? Smokeless tobacco: Never Used   Substance and Sexual Activity   ? Alcohol use: No   ? Drug use: No   ? Sexual activity: Not on file   Lifestyle   ? Physical activity     Days per week: Not on file     Minutes per session: Not on file   ? Stress: Not on file   Relationships   ? Social connections     Talks on phone: Not on file     Gets  together: Not on file     Attends Yazdanism service: Not on file     Active member of club or organization: Not on file     Attends meetings of clubs or organizations: Not on file     Relationship status: Not on file   ? Intimate partner violence     Fear of current or ex partner: Not on file     Emotionally abused: Not on file     Physically abused: Not on file     Forced sexual activity: Not on file   Other Topics Concern   ? Not on file   Social History Narrative   ? Not on file          Medications  Allergies   Current Outpatient Medications   Medication Sig Dispense Refill   ? aspirin 81 MG EC tablet Take 81 mg by mouth daily.     ? citalopram (CELEXA) 40 MG tablet Take 60 mg by mouth daily.     ? cycloSPORINE (RESTASIS) 0.05 % ophthalmic emulsion Administer 1 drop to both eyes daily as needed.            ? digoxin (LANOXIN) 250 mcg tablet Take 250 mcg by mouth every morning.     ? gabapentin (NEURONTIN) 300 MG capsule Take 2 capsules daily at bedtime. 60 capsule 2   ? hydrochlorothiazide (HYDRODIURIL) 25 MG tablet Take 25 mg by mouth daily.     ? levETIRAcetam (KEPPRA) 250 MG tablet Take 500 mg by mouth 2 (two) times a day.            ? levothyroxine (SYNTHROID, LEVOTHROID) 125 MCG tablet Take 125 mcg by mouth Daily at 6:00 am.      ? phenytoin (DILANTIN) 100 MG ER capsule Take 100 mg by mouth every morning.            ? phenytoin (DILANTIN) 100 MG ER capsule Take 200 mg by mouth bedtime.     ? potassium chloride (MICRO-K) 10 mEq CR capsule Take 10 mEq by mouth 2 (two) times a day.              No current facility-administered medications for this visit.     Allergies   Allergen Reactions   ? Amoxicillin Diarrhea   ? Adhesive Rash         Lab Results    Chemistry/lipid CBC Cardiac Enzymes/BNP/TSH/INR   Lab Results   Component Value Date    CHOL 183 04/29/2019    HDL 44 (L) 04/29/2019    LDLCALC 112 04/29/2019    TRIG 133 04/29/2019    CREATININE 0.81 07/15/2019    BUN 18 07/15/2019    K 3.8 07/15/2019    NA  140 07/15/2019     07/15/2019    CO2 27 07/15/2019    Lab Results   Component Value Date    WBC 5.6 07/15/2019    HGB 13.3 07/15/2019    HCT 40.0 07/15/2019    MCV 94 07/15/2019     07/15/2019    Lab Results   Component Value Date    TROPONINI 0.01 03/01/2015    TSH 0.02 (L) 07/12/2019    INR 0.97 03/26/2015        Kinjal Wick

## 2021-07-04 ENCOUNTER — HEALTH MAINTENANCE LETTER (OUTPATIENT)
Age: 66
End: 2021-07-04

## 2021-07-13 ENCOUNTER — RECORDS - HEALTHEAST (OUTPATIENT)
Dept: ADMINISTRATIVE | Facility: CLINIC | Age: 66
End: 2021-07-13

## 2021-07-21 ENCOUNTER — RECORDS - HEALTHEAST (OUTPATIENT)
Dept: ADMINISTRATIVE | Facility: CLINIC | Age: 66
End: 2021-07-21

## 2021-08-11 ENCOUNTER — ANCILLARY PROCEDURE (OUTPATIENT)
Dept: MAMMOGRAPHY | Facility: CLINIC | Age: 66
End: 2021-08-11
Attending: FAMILY MEDICINE
Payer: MEDICARE

## 2021-08-11 DIAGNOSIS — Z12.31 VISIT FOR SCREENING MAMMOGRAM: ICD-10-CM

## 2021-08-11 PROCEDURE — 77067 SCR MAMMO BI INCL CAD: CPT

## 2021-09-01 DIAGNOSIS — G40.009 PARTIAL IDIOPATHIC EPILEPSY WITH SEIZURES OF LOCALIZED ONSET, NOT INTRACTABLE, WITHOUT STATUS EPILEPTICUS (H): Primary | ICD-10-CM

## 2021-09-01 DIAGNOSIS — G40.209 COMPLEX PARTIAL EPILEPTIC SEIZURE (H): ICD-10-CM

## 2021-09-01 RX ORDER — PHENYTOIN SODIUM 100 MG/1
CAPSULE, EXTENDED RELEASE ORAL
Qty: 90 CAPSULE | Refills: 0 | Status: SHIPPED | OUTPATIENT
Start: 2021-09-01 | End: 2021-10-14

## 2021-09-01 RX ORDER — LEVETIRACETAM 250 MG/1
500 TABLET ORAL 2 TIMES DAILY
Qty: 120 TABLET | Refills: 0 | Status: SHIPPED | OUTPATIENT
Start: 2021-09-01 | End: 2021-10-07

## 2021-09-01 NOTE — TELEPHONE ENCOUNTER
Refill request for phenytoin and levetiracetam  Last seen 5/29/19.  No future appt scheduled.  Letter mailed to pt to remind to schedule.  Medication T'd for review and signature    Carolyn Montoya LPN on 9/1/2021 at 8:51 AM

## 2021-09-01 NOTE — LETTER
9/1/2021        RE: Chaya Bean  [address]  [place] [facility] [address]                Dear Chaya,          We recently provided you with medication refills.  Many medications require routine follow-up with your doctor.    Your prescription(s) have been refilled for 30 days so you may have time for the above noted follow-up. Please call to schedule soon so we can assure you have an appointment before your next refills are needed. If you have already made a follow up appointment, please disregard this letter.           Sincerely,        [facility]     (Formerly known as [facility])  Dr. Chilel Care Team

## 2021-09-27 ENCOUNTER — OFFICE VISIT (OUTPATIENT)
Dept: CARDIOLOGY | Facility: CLINIC | Age: 66
End: 2021-09-27
Attending: INTERNAL MEDICINE
Payer: MEDICARE

## 2021-09-27 VITALS
HEART RATE: 88 BPM | BODY MASS INDEX: 30.61 KG/M2 | HEIGHT: 67 IN | SYSTOLIC BLOOD PRESSURE: 100 MMHG | WEIGHT: 195 LBS | RESPIRATION RATE: 16 BRPM | DIASTOLIC BLOOD PRESSURE: 64 MMHG

## 2021-09-27 DIAGNOSIS — I10 BENIGN ESSENTIAL HYPERTENSION: ICD-10-CM

## 2021-09-27 DIAGNOSIS — K52.9 COLITIS: ICD-10-CM

## 2021-09-27 DIAGNOSIS — I49.5 SSS (SICK SINUS SYNDROME) (H): Primary | ICD-10-CM

## 2021-09-27 DIAGNOSIS — Z95.0 PACEMAKER: Primary | ICD-10-CM

## 2021-09-27 DIAGNOSIS — Z95.0 CARDIAC PACEMAKER IN SITU: ICD-10-CM

## 2021-09-27 DIAGNOSIS — G47.33 OBSTRUCTIVE SLEEP APNEA (ADULT) (PEDIATRIC): ICD-10-CM

## 2021-09-27 DIAGNOSIS — E78.00 HYPERCHOLESTEREMIA: ICD-10-CM

## 2021-09-27 DIAGNOSIS — I44.7 LEFT BUNDLE BRANCH BLOCK (LBBB): ICD-10-CM

## 2021-09-27 PROCEDURE — 93280 PM DEVICE PROGR EVAL DUAL: CPT | Performed by: INTERNAL MEDICINE

## 2021-09-27 PROCEDURE — 99214 OFFICE O/P EST MOD 30 MIN: CPT | Performed by: INTERNAL MEDICINE

## 2021-09-27 ASSESSMENT — MIFFLIN-ST. JEOR: SCORE: 1444.2

## 2021-09-27 NOTE — PATIENT INSTRUCTIONS
Ms Larkin GORDON Love,  I enjoyed visiting with you again today.  I am glad to hear you are doing well.  Per our conversation when the blood pressures are low you might want to back off on the hydrochlorothiazide and potassium.  Your next pacemaker check will be on 12/27/2021. This will occur automatically.   I will plan on seeing you 1 year or sooner if needed.  Jacob Wick

## 2021-09-27 NOTE — PROGRESS NOTES
In clinic device check with Dr. Wick.  Please see link for full device report.  Patient was informed of results and next follow up during today's visit.

## 2021-09-27 NOTE — LETTER
9/27/2021    Jai Proctor MD  [facility] [address]  [place] [address]    RE: Chaya Bean       Dear Colleague,    I had the pleasure of seeing Chaya Bean in the [facility].        [facility]  Heart Care Clinic Follow-up Note    Assessment & Plan        (I49.5) SSS (sick sinus syndrome) (H)  (primary encounter diagnosis)  Comment: This prompted implantation of pacemaker.    (Z95.0) Cardiac pacemaker, dual, in situ  Comment: Initial pacemaker implant in 1993, Medtronic device but currently she is using 2.8% of the time the atria and 99% of the time the ventricles.  She had a generator change out in 2008 as well as most recently in July 2019.  Does have less than 1% atrial fibrillation burden with 16 episodes with the longest being 16 minutes.  Did discuss if this comes to greater than 46 hours will need anticoagulation.    (I44.7) Left bundle branch block (LBBB)  Comment: So noted chronic and not an issue.    (I10) Benign essential hypertension  Comment: On HCTZ and potassium and borderline low blood pressure but yet asymptomatic.  I have asked her to continue to monitor this and potentially hold meds.    (E78.00) Hypercholesteremia  Comment:  which is acceptable.    (K52.9) Colitis  Comment: Chronic constipation and defer to primary.  Discussed with her the use of probiotics, yogurt and prune juice.    (G47.33) Obstructive Sleep Apnea  Comment: CPAP nightly.    Plan  1.  Continue current meds.  2.  Follow-up me 1 year or sooner if needed.    Subjective  CC: 66-year-old white female here for yearly follow-up today.  She tells me when the weather was really hot a few years ago and her conditioner did not work she was lightheaded and her  actually blacked out for dehydration.  Nothing like that has happened recently and she is still living independently but being careful with COVID-19.Patient  Ellie Davison is a 60 y.o. female presenting for Knee Pain (right)  . 1. Have you been to the ER, urgent care clinic since your last visit? Hospitalized since your last visit? No    2. Have you seen or consulted any other health care providers outside of the [address] [place] [address] since your last visit? Include any pap smears or colon screening. No    No flowsheet data found. Abuse Screening Questionnaire 9/12/2017   Do you ever feel afraid of your partner? N   Are you in a relationship with someone who physically or mentally threatens you? N   Is it safe for you to go home? Y       No flowsheet data found. There are no discontinued medications. "complains of no syncope, dizziness, fatigue, fevers, chest pain, palpitations, shortness of breath, PND, orthopnea, nausea, vomiting, or edema.    Medications  Current Outpatient Medications   Medication Sig Dispense Refill     aspirin 81 MG EC tablet [ASPIRIN 81 MG EC TABLET] Take 81 mg by mouth daily.       citalopram (CELEXA) 40 MG tablet [CITALOPRAM (CELEXA) 40 MG TABLET] Take 60 mg by mouth daily.       cycloSPORINE (RESTASIS) 0.05 % ophthalmic emulsion [CYCLOSPORINE (RESTASIS) 0.05 % OPHTHALMIC EMULSION] Administer 1 drop to both eyes daily as needed.              digoxin (LANOXIN) 250 mcg tablet [DIGOXIN (LANOXIN) 250 MCG TABLET] Take 250 mcg by mouth every morning.       hydrochlorothiazide (HYDRODIURIL) 25 MG tablet [HYDROCHLOROTHIAZIDE (HYDRODIURIL) 25 MG TABLET] Take 25 mg by mouth daily.       levETIRAcetam (KEPPRA) 250 MG tablet Take 2 tablets (500 mg) by mouth 2 times daily DUE FOR APPT WITH DR. RENEE FOR FURTHER REFILLS (Patient taking differently: Take 500 mg by mouth At Bedtime DUE FOR APPT WITH DR. RENEE FOR FURTHER REFILLS) 120 tablet 0     levothyroxine (SYNTHROID, LEVOTHROID) 125 MCG tablet [LEVOTHYROXINE (SYNTHROID, LEVOTHROID) 125 MCG TABLET] Take 125 mcg by mouth Daily at 6:00 am.        phenytoin (DILANTIN) 100 MG capsule Take 1 capsule in the morning and 2 capsules every night DUE FOR APPT WITH DR. RENEE FOR FURTHER REFILLS 90 capsule 0     potassium chloride (MICRO-K) 10 mEq CR capsule [POTASSIUM CHLORIDE (MICRO-K) 10 MEQ CR CAPSULE] Take 10 mEq by mouth 2 (two) times a day.                Objective  /64 (BP Location: Left arm, Patient Position: Sitting, Cuff Size: Adult Large)   Pulse 88   Resp 16   Ht 1.689 m (5' 6.5\")   Wt 88.5 kg (195 lb)   BMI 31.00 kg/m      General Appearance:    Alert, cooperative, no distress, appears stated age   Head:    Normocephalic, without obvious abnormality, atraumatic   Throat:   Lips, mucosa, and tongue normal; teeth and gums normal "   Neck:   Supple, symmetrical, trachea midline, no adenopathy;        thyroid:  No enlargement/tenderness/nodules; no carotid    bruit or JVD   Back:     Symmetric, no curvature, ROM normal, no CVA tenderness   Lungs:     Clear to auscultation bilaterally, respirations unlabored   Chest wall:    No tenderness or deformity   Heart:    Regular rate and rhythm, S1 and S2 normal, no murmur, rub   or gallop   Abdomen:     Soft, non-tender, bowel sounds active all four quadrants,     no masses, no organomegaly   Extremities:   Normal, atraumatic, no cyanosis or edema   Pulses:   2+ and symmetric all extremities   Skin:   Skin color, texture, turgor normal, no rashes or lesions     Results    Lab Results personally reviewed   Lab Results   Component Value Date    CHOL 259 (H) 12/14/2020    CHOL 183 04/29/2019     Lab Results   Component Value Date    HDL 57 12/14/2020    HDL 44 (L) 04/29/2019     No components found for: LDLCALC  Lab Results   Component Value Date    TRIG 243 (H) 12/14/2020    TRIG 133 04/29/2019     Lab Results   Component Value Date    WBC 5.1 12/14/2020    HGB 13.6 12/14/2020    HCT 40.4 12/14/2020     12/14/2020     Lab Results   Component Value Date    BUN 17 12/14/2020     12/14/2020    CO2 29 12/14/2020                 Thank you for allowing me to participate in the care of your patient.      Sincerely,     BARBRA WEST MD     [facility] Care  cc:   Rod Chadwick MD  [address] [place] [address]

## 2021-09-27 NOTE — PROGRESS NOTES
[facility]  Heart Care Clinic Follow-up Note    Assessment & Plan        (I49.5) SSS (sick sinus syndrome) (H)  (primary encounter diagnosis)  Comment: This prompted implantation of pacemaker.    (Z95.0) Cardiac pacemaker, dual, in situ  Comment: Initial pacemaker implant in 1993, Medtronic device but currently she is using 2.8% of the time the atria and 99% of the time the ventricles.  She had a generator change out in 2008 as well as most recently in July 2019.  Does have less than 1% atrial fibrillation burden with 16 episodes with the longest being 16 minutes.  Did discuss if this comes to greater than 46 hours will need anticoagulation.    (I44.7) Left bundle branch block (LBBB)  Comment: So noted chronic and not an issue.    (I10) Benign essential hypertension  Comment: On HCTZ and potassium and borderline low blood pressure but yet asymptomatic.  I have asked her to continue to monitor this and potentially hold meds.    (E78.00) Hypercholesteremia  Comment:  which is acceptable.    (K52.9) Colitis  Comment: Chronic constipation and defer to primary.  Discussed with her the use of probiotics, yogurt and prune juice.    (G47.33) Obstructive Sleep Apnea  Comment: CPAP nightly.    Plan  1.  Continue current meds.  2.  Follow-up me 1 year or sooner if needed.    Subjective  CC: 66-year-old white female here for yearly follow-up today.  She tells me when the weather was really hot a few years ago and her conditioner did not work she was lightheaded and her  actually blacked out for dehydration.  Nothing like that has happened recently and she is still living independently but being careful with COVID-19.Patient complains of no syncope, dizziness, fatigue, fevers, chest pain, palpitations, shortness of breath, PND, orthopnea, nausea, vomiting, or edema.    Medications  Current Outpatient Medications   Medication Sig Dispense Refill     aspirin 81 MG EC tablet [ASPIRIN 81 MG EC TABLET] Take  "81 mg by mouth daily.       citalopram (CELEXA) 40 MG tablet [CITALOPRAM (CELEXA) 40 MG TABLET] Take 60 mg by mouth daily.       cycloSPORINE (RESTASIS) 0.05 % ophthalmic emulsion [CYCLOSPORINE (RESTASIS) 0.05 % OPHTHALMIC EMULSION] Administer 1 drop to both eyes daily as needed.              digoxin (LANOXIN) 250 mcg tablet [DIGOXIN (LANOXIN) 250 MCG TABLET] Take 250 mcg by mouth every morning.       hydrochlorothiazide (HYDRODIURIL) 25 MG tablet [HYDROCHLOROTHIAZIDE (HYDRODIURIL) 25 MG TABLET] Take 25 mg by mouth daily.       levETIRAcetam (KEPPRA) 250 MG tablet Take 2 tablets (500 mg) by mouth 2 times daily DUE FOR APPT WITH DR. RENEE FOR FURTHER REFILLS (Patient taking differently: Take 500 mg by mouth At Bedtime DUE FOR APPT WITH DR. RENEE FOR FURTHER REFILLS) 120 tablet 0     levothyroxine (SYNTHROID, LEVOTHROID) 125 MCG tablet [LEVOTHYROXINE (SYNTHROID, LEVOTHROID) 125 MCG TABLET] Take 125 mcg by mouth Daily at 6:00 am.        phenytoin (DILANTIN) 100 MG capsule Take 1 capsule in the morning and 2 capsules every night DUE FOR APPT WITH DR. RENEE FOR FURTHER REFILLS 90 capsule 0     potassium chloride (MICRO-K) 10 mEq CR capsule [POTASSIUM CHLORIDE (MICRO-K) 10 MEQ CR CAPSULE] Take 10 mEq by mouth 2 (two) times a day.                Objective  /64 (BP Location: Left arm, Patient Position: Sitting, Cuff Size: Adult Large)   Pulse 88   Resp 16   Ht 1.689 m (5' 6.5\")   Wt 88.5 kg (195 lb)   BMI 31.00 kg/m      General Appearance:    Alert, cooperative, no distress, appears stated age   Head:    Normocephalic, without obvious abnormality, atraumatic   Throat:   Lips, mucosa, and tongue normal; teeth and gums normal   Neck:   Supple, symmetrical, trachea midline, no adenopathy;        thyroid:  No enlargement/tenderness/nodules; no carotid    bruit or JVD   Back:     Symmetric, no curvature, ROM normal, no CVA tenderness   Lungs:     Clear to auscultation bilaterally, respirations unlabored   Chest " wall:    No tenderness or deformity   Heart:    Regular rate and rhythm, S1 and S2 normal, no murmur, rub   or gallop   Abdomen:     Soft, non-tender, bowel sounds active all four quadrants,     no masses, no organomegaly   Extremities:   Normal, atraumatic, no cyanosis or edema   Pulses:   2+ and symmetric all extremities   Skin:   Skin color, texture, turgor normal, no rashes or lesions     Results    Lab Results personally reviewed   Lab Results   Component Value Date    CHOL 259 (H) 12/14/2020    CHOL 183 04/29/2019     Lab Results   Component Value Date    HDL 57 12/14/2020    HDL 44 (L) 04/29/2019     No components found for: LDLCALC  Lab Results   Component Value Date    TRIG 243 (H) 12/14/2020    TRIG 133 04/29/2019     Lab Results   Component Value Date    WBC 5.1 12/14/2020    HGB 13.6 12/14/2020    HCT 40.4 12/14/2020     12/14/2020     Lab Results   Component Value Date    BUN 17 12/14/2020     12/14/2020    CO2 29 12/14/2020

## 2021-09-29 LAB
MDC_IDC_LEAD_IMPLANT_DT: NORMAL
MDC_IDC_LEAD_IMPLANT_DT: NORMAL
MDC_IDC_LEAD_LOCATION: NORMAL
MDC_IDC_LEAD_LOCATION: NORMAL
MDC_IDC_LEAD_MFG: NORMAL
MDC_IDC_LEAD_MFG: NORMAL
MDC_IDC_LEAD_MODEL: NORMAL
MDC_IDC_LEAD_MODEL: NORMAL
MDC_IDC_LEAD_POLARITY_TYPE: NORMAL
MDC_IDC_LEAD_POLARITY_TYPE: NORMAL
MDC_IDC_LEAD_SERIAL: NORMAL
MDC_IDC_LEAD_SERIAL: NORMAL
MDC_IDC_MSMT_BATTERY_DTM: NORMAL
MDC_IDC_MSMT_BATTERY_REMAINING_LONGEVITY: 129 MO
MDC_IDC_MSMT_BATTERY_RRT_TRIGGER: 2.62
MDC_IDC_MSMT_BATTERY_STATUS: NORMAL
MDC_IDC_MSMT_BATTERY_VOLTAGE: 3 V
MDC_IDC_MSMT_LEADCHNL_RA_IMPEDANCE_VALUE: 380 OHM
MDC_IDC_MSMT_LEADCHNL_RA_IMPEDANCE_VALUE: 418 OHM
MDC_IDC_MSMT_LEADCHNL_RA_PACING_THRESHOLD_AMPLITUDE: 0.5 V
MDC_IDC_MSMT_LEADCHNL_RA_PACING_THRESHOLD_AMPLITUDE: 0.5 V
MDC_IDC_MSMT_LEADCHNL_RA_PACING_THRESHOLD_PULSEWIDTH: 0.4 MS
MDC_IDC_MSMT_LEADCHNL_RA_PACING_THRESHOLD_PULSEWIDTH: 0.4 MS
MDC_IDC_MSMT_LEADCHNL_RA_SENSING_INTR_AMPL: 2.25 MV
MDC_IDC_MSMT_LEADCHNL_RV_IMPEDANCE_VALUE: 342 OHM
MDC_IDC_MSMT_LEADCHNL_RV_IMPEDANCE_VALUE: 399 OHM
MDC_IDC_MSMT_LEADCHNL_RV_PACING_THRESHOLD_AMPLITUDE: 2 V
MDC_IDC_MSMT_LEADCHNL_RV_PACING_THRESHOLD_AMPLITUDE: 2 V
MDC_IDC_MSMT_LEADCHNL_RV_PACING_THRESHOLD_PULSEWIDTH: 0.4 MS
MDC_IDC_MSMT_LEADCHNL_RV_PACING_THRESHOLD_PULSEWIDTH: 0.4 MS
MDC_IDC_MSMT_LEADCHNL_RV_SENSING_INTR_AMPL: 12 MV
MDC_IDC_MSMT_LEADCHNL_RV_SENSING_INTR_AMPL: 13.88 MV
MDC_IDC_PG_IMPLANT_DTM: NORMAL
MDC_IDC_PG_MFG: NORMAL
MDC_IDC_PG_MODEL: NORMAL
MDC_IDC_PG_SERIAL: NORMAL
MDC_IDC_PG_TYPE: NORMAL
MDC_IDC_SESS_CLINIC_NAME: NORMAL
MDC_IDC_SESS_DTM: NORMAL
MDC_IDC_SESS_TYPE: NORMAL
MDC_IDC_SET_BRADY_AT_MODE_SWITCH_RATE: 171 {BEATS}/MIN
MDC_IDC_SET_BRADY_HYSTRATE: NORMAL
MDC_IDC_SET_BRADY_LOWRATE: 70 {BEATS}/MIN
MDC_IDC_SET_BRADY_MAX_SENSOR_RATE: 145 {BEATS}/MIN
MDC_IDC_SET_BRADY_MAX_TRACKING_RATE: 145 {BEATS}/MIN
MDC_IDC_SET_BRADY_MODE: NORMAL
MDC_IDC_SET_BRADY_PAV_DELAY_LOW: 180 MS
MDC_IDC_SET_BRADY_SAV_DELAY_LOW: 150 MS
MDC_IDC_SET_LEADCHNL_RA_PACING_AMPLITUDE: NORMAL
MDC_IDC_SET_LEADCHNL_RA_PACING_ANODE_ELECTRODE_1: NORMAL
MDC_IDC_SET_LEADCHNL_RA_PACING_ANODE_LOCATION_1: NORMAL
MDC_IDC_SET_LEADCHNL_RA_PACING_CAPTURE_MODE: NORMAL
MDC_IDC_SET_LEADCHNL_RA_PACING_CATHODE_ELECTRODE_1: NORMAL
MDC_IDC_SET_LEADCHNL_RA_PACING_CATHODE_LOCATION_1: NORMAL
MDC_IDC_SET_LEADCHNL_RA_PACING_POLARITY: NORMAL
MDC_IDC_SET_LEADCHNL_RA_PACING_PULSEWIDTH: 0.4 MS
MDC_IDC_SET_LEADCHNL_RA_SENSING_ANODE_ELECTRODE_1: NORMAL
MDC_IDC_SET_LEADCHNL_RA_SENSING_ANODE_LOCATION_1: NORMAL
MDC_IDC_SET_LEADCHNL_RA_SENSING_CATHODE_ELECTRODE_1: NORMAL
MDC_IDC_SET_LEADCHNL_RA_SENSING_CATHODE_LOCATION_1: NORMAL
MDC_IDC_SET_LEADCHNL_RA_SENSING_POLARITY: NORMAL
MDC_IDC_SET_LEADCHNL_RA_SENSING_SENSITIVITY: 0.6 MV
MDC_IDC_SET_LEADCHNL_RV_PACING_AMPLITUDE: NORMAL
MDC_IDC_SET_LEADCHNL_RV_PACING_ANODE_ELECTRODE_1: NORMAL
MDC_IDC_SET_LEADCHNL_RV_PACING_ANODE_LOCATION_1: NORMAL
MDC_IDC_SET_LEADCHNL_RV_PACING_CAPTURE_MODE: NORMAL
MDC_IDC_SET_LEADCHNL_RV_PACING_CATHODE_ELECTRODE_1: NORMAL
MDC_IDC_SET_LEADCHNL_RV_PACING_CATHODE_LOCATION_1: NORMAL
MDC_IDC_SET_LEADCHNL_RV_PACING_POLARITY: NORMAL
MDC_IDC_SET_LEADCHNL_RV_PACING_PULSEWIDTH: 0.4 MS
MDC_IDC_SET_LEADCHNL_RV_SENSING_ANODE_ELECTRODE_1: NORMAL
MDC_IDC_SET_LEADCHNL_RV_SENSING_ANODE_LOCATION_1: NORMAL
MDC_IDC_SET_LEADCHNL_RV_SENSING_CATHODE_ELECTRODE_1: NORMAL
MDC_IDC_SET_LEADCHNL_RV_SENSING_CATHODE_LOCATION_1: NORMAL
MDC_IDC_SET_LEADCHNL_RV_SENSING_POLARITY: NORMAL
MDC_IDC_SET_LEADCHNL_RV_SENSING_SENSITIVITY: 0.9 MV
MDC_IDC_SET_ZONE_DETECTION_INTERVAL: 350 MS
MDC_IDC_SET_ZONE_DETECTION_INTERVAL: 400 MS
MDC_IDC_SET_ZONE_TYPE: NORMAL
MDC_IDC_STAT_AT_BURDEN_PERCENT: 0 %
MDC_IDC_STAT_AT_DTM_END: NORMAL
MDC_IDC_STAT_AT_DTM_START: NORMAL
MDC_IDC_STAT_AT_MODE_SW_COUNT: 16
MDC_IDC_STAT_BRADY_AP_VP_PERCENT: 2.72 %
MDC_IDC_STAT_BRADY_AP_VS_PERCENT: 96.74 %
MDC_IDC_STAT_BRADY_AS_VP_PERCENT: 0.06 %
MDC_IDC_STAT_BRADY_AS_VS_PERCENT: 0.49 %
MDC_IDC_STAT_BRADY_DTM_END: NORMAL
MDC_IDC_STAT_BRADY_DTM_START: NORMAL
MDC_IDC_STAT_BRADY_RA_PERCENT_PACED: 99.28 %
MDC_IDC_STAT_BRADY_RV_PERCENT_PACED: 2.78 %
MDC_IDC_STAT_EPISODE_RECENT_COUNT: 0
MDC_IDC_STAT_EPISODE_RECENT_COUNT: 0
MDC_IDC_STAT_EPISODE_RECENT_COUNT: 16
MDC_IDC_STAT_EPISODE_RECENT_COUNT_DTM_END: NORMAL
MDC_IDC_STAT_EPISODE_RECENT_COUNT_DTM_START: NORMAL
MDC_IDC_STAT_EPISODE_TOTAL_COUNT: 0
MDC_IDC_STAT_EPISODE_TOTAL_COUNT: 0
MDC_IDC_STAT_EPISODE_TOTAL_COUNT: 16
MDC_IDC_STAT_EPISODE_TOTAL_COUNT_DTM_END: NORMAL
MDC_IDC_STAT_EPISODE_TOTAL_COUNT_DTM_START: NORMAL
MDC_IDC_STAT_EPISODE_TYPE: NORMAL

## 2021-10-06 DIAGNOSIS — G40.009 PARTIAL IDIOPATHIC EPILEPSY WITH SEIZURES OF LOCALIZED ONSET, NOT INTRACTABLE, WITHOUT STATUS EPILEPTICUS (H): ICD-10-CM

## 2021-10-07 NOTE — TELEPHONE ENCOUNTER
Refill request for levetiracetam.  Pt has upcoming appt on 4/4/22.  Medication T'd for review and signature    Carolyn Montoya LPN on 10/7/2021 at 9:51 AM

## 2021-10-08 RX ORDER — LEVETIRACETAM 250 MG/1
TABLET ORAL
Qty: 360 TABLET | Refills: 2 | Status: SHIPPED | OUTPATIENT
Start: 2021-10-08 | End: 2022-04-04

## 2021-10-14 DIAGNOSIS — G40.009 PARTIAL IDIOPATHIC EPILEPSY WITH SEIZURES OF LOCALIZED ONSET, NOT INTRACTABLE, WITHOUT STATUS EPILEPTICUS (H): ICD-10-CM

## 2021-10-14 NOTE — TELEPHONE ENCOUNTER
Refill request for phenytoin.  Pt has upcoming appt on 4/4/22.  Medication T'd for review and signature    Carolyn Montoya LPN on 10/14/2021 at 3:50 PM

## 2021-10-15 RX ORDER — PHENYTOIN SODIUM 100 MG/1
CAPSULE, EXTENDED RELEASE ORAL
Qty: 270 CAPSULE | Refills: 2 | Status: SHIPPED | OUTPATIENT
Start: 2021-10-15 | End: 2022-04-04

## 2021-10-18 ENCOUNTER — LAB REQUISITION (OUTPATIENT)
Dept: LAB | Facility: CLINIC | Age: 66
End: 2021-10-18
Payer: MEDICARE

## 2021-10-18 DIAGNOSIS — R30.0 DYSURIA: ICD-10-CM

## 2021-10-18 PROCEDURE — 87086 URINE CULTURE/COLONY COUNT: CPT | Mod: ORL | Performed by: FAMILY MEDICINE

## 2021-10-21 LAB — BACTERIA UR CULT: ABNORMAL

## 2021-10-24 ENCOUNTER — HEALTH MAINTENANCE LETTER (OUTPATIENT)
Age: 66
End: 2021-10-24

## 2021-12-28 ENCOUNTER — ANCILLARY PROCEDURE (OUTPATIENT)
Dept: CARDIOLOGY | Facility: CLINIC | Age: 66
End: 2021-12-28
Attending: INTERNAL MEDICINE
Payer: MEDICARE

## 2021-12-28 DIAGNOSIS — Z95.0 PACEMAKER: ICD-10-CM

## 2021-12-28 PROCEDURE — 93296 REM INTERROG EVL PM/IDS: CPT | Performed by: INTERNAL MEDICINE

## 2021-12-28 PROCEDURE — 93294 REM INTERROG EVL PM/LDLS PM: CPT | Performed by: INTERNAL MEDICINE

## 2021-12-31 LAB
MDC_IDC_EPISODE_DTM: NORMAL
MDC_IDC_EPISODE_DURATION: 0 S
MDC_IDC_EPISODE_DURATION: 1 S
MDC_IDC_EPISODE_DURATION: 2 S
MDC_IDC_EPISODE_ID: 17
MDC_IDC_EPISODE_ID: 18
MDC_IDC_EPISODE_ID: 19
MDC_IDC_EPISODE_TYPE: NORMAL
MDC_IDC_LEAD_IMPLANT_DT: NORMAL
MDC_IDC_LEAD_IMPLANT_DT: NORMAL
MDC_IDC_LEAD_LOCATION: NORMAL
MDC_IDC_LEAD_LOCATION: NORMAL
MDC_IDC_LEAD_MFG: NORMAL
MDC_IDC_LEAD_MFG: NORMAL
MDC_IDC_LEAD_MODEL: NORMAL
MDC_IDC_LEAD_MODEL: NORMAL
MDC_IDC_LEAD_POLARITY_TYPE: NORMAL
MDC_IDC_LEAD_POLARITY_TYPE: NORMAL
MDC_IDC_LEAD_SERIAL: NORMAL
MDC_IDC_LEAD_SERIAL: NORMAL
MDC_IDC_MSMT_BATTERY_DTM: NORMAL
MDC_IDC_MSMT_BATTERY_REMAINING_LONGEVITY: 123 MO
MDC_IDC_MSMT_BATTERY_RRT_TRIGGER: 2.62
MDC_IDC_MSMT_BATTERY_STATUS: NORMAL
MDC_IDC_MSMT_BATTERY_VOLTAGE: 3 V
MDC_IDC_MSMT_LEADCHNL_RA_IMPEDANCE_VALUE: 342 OHM
MDC_IDC_MSMT_LEADCHNL_RA_IMPEDANCE_VALUE: 361 OHM
MDC_IDC_MSMT_LEADCHNL_RA_PACING_THRESHOLD_AMPLITUDE: 0.5 V
MDC_IDC_MSMT_LEADCHNL_RA_PACING_THRESHOLD_PULSEWIDTH: 0.4 MS
MDC_IDC_MSMT_LEADCHNL_RA_SENSING_INTR_AMPL: 3 MV
MDC_IDC_MSMT_LEADCHNL_RA_SENSING_INTR_AMPL: 3 MV
MDC_IDC_MSMT_LEADCHNL_RV_IMPEDANCE_VALUE: 304 OHM
MDC_IDC_MSMT_LEADCHNL_RV_IMPEDANCE_VALUE: 361 OHM
MDC_IDC_MSMT_LEADCHNL_RV_PACING_THRESHOLD_AMPLITUDE: 1.88 V
MDC_IDC_MSMT_LEADCHNL_RV_PACING_THRESHOLD_PULSEWIDTH: 0.4 MS
MDC_IDC_MSMT_LEADCHNL_RV_SENSING_INTR_AMPL: 12.12 MV
MDC_IDC_MSMT_LEADCHNL_RV_SENSING_INTR_AMPL: 12.12 MV
MDC_IDC_PG_IMPLANT_DTM: NORMAL
MDC_IDC_PG_MFG: NORMAL
MDC_IDC_PG_MODEL: NORMAL
MDC_IDC_PG_SERIAL: NORMAL
MDC_IDC_PG_TYPE: NORMAL
MDC_IDC_SESS_CLINIC_NAME: NORMAL
MDC_IDC_SESS_DTM: NORMAL
MDC_IDC_SESS_TYPE: NORMAL
MDC_IDC_SET_BRADY_AT_MODE_SWITCH_RATE: 171 {BEATS}/MIN
MDC_IDC_SET_BRADY_HYSTRATE: NORMAL
MDC_IDC_SET_BRADY_LOWRATE: 70 {BEATS}/MIN
MDC_IDC_SET_BRADY_MAX_SENSOR_RATE: 145 {BEATS}/MIN
MDC_IDC_SET_BRADY_MAX_TRACKING_RATE: 145 {BEATS}/MIN
MDC_IDC_SET_BRADY_MODE: NORMAL
MDC_IDC_SET_BRADY_PAV_DELAY_LOW: 180 MS
MDC_IDC_SET_BRADY_SAV_DELAY_LOW: 150 MS
MDC_IDC_SET_LEADCHNL_RA_PACING_AMPLITUDE: 1.5 V
MDC_IDC_SET_LEADCHNL_RA_PACING_ANODE_ELECTRODE_1: NORMAL
MDC_IDC_SET_LEADCHNL_RA_PACING_ANODE_LOCATION_1: NORMAL
MDC_IDC_SET_LEADCHNL_RA_PACING_CAPTURE_MODE: NORMAL
MDC_IDC_SET_LEADCHNL_RA_PACING_CATHODE_ELECTRODE_1: NORMAL
MDC_IDC_SET_LEADCHNL_RA_PACING_CATHODE_LOCATION_1: NORMAL
MDC_IDC_SET_LEADCHNL_RA_PACING_POLARITY: NORMAL
MDC_IDC_SET_LEADCHNL_RA_PACING_PULSEWIDTH: 0.4 MS
MDC_IDC_SET_LEADCHNL_RA_SENSING_ANODE_ELECTRODE_1: NORMAL
MDC_IDC_SET_LEADCHNL_RA_SENSING_ANODE_LOCATION_1: NORMAL
MDC_IDC_SET_LEADCHNL_RA_SENSING_CATHODE_ELECTRODE_1: NORMAL
MDC_IDC_SET_LEADCHNL_RA_SENSING_CATHODE_LOCATION_1: NORMAL
MDC_IDC_SET_LEADCHNL_RA_SENSING_POLARITY: NORMAL
MDC_IDC_SET_LEADCHNL_RA_SENSING_SENSITIVITY: 0.6 MV
MDC_IDC_SET_LEADCHNL_RV_PACING_AMPLITUDE: 3 V
MDC_IDC_SET_LEADCHNL_RV_PACING_ANODE_ELECTRODE_1: NORMAL
MDC_IDC_SET_LEADCHNL_RV_PACING_ANODE_LOCATION_1: NORMAL
MDC_IDC_SET_LEADCHNL_RV_PACING_CAPTURE_MODE: NORMAL
MDC_IDC_SET_LEADCHNL_RV_PACING_CATHODE_ELECTRODE_1: NORMAL
MDC_IDC_SET_LEADCHNL_RV_PACING_CATHODE_LOCATION_1: NORMAL
MDC_IDC_SET_LEADCHNL_RV_PACING_POLARITY: NORMAL
MDC_IDC_SET_LEADCHNL_RV_PACING_PULSEWIDTH: 0.4 MS
MDC_IDC_SET_LEADCHNL_RV_SENSING_ANODE_ELECTRODE_1: NORMAL
MDC_IDC_SET_LEADCHNL_RV_SENSING_ANODE_LOCATION_1: NORMAL
MDC_IDC_SET_LEADCHNL_RV_SENSING_CATHODE_ELECTRODE_1: NORMAL
MDC_IDC_SET_LEADCHNL_RV_SENSING_CATHODE_LOCATION_1: NORMAL
MDC_IDC_SET_LEADCHNL_RV_SENSING_POLARITY: NORMAL
MDC_IDC_SET_LEADCHNL_RV_SENSING_SENSITIVITY: 0.9 MV
MDC_IDC_SET_ZONE_DETECTION_INTERVAL: 350 MS
MDC_IDC_SET_ZONE_DETECTION_INTERVAL: 400 MS
MDC_IDC_SET_ZONE_TYPE: NORMAL
MDC_IDC_STAT_AT_BURDEN_PERCENT: 0 %
MDC_IDC_STAT_AT_DTM_END: NORMAL
MDC_IDC_STAT_AT_DTM_START: NORMAL
MDC_IDC_STAT_BRADY_AP_VP_PERCENT: 3.99 %
MDC_IDC_STAT_BRADY_AP_VS_PERCENT: 95.46 %
MDC_IDC_STAT_BRADY_AS_VP_PERCENT: 0.06 %
MDC_IDC_STAT_BRADY_AS_VS_PERCENT: 0.49 %
MDC_IDC_STAT_BRADY_DTM_END: NORMAL
MDC_IDC_STAT_BRADY_DTM_START: NORMAL
MDC_IDC_STAT_BRADY_RA_PERCENT_PACED: 99.21 %
MDC_IDC_STAT_BRADY_RV_PERCENT_PACED: 4.04 %
MDC_IDC_STAT_EPISODE_RECENT_COUNT: 0
MDC_IDC_STAT_EPISODE_RECENT_COUNT: 3
MDC_IDC_STAT_EPISODE_RECENT_COUNT_DTM_END: NORMAL
MDC_IDC_STAT_EPISODE_RECENT_COUNT_DTM_START: NORMAL
MDC_IDC_STAT_EPISODE_TOTAL_COUNT: 0
MDC_IDC_STAT_EPISODE_TOTAL_COUNT: 16
MDC_IDC_STAT_EPISODE_TOTAL_COUNT: 3
MDC_IDC_STAT_EPISODE_TOTAL_COUNT_DTM_END: NORMAL
MDC_IDC_STAT_EPISODE_TOTAL_COUNT_DTM_START: NORMAL
MDC_IDC_STAT_EPISODE_TYPE: NORMAL

## 2022-04-04 ENCOUNTER — OFFICE VISIT (OUTPATIENT)
Dept: NEUROLOGY | Facility: CLINIC | Age: 67
End: 2022-04-04
Payer: MEDICARE

## 2022-04-04 ENCOUNTER — LAB (OUTPATIENT)
Dept: LAB | Facility: HOSPITAL | Age: 67
End: 2022-04-04
Payer: MEDICARE

## 2022-04-04 VITALS
BODY MASS INDEX: 29.82 KG/M2 | HEIGHT: 67 IN | WEIGHT: 190 LBS | SYSTOLIC BLOOD PRESSURE: 116 MMHG | DIASTOLIC BLOOD PRESSURE: 62 MMHG | HEART RATE: 78 BPM

## 2022-04-04 DIAGNOSIS — G40.009 PARTIAL IDIOPATHIC EPILEPSY WITH SEIZURES OF LOCALIZED ONSET, NOT INTRACTABLE, WITHOUT STATUS EPILEPTICUS (H): Primary | ICD-10-CM

## 2022-04-04 DIAGNOSIS — G40.009 PARTIAL IDIOPATHIC EPILEPSY WITH SEIZURES OF LOCALIZED ONSET, NOT INTRACTABLE, WITHOUT STATUS EPILEPTICUS (H): ICD-10-CM

## 2022-04-04 LAB
ANION GAP SERPL CALCULATED.3IONS-SCNC: 10 MMOL/L (ref 5–18)
AST SERPL W P-5'-P-CCNC: 18 U/L (ref 0–40)
BUN SERPL-MCNC: 13 MG/DL (ref 8–22)
CALCIUM SERPL-MCNC: 9.6 MG/DL (ref 8.5–10.5)
CHLORIDE BLD-SCNC: 100 MMOL/L (ref 98–107)
CO2 SERPL-SCNC: 26 MMOL/L (ref 22–31)
CREAT SERPL-MCNC: 0.82 MG/DL (ref 0.6–1.1)
ERYTHROCYTE [DISTWIDTH] IN BLOOD BY AUTOMATED COUNT: 13 % (ref 10–15)
GFR SERPL CREATININE-BSD FRML MDRD: 78 ML/MIN/1.73M2
GLUCOSE BLD-MCNC: 214 MG/DL (ref 70–125)
HCT VFR BLD AUTO: 40.2 % (ref 35–47)
HGB BLD-MCNC: 14.5 G/DL (ref 11.7–15.7)
MCH RBC QN AUTO: 33 PG (ref 26.5–33)
MCHC RBC AUTO-ENTMCNC: 36.1 G/DL (ref 31.5–36.5)
MCV RBC AUTO: 91 FL (ref 78–100)
PHENYTOIN SERPL-MCNC: 7.4 UG/ML
PLATELET # BLD AUTO: 172 10E3/UL (ref 150–450)
POTASSIUM BLD-SCNC: 3.3 MMOL/L (ref 3.5–5)
RBC # BLD AUTO: 4.4 10E6/UL (ref 3.8–5.2)
SODIUM SERPL-SCNC: 136 MMOL/L (ref 136–145)
WBC # BLD AUTO: 6.1 10E3/UL (ref 4–11)

## 2022-04-04 PROCEDURE — 99215 OFFICE O/P EST HI 40 MIN: CPT | Performed by: PSYCHIATRY & NEUROLOGY

## 2022-04-04 PROCEDURE — 80185 ASSAY OF PHENYTOIN TOTAL: CPT

## 2022-04-04 PROCEDURE — 85014 HEMATOCRIT: CPT

## 2022-04-04 PROCEDURE — 36415 COLL VENOUS BLD VENIPUNCTURE: CPT

## 2022-04-04 PROCEDURE — 84450 TRANSFERASE (AST) (SGOT): CPT

## 2022-04-04 PROCEDURE — 80177 DRUG SCRN QUAN LEVETIRACETAM: CPT

## 2022-04-04 PROCEDURE — 80048 BASIC METABOLIC PNL TOTAL CA: CPT

## 2022-04-04 RX ORDER — LEVETIRACETAM 250 MG/1
250 TABLET ORAL 2 TIMES DAILY
Qty: 180 TABLET | Refills: 3 | Status: SHIPPED | OUTPATIENT
Start: 2022-04-04 | End: 2023-01-30

## 2022-04-04 RX ORDER — PHENYTOIN SODIUM 100 MG/1
CAPSULE, EXTENDED RELEASE ORAL
Qty: 270 CAPSULE | Refills: 3 | Status: SHIPPED | OUTPATIENT
Start: 2022-04-04 | End: 2023-04-11

## 2022-04-04 NOTE — NURSING NOTE
Chief Complaint   Patient presents with     Seizures     Last seen 5/2019. Pt states she has not had any seizure activity.     Carolyn oMntoya LPN on 4/4/2022 at 10:59 AM
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
## 2022-04-04 NOTE — LETTER
April 6, 2022      Chaya Bean  [address]   [facility] [address]        Dear ,    We are writing to inform you of your test results.    Laboratory data 4/4/2022  Sodium 136 potassium 3.3 BUN 13 creatinine 0.82   Glucose 214, should talk with primary care doctor about diabetes  White blood count 6.1 hemoglobin 14.5 platelets 172,000  AST 18  Dilantin 7.4  Keppra 6.0    Your laboratory data are okay in regards to your seizure disorder  Your glucose though is high and you should follow-up with your primary MD in regards to possible diabetes.    If you have any questions or concerns, please call the clinic at the number listed above.     Sincerely,    Dr. Michael Chilel

## 2022-04-05 LAB — LEVETIRACETAM SERPL-MCNC: 6 UG/ML

## 2022-04-11 ENCOUNTER — ANCILLARY PROCEDURE (OUTPATIENT)
Dept: CARDIOLOGY | Facility: CLINIC | Age: 67
End: 2022-04-11
Attending: INTERNAL MEDICINE
Payer: MEDICARE

## 2022-04-11 DIAGNOSIS — I49.5 SICK SINUS SYNDROME (H): ICD-10-CM

## 2022-04-11 DIAGNOSIS — Z95.0 PACEMAKER: ICD-10-CM

## 2022-04-11 LAB
MDC_IDC_EPISODE_DTM: NORMAL
MDC_IDC_EPISODE_DTM: NORMAL
MDC_IDC_EPISODE_DURATION: 0 S
MDC_IDC_EPISODE_DURATION: 1 S
MDC_IDC_EPISODE_ID: 20
MDC_IDC_EPISODE_ID: 21
MDC_IDC_EPISODE_TYPE: NORMAL
MDC_IDC_EPISODE_TYPE: NORMAL
MDC_IDC_LEAD_IMPLANT_DT: NORMAL
MDC_IDC_LEAD_IMPLANT_DT: NORMAL
MDC_IDC_LEAD_LOCATION: NORMAL
MDC_IDC_LEAD_LOCATION: NORMAL
MDC_IDC_LEAD_MFG: NORMAL
MDC_IDC_LEAD_MFG: NORMAL
MDC_IDC_LEAD_MODEL: NORMAL
MDC_IDC_LEAD_MODEL: NORMAL
MDC_IDC_LEAD_POLARITY_TYPE: NORMAL
MDC_IDC_LEAD_POLARITY_TYPE: NORMAL
MDC_IDC_LEAD_SERIAL: NORMAL
MDC_IDC_LEAD_SERIAL: NORMAL
MDC_IDC_MSMT_BATTERY_DTM: NORMAL
MDC_IDC_MSMT_BATTERY_REMAINING_LONGEVITY: 121 MO
MDC_IDC_MSMT_BATTERY_RRT_TRIGGER: 2.62
MDC_IDC_MSMT_BATTERY_STATUS: NORMAL
MDC_IDC_MSMT_BATTERY_VOLTAGE: 3 V
MDC_IDC_MSMT_LEADCHNL_RA_IMPEDANCE_VALUE: 361 OHM
MDC_IDC_MSMT_LEADCHNL_RA_IMPEDANCE_VALUE: 399 OHM
MDC_IDC_MSMT_LEADCHNL_RA_PACING_THRESHOLD_AMPLITUDE: 0.62 V
MDC_IDC_MSMT_LEADCHNL_RA_PACING_THRESHOLD_PULSEWIDTH: 0.4 MS
MDC_IDC_MSMT_LEADCHNL_RA_SENSING_INTR_AMPL: 2.5 MV
MDC_IDC_MSMT_LEADCHNL_RA_SENSING_INTR_AMPL: 2.5 MV
MDC_IDC_MSMT_LEADCHNL_RV_IMPEDANCE_VALUE: 323 OHM
MDC_IDC_MSMT_LEADCHNL_RV_IMPEDANCE_VALUE: 380 OHM
MDC_IDC_MSMT_LEADCHNL_RV_PACING_THRESHOLD_AMPLITUDE: 2 V
MDC_IDC_MSMT_LEADCHNL_RV_PACING_THRESHOLD_PULSEWIDTH: 0.4 MS
MDC_IDC_MSMT_LEADCHNL_RV_SENSING_INTR_AMPL: 13.25 MV
MDC_IDC_MSMT_LEADCHNL_RV_SENSING_INTR_AMPL: 13.25 MV
MDC_IDC_PG_IMPLANT_DTM: NORMAL
MDC_IDC_PG_MFG: NORMAL
MDC_IDC_PG_MODEL: NORMAL
MDC_IDC_PG_SERIAL: NORMAL
MDC_IDC_PG_TYPE: NORMAL
MDC_IDC_SESS_CLINIC_NAME: NORMAL
MDC_IDC_SESS_DTM: NORMAL
MDC_IDC_SESS_TYPE: NORMAL
MDC_IDC_SET_BRADY_AT_MODE_SWITCH_RATE: 171 {BEATS}/MIN
MDC_IDC_SET_BRADY_HYSTRATE: NORMAL
MDC_IDC_SET_BRADY_LOWRATE: 70 {BEATS}/MIN
MDC_IDC_SET_BRADY_MAX_SENSOR_RATE: 145 {BEATS}/MIN
MDC_IDC_SET_BRADY_MAX_TRACKING_RATE: 145 {BEATS}/MIN
MDC_IDC_SET_BRADY_MODE: NORMAL
MDC_IDC_SET_BRADY_PAV_DELAY_LOW: 180 MS
MDC_IDC_SET_BRADY_SAV_DELAY_LOW: 150 MS
MDC_IDC_SET_LEADCHNL_RA_PACING_AMPLITUDE: 1.5 V
MDC_IDC_SET_LEADCHNL_RA_PACING_ANODE_ELECTRODE_1: NORMAL
MDC_IDC_SET_LEADCHNL_RA_PACING_ANODE_LOCATION_1: NORMAL
MDC_IDC_SET_LEADCHNL_RA_PACING_CAPTURE_MODE: NORMAL
MDC_IDC_SET_LEADCHNL_RA_PACING_CATHODE_ELECTRODE_1: NORMAL
MDC_IDC_SET_LEADCHNL_RA_PACING_CATHODE_LOCATION_1: NORMAL
MDC_IDC_SET_LEADCHNL_RA_PACING_POLARITY: NORMAL
MDC_IDC_SET_LEADCHNL_RA_PACING_PULSEWIDTH: 0.4 MS
MDC_IDC_SET_LEADCHNL_RA_SENSING_ANODE_ELECTRODE_1: NORMAL
MDC_IDC_SET_LEADCHNL_RA_SENSING_ANODE_LOCATION_1: NORMAL
MDC_IDC_SET_LEADCHNL_RA_SENSING_CATHODE_ELECTRODE_1: NORMAL
MDC_IDC_SET_LEADCHNL_RA_SENSING_CATHODE_LOCATION_1: NORMAL
MDC_IDC_SET_LEADCHNL_RA_SENSING_POLARITY: NORMAL
MDC_IDC_SET_LEADCHNL_RA_SENSING_SENSITIVITY: 0.6 MV
MDC_IDC_SET_LEADCHNL_RV_PACING_AMPLITUDE: 3 V
MDC_IDC_SET_LEADCHNL_RV_PACING_ANODE_ELECTRODE_1: NORMAL
MDC_IDC_SET_LEADCHNL_RV_PACING_ANODE_LOCATION_1: NORMAL
MDC_IDC_SET_LEADCHNL_RV_PACING_CAPTURE_MODE: NORMAL
MDC_IDC_SET_LEADCHNL_RV_PACING_CATHODE_ELECTRODE_1: NORMAL
MDC_IDC_SET_LEADCHNL_RV_PACING_CATHODE_LOCATION_1: NORMAL
MDC_IDC_SET_LEADCHNL_RV_PACING_POLARITY: NORMAL
MDC_IDC_SET_LEADCHNL_RV_PACING_PULSEWIDTH: 0.4 MS
MDC_IDC_SET_LEADCHNL_RV_SENSING_ANODE_ELECTRODE_1: NORMAL
MDC_IDC_SET_LEADCHNL_RV_SENSING_ANODE_LOCATION_1: NORMAL
MDC_IDC_SET_LEADCHNL_RV_SENSING_CATHODE_ELECTRODE_1: NORMAL
MDC_IDC_SET_LEADCHNL_RV_SENSING_CATHODE_LOCATION_1: NORMAL
MDC_IDC_SET_LEADCHNL_RV_SENSING_POLARITY: NORMAL
MDC_IDC_SET_LEADCHNL_RV_SENSING_SENSITIVITY: 0.9 MV
MDC_IDC_SET_ZONE_DETECTION_INTERVAL: 350 MS
MDC_IDC_SET_ZONE_DETECTION_INTERVAL: 400 MS
MDC_IDC_SET_ZONE_TYPE: NORMAL
MDC_IDC_STAT_AT_BURDEN_PERCENT: 0 %
MDC_IDC_STAT_AT_DTM_END: NORMAL
MDC_IDC_STAT_AT_DTM_START: NORMAL
MDC_IDC_STAT_BRADY_AP_VP_PERCENT: 1.28 %
MDC_IDC_STAT_BRADY_AP_VS_PERCENT: 98.29 %
MDC_IDC_STAT_BRADY_AS_VP_PERCENT: 0.02 %
MDC_IDC_STAT_BRADY_AS_VS_PERCENT: 0.41 %
MDC_IDC_STAT_BRADY_DTM_END: NORMAL
MDC_IDC_STAT_BRADY_DTM_START: NORMAL
MDC_IDC_STAT_BRADY_RA_PERCENT_PACED: 99.45 %
MDC_IDC_STAT_BRADY_RV_PERCENT_PACED: 1.3 %
MDC_IDC_STAT_EPISODE_RECENT_COUNT: 0
MDC_IDC_STAT_EPISODE_RECENT_COUNT: 2
MDC_IDC_STAT_EPISODE_RECENT_COUNT_DTM_END: NORMAL
MDC_IDC_STAT_EPISODE_RECENT_COUNT_DTM_START: NORMAL
MDC_IDC_STAT_EPISODE_TOTAL_COUNT: 0
MDC_IDC_STAT_EPISODE_TOTAL_COUNT: 16
MDC_IDC_STAT_EPISODE_TOTAL_COUNT: 5
MDC_IDC_STAT_EPISODE_TOTAL_COUNT_DTM_END: NORMAL
MDC_IDC_STAT_EPISODE_TOTAL_COUNT_DTM_START: NORMAL
MDC_IDC_STAT_EPISODE_TYPE: NORMAL

## 2022-04-11 PROCEDURE — 93296 REM INTERROG EVL PM/IDS: CPT | Performed by: INTERNAL MEDICINE

## 2022-04-11 PROCEDURE — 93294 REM INTERROG EVL PM/LDLS PM: CPT | Performed by: INTERNAL MEDICINE

## 2022-07-27 ENCOUNTER — ANCILLARY PROCEDURE (OUTPATIENT)
Dept: CARDIOLOGY | Facility: CLINIC | Age: 67
End: 2022-07-27
Attending: INTERNAL MEDICINE
Payer: MEDICARE

## 2022-07-27 DIAGNOSIS — I49.5 SICK SINUS SYNDROME (H): Primary | ICD-10-CM

## 2022-07-27 DIAGNOSIS — Z95.0 PACEMAKER: ICD-10-CM

## 2022-07-31 ENCOUNTER — HEALTH MAINTENANCE LETTER (OUTPATIENT)
Age: 67
End: 2022-07-31

## 2022-10-15 ENCOUNTER — HEALTH MAINTENANCE LETTER (OUTPATIENT)
Age: 67
End: 2022-10-15

## 2022-10-20 ENCOUNTER — LAB REQUISITION (OUTPATIENT)
Dept: LAB | Facility: CLINIC | Age: 67
End: 2022-10-20
Payer: MEDICARE

## 2022-10-20 DIAGNOSIS — E03.9 HYPOTHYROIDISM, UNSPECIFIED: ICD-10-CM

## 2022-10-20 DIAGNOSIS — G40.909 EPILEPSY, UNSPECIFIED, NOT INTRACTABLE, WITHOUT STATUS EPILEPTICUS (H): ICD-10-CM

## 2022-10-20 DIAGNOSIS — E78.2 MIXED HYPERLIPIDEMIA: ICD-10-CM

## 2022-10-20 DIAGNOSIS — I10 ESSENTIAL (PRIMARY) HYPERTENSION: ICD-10-CM

## 2022-10-20 LAB
CHOLEST SERPL-MCNC: 257 MG/DL
HDLC SERPL-MCNC: 60 MG/DL
LDLC SERPL CALC-MCNC: 154 MG/DL
LEVETIRACETAM SERPL-MCNC: 5.8 ΜG/ML (ref 10–40)
NONHDLC SERPL-MCNC: 197 MG/DL
PHENYTOIN SERPL-MCNC: 6.5 UG/ML
TRIGL SERPL-MCNC: 217 MG/DL
TSH SERPL DL<=0.005 MIU/L-ACNC: 5.1 UIU/ML (ref 0.3–4.2)

## 2022-10-20 PROCEDURE — 80185 ASSAY OF PHENYTOIN TOTAL: CPT | Mod: ORL | Performed by: FAMILY MEDICINE

## 2022-10-20 PROCEDURE — 80061 LIPID PANEL: CPT | Mod: ORL | Performed by: FAMILY MEDICINE

## 2022-10-20 PROCEDURE — 80053 COMPREHEN METABOLIC PANEL: CPT | Mod: ORL | Performed by: FAMILY MEDICINE

## 2022-10-20 PROCEDURE — 84443 ASSAY THYROID STIM HORMONE: CPT | Mod: ORL | Performed by: FAMILY MEDICINE

## 2022-10-20 PROCEDURE — 80177 DRUG SCRN QUAN LEVETIRACETAM: CPT | Mod: ORL | Performed by: FAMILY MEDICINE

## 2022-10-21 LAB
ALBUMIN SERPL BCG-MCNC: 4.2 G/DL (ref 3.5–5.2)
ALP SERPL-CCNC: 103 U/L (ref 35–104)
ALT SERPL W P-5'-P-CCNC: 25 U/L (ref 10–35)
ANION GAP SERPL CALCULATED.3IONS-SCNC: 14 MMOL/L (ref 7–15)
AST SERPL W P-5'-P-CCNC: 29 U/L (ref 10–35)
BILIRUB SERPL-MCNC: 0.4 MG/DL
BUN SERPL-MCNC: 14.8 MG/DL (ref 8–23)
CALCIUM SERPL-MCNC: 9.4 MG/DL (ref 8.8–10.2)
CHLORIDE SERPL-SCNC: 101 MMOL/L (ref 98–107)
CREAT SERPL-MCNC: 0.81 MG/DL (ref 0.51–0.95)
DEPRECATED HCO3 PLAS-SCNC: 21 MMOL/L (ref 22–29)
GFR SERPL CREATININE-BSD FRML MDRD: 79 ML/MIN/1.73M2
GLUCOSE SERPL-MCNC: 207 MG/DL (ref 70–99)
POTASSIUM SERPL-SCNC: 3.8 MMOL/L (ref 3.4–5.3)
PROT SERPL-MCNC: 6.5 G/DL (ref 6.4–8.3)
SODIUM SERPL-SCNC: 136 MMOL/L (ref 136–145)

## 2022-11-18 DIAGNOSIS — Z95.0 PACEMAKER: Primary | ICD-10-CM

## 2022-11-25 ENCOUNTER — TRANSFERRED RECORDS (OUTPATIENT)
Dept: HEALTH INFORMATION MANAGEMENT | Facility: CLINIC | Age: 67
End: 2022-11-25

## 2022-11-27 LAB
MDC_IDC_LEAD_IMPLANT_DT: NORMAL
MDC_IDC_LEAD_IMPLANT_DT: NORMAL
MDC_IDC_LEAD_LOCATION: NORMAL
MDC_IDC_LEAD_LOCATION: NORMAL
MDC_IDC_LEAD_MFG: NORMAL
MDC_IDC_LEAD_MFG: NORMAL
MDC_IDC_LEAD_MODEL: NORMAL
MDC_IDC_LEAD_MODEL: NORMAL
MDC_IDC_LEAD_POLARITY_TYPE: NORMAL
MDC_IDC_LEAD_POLARITY_TYPE: NORMAL
MDC_IDC_LEAD_SERIAL: NORMAL
MDC_IDC_LEAD_SERIAL: NORMAL
MDC_IDC_MSMT_BATTERY_DTM: NORMAL
MDC_IDC_MSMT_BATTERY_REMAINING_LONGEVITY: 116 MO
MDC_IDC_MSMT_BATTERY_RRT_TRIGGER: 2.62
MDC_IDC_MSMT_BATTERY_STATUS: NORMAL
MDC_IDC_MSMT_BATTERY_VOLTAGE: 2.99 V
MDC_IDC_MSMT_LEADCHNL_RA_IMPEDANCE_VALUE: 361 OHM
MDC_IDC_MSMT_LEADCHNL_RA_IMPEDANCE_VALUE: 361 OHM
MDC_IDC_MSMT_LEADCHNL_RA_PACING_THRESHOLD_AMPLITUDE: 0.5 V
MDC_IDC_MSMT_LEADCHNL_RA_PACING_THRESHOLD_PULSEWIDTH: 0.4 MS
MDC_IDC_MSMT_LEADCHNL_RA_SENSING_INTR_AMPL: 2.38 MV
MDC_IDC_MSMT_LEADCHNL_RA_SENSING_INTR_AMPL: 2.38 MV
MDC_IDC_MSMT_LEADCHNL_RV_IMPEDANCE_VALUE: 323 OHM
MDC_IDC_MSMT_LEADCHNL_RV_IMPEDANCE_VALUE: 380 OHM
MDC_IDC_MSMT_LEADCHNL_RV_PACING_THRESHOLD_AMPLITUDE: 1.75 V
MDC_IDC_MSMT_LEADCHNL_RV_PACING_THRESHOLD_PULSEWIDTH: 0.4 MS
MDC_IDC_MSMT_LEADCHNL_RV_SENSING_INTR_AMPL: 12.88 MV
MDC_IDC_MSMT_LEADCHNL_RV_SENSING_INTR_AMPL: 12.88 MV
MDC_IDC_PG_IMPLANT_DTM: NORMAL
MDC_IDC_PG_MFG: NORMAL
MDC_IDC_PG_MODEL: NORMAL
MDC_IDC_PG_SERIAL: NORMAL
MDC_IDC_PG_TYPE: NORMAL
MDC_IDC_SESS_CLINIC_NAME: NORMAL
MDC_IDC_SESS_DTM: NORMAL
MDC_IDC_SESS_TYPE: NORMAL
MDC_IDC_SET_BRADY_AT_MODE_SWITCH_RATE: 171 {BEATS}/MIN
MDC_IDC_SET_BRADY_HYSTRATE: NORMAL
MDC_IDC_SET_BRADY_LOWRATE: 70 {BEATS}/MIN
MDC_IDC_SET_BRADY_MAX_SENSOR_RATE: 145 {BEATS}/MIN
MDC_IDC_SET_BRADY_MAX_TRACKING_RATE: 145 {BEATS}/MIN
MDC_IDC_SET_BRADY_MODE: NORMAL
MDC_IDC_SET_BRADY_PAV_DELAY_LOW: 180 MS
MDC_IDC_SET_BRADY_SAV_DELAY_LOW: 150 MS
MDC_IDC_SET_LEADCHNL_RA_PACING_AMPLITUDE: 1.5 V
MDC_IDC_SET_LEADCHNL_RA_PACING_ANODE_ELECTRODE_1: NORMAL
MDC_IDC_SET_LEADCHNL_RA_PACING_ANODE_LOCATION_1: NORMAL
MDC_IDC_SET_LEADCHNL_RA_PACING_CAPTURE_MODE: NORMAL
MDC_IDC_SET_LEADCHNL_RA_PACING_CATHODE_ELECTRODE_1: NORMAL
MDC_IDC_SET_LEADCHNL_RA_PACING_CATHODE_LOCATION_1: NORMAL
MDC_IDC_SET_LEADCHNL_RA_PACING_POLARITY: NORMAL
MDC_IDC_SET_LEADCHNL_RA_PACING_PULSEWIDTH: 0.4 MS
MDC_IDC_SET_LEADCHNL_RA_SENSING_ANODE_ELECTRODE_1: NORMAL
MDC_IDC_SET_LEADCHNL_RA_SENSING_ANODE_LOCATION_1: NORMAL
MDC_IDC_SET_LEADCHNL_RA_SENSING_CATHODE_ELECTRODE_1: NORMAL
MDC_IDC_SET_LEADCHNL_RA_SENSING_CATHODE_LOCATION_1: NORMAL
MDC_IDC_SET_LEADCHNL_RA_SENSING_POLARITY: NORMAL
MDC_IDC_SET_LEADCHNL_RA_SENSING_SENSITIVITY: 0.6 MV
MDC_IDC_SET_LEADCHNL_RV_PACING_AMPLITUDE: 2.75 V
MDC_IDC_SET_LEADCHNL_RV_PACING_ANODE_ELECTRODE_1: NORMAL
MDC_IDC_SET_LEADCHNL_RV_PACING_ANODE_LOCATION_1: NORMAL
MDC_IDC_SET_LEADCHNL_RV_PACING_CAPTURE_MODE: NORMAL
MDC_IDC_SET_LEADCHNL_RV_PACING_CATHODE_ELECTRODE_1: NORMAL
MDC_IDC_SET_LEADCHNL_RV_PACING_CATHODE_LOCATION_1: NORMAL
MDC_IDC_SET_LEADCHNL_RV_PACING_POLARITY: NORMAL
MDC_IDC_SET_LEADCHNL_RV_PACING_PULSEWIDTH: 0.4 MS
MDC_IDC_SET_LEADCHNL_RV_SENSING_ANODE_ELECTRODE_1: NORMAL
MDC_IDC_SET_LEADCHNL_RV_SENSING_ANODE_LOCATION_1: NORMAL
MDC_IDC_SET_LEADCHNL_RV_SENSING_CATHODE_ELECTRODE_1: NORMAL
MDC_IDC_SET_LEADCHNL_RV_SENSING_CATHODE_LOCATION_1: NORMAL
MDC_IDC_SET_LEADCHNL_RV_SENSING_POLARITY: NORMAL
MDC_IDC_SET_LEADCHNL_RV_SENSING_SENSITIVITY: 0.9 MV
MDC_IDC_SET_ZONE_DETECTION_INTERVAL: 350 MS
MDC_IDC_SET_ZONE_DETECTION_INTERVAL: 400 MS
MDC_IDC_SET_ZONE_TYPE: NORMAL
MDC_IDC_STAT_AT_BURDEN_PERCENT: 0 %
MDC_IDC_STAT_AT_DTM_END: NORMAL
MDC_IDC_STAT_AT_DTM_START: NORMAL
MDC_IDC_STAT_BRADY_AP_VP_PERCENT: 7.13 %
MDC_IDC_STAT_BRADY_AP_VS_PERCENT: 92.36 %
MDC_IDC_STAT_BRADY_AS_VP_PERCENT: 0.01 %
MDC_IDC_STAT_BRADY_AS_VS_PERCENT: 0.51 %
MDC_IDC_STAT_BRADY_DTM_END: NORMAL
MDC_IDC_STAT_BRADY_DTM_START: NORMAL
MDC_IDC_STAT_BRADY_RA_PERCENT_PACED: 99.43 %
MDC_IDC_STAT_BRADY_RV_PERCENT_PACED: 7.14 %
MDC_IDC_STAT_EPISODE_RECENT_COUNT: 0
MDC_IDC_STAT_EPISODE_RECENT_COUNT_DTM_END: NORMAL
MDC_IDC_STAT_EPISODE_RECENT_COUNT_DTM_START: NORMAL
MDC_IDC_STAT_EPISODE_TOTAL_COUNT: 0
MDC_IDC_STAT_EPISODE_TOTAL_COUNT: 16
MDC_IDC_STAT_EPISODE_TOTAL_COUNT: 5
MDC_IDC_STAT_EPISODE_TOTAL_COUNT_DTM_END: NORMAL
MDC_IDC_STAT_EPISODE_TOTAL_COUNT_DTM_START: NORMAL
MDC_IDC_STAT_EPISODE_TYPE: NORMAL

## 2022-11-27 PROCEDURE — 93294 REM INTERROG EVL PM/LDLS PM: CPT | Performed by: INTERNAL MEDICINE

## 2022-11-27 PROCEDURE — 93296 REM INTERROG EVL PM/IDS: CPT | Performed by: INTERNAL MEDICINE

## 2022-12-05 ENCOUNTER — LAB REQUISITION (OUTPATIENT)
Dept: LAB | Facility: CLINIC | Age: 67
End: 2022-12-05
Payer: MEDICARE

## 2022-12-05 ENCOUNTER — TRANSFERRED RECORDS (OUTPATIENT)
Dept: HEALTH INFORMATION MANAGEMENT | Facility: CLINIC | Age: 67
End: 2022-12-05

## 2022-12-05 DIAGNOSIS — Z01.812 ENCOUNTER FOR PREPROCEDURAL LABORATORY EXAMINATION: ICD-10-CM

## 2022-12-05 PROCEDURE — U0005 INFEC AGEN DETEC AMPLI PROBE: HCPCS | Mod: ORL | Performed by: FAMILY MEDICINE

## 2022-12-06 LAB — SARS-COV-2 RNA RESP QL NAA+PROBE: NEGATIVE

## 2023-01-30 ENCOUNTER — OFFICE VISIT (OUTPATIENT)
Dept: CARDIOLOGY | Facility: CLINIC | Age: 68
End: 2023-01-30
Attending: INTERNAL MEDICINE
Payer: MEDICARE

## 2023-01-30 VITALS
HEIGHT: 67 IN | WEIGHT: 178 LBS | RESPIRATION RATE: 16 BRPM | DIASTOLIC BLOOD PRESSURE: 69 MMHG | SYSTOLIC BLOOD PRESSURE: 109 MMHG | HEART RATE: 71 BPM | BODY MASS INDEX: 27.94 KG/M2

## 2023-01-30 DIAGNOSIS — Z95.0 CARDIAC PACEMAKER IN SITU: Primary | ICD-10-CM

## 2023-01-30 DIAGNOSIS — I10 BENIGN ESSENTIAL HYPERTENSION: ICD-10-CM

## 2023-01-30 DIAGNOSIS — Z95.0 PACEMAKER: ICD-10-CM

## 2023-01-30 DIAGNOSIS — G47.33 OBSTRUCTIVE SLEEP APNEA (ADULT) (PEDIATRIC): ICD-10-CM

## 2023-01-30 DIAGNOSIS — E78.00 HYPERCHOLESTEREMIA: ICD-10-CM

## 2023-01-30 DIAGNOSIS — I49.5 SICK SINUS SYNDROME (H): ICD-10-CM

## 2023-01-30 DIAGNOSIS — I44.2 COMPLETE ATRIOVENTRICULAR BLOCK (H): ICD-10-CM

## 2023-01-30 PROCEDURE — 99213 OFFICE O/P EST LOW 20 MIN: CPT | Performed by: INTERNAL MEDICINE

## 2023-01-30 PROCEDURE — 93280 PM DEVICE PROGR EVAL DUAL: CPT | Performed by: INTERNAL MEDICINE

## 2023-01-30 RX ORDER — LEVOTHYROXINE SODIUM 137 UG/1
137 TABLET ORAL DAILY
COMMUNITY
Start: 2023-01-26

## 2023-01-30 NOTE — PROGRESS NOTES
[facility]  Heart Care Clinic Follow-up Note    Assessment & Plan        (Z95.0) Cardiac pacemaker, dual, in situ  (primary encounter diagnosis)  Comment: Due to sick sinus syndrome and left bundle branch block had placement of pacemaker 1993, Medtronic device with generator change around 2008 with Medtronic right atrial right ventricular leads.  Most recent device check shows 99.2% atrial pacing with 6.7% ventricular pacing with good rate histograms and no major arrhythmias.    (G47.33) Obstructive Sleep Apnea  Comment: Nightly CPAP.    (E78.00) Hypercholesteremia  Comment: Cholesterol significant elevated to 257 with an LDL of 154.  I have asked her to try a diet, with then arrange for repeat fasting lipids and if need be consider statin or Zetia.    (I10) Benign essential hypertension  Comment: Under good control currently on HCTZ.    Hypothyroid-acquired.  On Synthroid with TSH initially 5.1 and Synthroid has been increased.    SVT-6 beat run of atrial tachycardia seen which was entirely asymptomatic.  Monitor for these on future device checks.    Plan  1.  Given that thyroid was adjusted, use diet and check fasting lipids in about 2 months.  If cholesterol still significantly elevated consider statin or Zetia.  2.  Follow-up me 1 year or sooner if needed.    Subjective  CC: 67-year-old white female being seen in yearly follow-up.  Still living at home independently with her .  Does try to go for walks in apartment building up and down some steps.  For the most part doing this well without any syncope, presyncope, fatigue, chest pain, palpitations, shortness of breath, PND, orthopnea or peripheral edema.    Medications  Current Outpatient Medications   Medication Sig Dispense Refill     aspirin 81 MG EC tablet [ASPIRIN 81 MG EC TABLET] Take 81 mg by mouth daily.       citalopram (CELEXA) 40 MG tablet [CITALOPRAM (CELEXA) 40 MG TABLET] Take 60 mg by mouth daily.       digoxin (LANOXIN) 250 mcg  "tablet [DIGOXIN (LANOXIN) 250 MCG TABLET] Take 250 mcg by mouth every morning.       hydrochlorothiazide (HYDRODIURIL) 25 MG tablet [HYDROCHLOROTHIAZIDE (HYDRODIURIL) 25 MG TABLET] Take 25 mg by mouth daily.       levothyroxine (SYNTHROID/LEVOTHROID) 137 MCG tablet        phenytoin (DILANTIN) 100 MG capsule TAKE 1 CAPSULE BY MOUTH EVERY MORNING AND 2 CAPSULES AT BEDTIME 270 capsule 3     potassium chloride (MICRO-K) 10 mEq CR capsule [POTASSIUM CHLORIDE (MICRO-K) 10 MEQ CR CAPSULE] Take 10 mEq by mouth 2 (two) times a day.                Objective  /69 (BP Location: Right arm, Patient Position: Sitting, Cuff Size: Adult Large)   Pulse 71   Resp 16   Ht 1.689 m (5' 6.5\")   Wt 80.7 kg (178 lb)   BMI 28.30 kg/m      General Appearance:    Alert, cooperative, no distress, appears stated age   Head:    Normocephalic, without obvious abnormality, atraumatic   Throat:   Lips, mucosa, and tongue normal; teeth and gums normal   Neck:   Supple, symmetrical, trachea midline, no adenopathy;        thyroid:  No enlargement/tenderness/nodules; no carotid    bruit or JVD   Back:     Symmetric, no curvature, ROM normal, no CVA tenderness   Lungs:     Clear to auscultation bilaterally, respirations unlabored   Chest wall:    No tenderness, left-sided pacemaker   Heart:    Regular rate and rhythm, S1 and S2 normal, no murmur, rub   or gallop   Abdomen:     Soft, non-tender, bowel sounds active all four quadrants,     no masses, no organomegaly   Extremities:   Normal, atraumatic, no cyanosis or edema   Pulses:   2+ and symmetric all extremities   Skin:   Skin color, texture, turgor normal, no rashes or lesions     Results    Lab Results personally reviewed   Lab Results   Component Value Date    CHOL 257 (H) 10/20/2022    CHOL 259 (H) 12/14/2020     Lab Results   Component Value Date    HDL 60 10/20/2022    HDL 57 12/14/2020     No components found for: LDLCALC  Lab Results   Component Value Date    TRIG 217 (H) 10/20/2022 "    TRIG 243 (H) 12/14/2020     Lab Results   Component Value Date    WBC 6.1 04/04/2022    HGB 14.5 04/04/2022    HCT 40.2 04/04/2022     04/04/2022     Lab Results   Component Value Date    BUN 14.8 10/20/2022     10/20/2022    CO2 21 (L) 10/20/2022

## 2023-01-30 NOTE — LETTER
1/30/2023    Jai Proctor MD  [address]  [place] [address]    RE: Chaya Bean       Dear Colleague,     I had the pleasure of seeing Chaya Bean in the [facility].      [facility]  Heart Care Clinic Follow-up Note    Assessment & Plan        (Z95.0) Cardiac pacemaker, dual, in situ  (primary encounter diagnosis)  Comment: Due to sick sinus syndrome and left bundle branch block had placement of pacemaker 1993, Medtronic device with generator change around 2008 with Medtronic right atrial right ventricular leads.  Most recent device check shows 99.2% atrial pacing with 6.7% ventricular pacing with good rate histograms and no major arrhythmias.    (G47.33) Obstructive Sleep Apnea  Comment: Nightly CPAP.    (E78.00) Hypercholesteremia  Comment: Cholesterol significant elevated to 257 with an LDL of 154.  I have asked her to try a diet, with then arrange for repeat fasting lipids and if need be consider statin or Zetia.    (I10) Benign essential hypertension  Comment: Under good control currently on HCTZ.    Hypothyroid-acquired.  On Synthroid with TSH initially 5.1 and Synthroid has been increased.    SVT-6 beat run of atrial tachycardia seen which was entirely asymptomatic.  Monitor for these on future device checks.    Plan  1.  Given that thyroid was adjusted, use diet and check fasting lipids in about 2 months.  If cholesterol still significantly elevated consider statin or Zetia.  2.  Follow-up me 1 year or sooner if needed.    Subjective  CC: 67-year-old white female being seen in yearly follow-up.  Still living at home independently with her .  Does try to go for walks in apartment building up and down some steps.  For the most part doing this well without any syncope, presyncope, fatigue, chest pain, palpitations, shortness of breath, PND, orthopnea or peripheral edema.    Medications  Current Outpatient Medications   Medication Sig Dispense  "Refill     aspirin 81 MG EC tablet [ASPIRIN 81 MG EC TABLET] Take 81 mg by mouth daily.       citalopram (CELEXA) 40 MG tablet [CITALOPRAM (CELEXA) 40 MG TABLET] Take 60 mg by mouth daily.       digoxin (LANOXIN) 250 mcg tablet [DIGOXIN (LANOXIN) 250 MCG TABLET] Take 250 mcg by mouth every morning.       hydrochlorothiazide (HYDRODIURIL) 25 MG tablet [HYDROCHLOROTHIAZIDE (HYDRODIURIL) 25 MG TABLET] Take 25 mg by mouth daily.       levothyroxine (SYNTHROID/LEVOTHROID) 137 MCG tablet        phenytoin (DILANTIN) 100 MG capsule TAKE 1 CAPSULE BY MOUTH EVERY MORNING AND 2 CAPSULES AT BEDTIME 270 capsule 3     potassium chloride (MICRO-K) 10 mEq CR capsule [POTASSIUM CHLORIDE (MICRO-K) 10 MEQ CR CAPSULE] Take 10 mEq by mouth 2 (two) times a day.                Objective  /69 (BP Location: Right arm, Patient Position: Sitting, Cuff Size: Adult Large)   Pulse 71   Resp 16   Ht 1.689 m (5' 6.5\")   Wt 80.7 kg (178 lb)   BMI 28.30 kg/m      General Appearance:    Alert, cooperative, no distress, appears stated age   Head:    Normocephalic, without obvious abnormality, atraumatic   Throat:   Lips, mucosa, and tongue normal; teeth and gums normal   Neck:   Supple, symmetrical, trachea midline, no adenopathy;        thyroid:  No enlargement/tenderness/nodules; no carotid    bruit or JVD   Back:     Symmetric, no curvature, ROM normal, no CVA tenderness   Lungs:     Clear to auscultation bilaterally, respirations unlabored   Chest wall:    No tenderness, left-sided pacemaker   Heart:    Regular rate and rhythm, S1 and S2 normal, no murmur, rub   or gallop   Abdomen:     Soft, non-tender, bowel sounds active all four quadrants,     no masses, no organomegaly   Extremities:   Normal, atraumatic, no cyanosis or edema   Pulses:   2+ and symmetric all extremities   Skin:   Skin color, texture, turgor normal, no rashes or lesions     Results    Lab Results personally reviewed   Lab Results   Component Value Date    CHOL 257 " (H) 10/20/2022    CHOL 259 (H) 12/14/2020     Lab Results   Component Value Date    HDL 60 10/20/2022    HDL 57 12/14/2020     No components found for: LDLCALC  Lab Results   Component Value Date    TRIG 217 (H) 10/20/2022    TRIG 243 (H) 12/14/2020     Lab Results   Component Value Date    WBC 6.1 04/04/2022    HGB 14.5 04/04/2022    HCT 40.2 04/04/2022     04/04/2022     Lab Results   Component Value Date    BUN 14.8 10/20/2022     10/20/2022    CO2 21 (L) 10/20/2022               Thank you for allowing me to participate in the care of your patient.      Sincerely,     BARBRA WEST MD     [facility]  cc:   Tad Thomas MD  [address]  [place] [address]

## 2023-01-30 NOTE — PATIENT INSTRUCTIONS
Ms Larkin GORDON Love,  I enjoyed visiting with you again today.  I am glad to hear you are doing well.  Per our conversation try the best diet you can put up with and let us check the lipids in about 2 months and if still very high would consider low dose pill.  I will plan on seeing you 1 year.  Jacob Wick

## 2023-01-31 ENCOUNTER — TELEPHONE (OUTPATIENT)
Dept: CARDIOLOGY | Facility: CLINIC | Age: 68
End: 2023-01-31
Payer: MEDICARE

## 2023-01-31 LAB
MDC_IDC_EPISODE_DTM: NORMAL
MDC_IDC_EPISODE_DURATION: 1 S
MDC_IDC_EPISODE_ID: 22
MDC_IDC_EPISODE_TYPE: NORMAL
MDC_IDC_LEAD_IMPLANT_DT: NORMAL
MDC_IDC_LEAD_IMPLANT_DT: NORMAL
MDC_IDC_LEAD_LOCATION: NORMAL
MDC_IDC_LEAD_LOCATION: NORMAL
MDC_IDC_LEAD_MFG: NORMAL
MDC_IDC_LEAD_MFG: NORMAL
MDC_IDC_LEAD_MODEL: NORMAL
MDC_IDC_LEAD_MODEL: NORMAL
MDC_IDC_LEAD_POLARITY_TYPE: NORMAL
MDC_IDC_LEAD_POLARITY_TYPE: NORMAL
MDC_IDC_LEAD_SERIAL: NORMAL
MDC_IDC_LEAD_SERIAL: NORMAL
MDC_IDC_MSMT_BATTERY_DTM: NORMAL
MDC_IDC_MSMT_BATTERY_REMAINING_LONGEVITY: 110 MO
MDC_IDC_MSMT_BATTERY_RRT_TRIGGER: 2.62
MDC_IDC_MSMT_BATTERY_STATUS: NORMAL
MDC_IDC_MSMT_BATTERY_VOLTAGE: 2.99 V
MDC_IDC_MSMT_LEADCHNL_RA_IMPEDANCE_VALUE: 380 OHM
MDC_IDC_MSMT_LEADCHNL_RA_IMPEDANCE_VALUE: 418 OHM
MDC_IDC_MSMT_LEADCHNL_RA_PACING_THRESHOLD_AMPLITUDE: 0.5 V
MDC_IDC_MSMT_LEADCHNL_RA_PACING_THRESHOLD_AMPLITUDE: 0.5 V
MDC_IDC_MSMT_LEADCHNL_RA_PACING_THRESHOLD_PULSEWIDTH: 0.4 MS
MDC_IDC_MSMT_LEADCHNL_RA_PACING_THRESHOLD_PULSEWIDTH: 0.4 MS
MDC_IDC_MSMT_LEADCHNL_RA_SENSING_INTR_AMPL: 2.88 MV
MDC_IDC_MSMT_LEADCHNL_RV_IMPEDANCE_VALUE: 342 OHM
MDC_IDC_MSMT_LEADCHNL_RV_IMPEDANCE_VALUE: 418 OHM
MDC_IDC_MSMT_LEADCHNL_RV_PACING_THRESHOLD_AMPLITUDE: 1.75 V
MDC_IDC_MSMT_LEADCHNL_RV_PACING_THRESHOLD_AMPLITUDE: 2.25 V
MDC_IDC_MSMT_LEADCHNL_RV_PACING_THRESHOLD_PULSEWIDTH: 0.4 MS
MDC_IDC_MSMT_LEADCHNL_RV_PACING_THRESHOLD_PULSEWIDTH: 0.4 MS
MDC_IDC_MSMT_LEADCHNL_RV_SENSING_INTR_AMPL: 14.12 MV
MDC_IDC_MSMT_LEADCHNL_RV_SENSING_INTR_AMPL: 14.12 MV
MDC_IDC_PG_IMPLANT_DTM: NORMAL
MDC_IDC_PG_MFG: NORMAL
MDC_IDC_PG_MODEL: NORMAL
MDC_IDC_PG_SERIAL: NORMAL
MDC_IDC_PG_TYPE: NORMAL
MDC_IDC_SESS_CLINIC_NAME: NORMAL
MDC_IDC_SESS_DTM: NORMAL
MDC_IDC_SESS_TYPE: NORMAL
MDC_IDC_SET_BRADY_AT_MODE_SWITCH_RATE: 171 {BEATS}/MIN
MDC_IDC_SET_BRADY_HYSTRATE: NORMAL
MDC_IDC_SET_BRADY_LOWRATE: 70 {BEATS}/MIN
MDC_IDC_SET_BRADY_MAX_SENSOR_RATE: 145 {BEATS}/MIN
MDC_IDC_SET_BRADY_MAX_TRACKING_RATE: 145 {BEATS}/MIN
MDC_IDC_SET_BRADY_MODE: NORMAL
MDC_IDC_SET_BRADY_PAV_DELAY_LOW: 180 MS
MDC_IDC_SET_BRADY_SAV_DELAY_LOW: 150 MS
MDC_IDC_SET_LEADCHNL_RA_PACING_AMPLITUDE: NORMAL
MDC_IDC_SET_LEADCHNL_RA_PACING_ANODE_ELECTRODE_1: NORMAL
MDC_IDC_SET_LEADCHNL_RA_PACING_ANODE_LOCATION_1: NORMAL
MDC_IDC_SET_LEADCHNL_RA_PACING_CAPTURE_MODE: NORMAL
MDC_IDC_SET_LEADCHNL_RA_PACING_CATHODE_ELECTRODE_1: NORMAL
MDC_IDC_SET_LEADCHNL_RA_PACING_CATHODE_LOCATION_1: NORMAL
MDC_IDC_SET_LEADCHNL_RA_PACING_POLARITY: NORMAL
MDC_IDC_SET_LEADCHNL_RA_PACING_PULSEWIDTH: 0.4 MS
MDC_IDC_SET_LEADCHNL_RA_SENSING_ANODE_ELECTRODE_1: NORMAL
MDC_IDC_SET_LEADCHNL_RA_SENSING_ANODE_LOCATION_1: NORMAL
MDC_IDC_SET_LEADCHNL_RA_SENSING_CATHODE_ELECTRODE_1: NORMAL
MDC_IDC_SET_LEADCHNL_RA_SENSING_CATHODE_LOCATION_1: NORMAL
MDC_IDC_SET_LEADCHNL_RA_SENSING_POLARITY: NORMAL
MDC_IDC_SET_LEADCHNL_RA_SENSING_SENSITIVITY: 0.6 MV
MDC_IDC_SET_LEADCHNL_RV_PACING_AMPLITUDE: NORMAL
MDC_IDC_SET_LEADCHNL_RV_PACING_ANODE_ELECTRODE_1: NORMAL
MDC_IDC_SET_LEADCHNL_RV_PACING_ANODE_LOCATION_1: NORMAL
MDC_IDC_SET_LEADCHNL_RV_PACING_CAPTURE_MODE: NORMAL
MDC_IDC_SET_LEADCHNL_RV_PACING_CATHODE_ELECTRODE_1: NORMAL
MDC_IDC_SET_LEADCHNL_RV_PACING_CATHODE_LOCATION_1: NORMAL
MDC_IDC_SET_LEADCHNL_RV_PACING_POLARITY: NORMAL
MDC_IDC_SET_LEADCHNL_RV_PACING_PULSEWIDTH: 0.4 MS
MDC_IDC_SET_LEADCHNL_RV_SENSING_ANODE_ELECTRODE_1: NORMAL
MDC_IDC_SET_LEADCHNL_RV_SENSING_ANODE_LOCATION_1: NORMAL
MDC_IDC_SET_LEADCHNL_RV_SENSING_CATHODE_ELECTRODE_1: NORMAL
MDC_IDC_SET_LEADCHNL_RV_SENSING_CATHODE_LOCATION_1: NORMAL
MDC_IDC_SET_LEADCHNL_RV_SENSING_POLARITY: NORMAL
MDC_IDC_SET_LEADCHNL_RV_SENSING_SENSITIVITY: 0.9 MV
MDC_IDC_SET_ZONE_DETECTION_INTERVAL: 350 MS
MDC_IDC_SET_ZONE_DETECTION_INTERVAL: 400 MS
MDC_IDC_SET_ZONE_TYPE: NORMAL
MDC_IDC_STAT_AT_BURDEN_PERCENT: 0 %
MDC_IDC_STAT_AT_DTM_END: NORMAL
MDC_IDC_STAT_AT_DTM_START: NORMAL
MDC_IDC_STAT_AT_MODE_SW_COUNT: 0
MDC_IDC_STAT_BRADY_AP_VP_PERCENT: 6.59 %
MDC_IDC_STAT_BRADY_AP_VS_PERCENT: 92.85 %
MDC_IDC_STAT_BRADY_AS_VP_PERCENT: 0.06 %
MDC_IDC_STAT_BRADY_AS_VS_PERCENT: 0.5 %
MDC_IDC_STAT_BRADY_DTM_END: NORMAL
MDC_IDC_STAT_BRADY_DTM_START: NORMAL
MDC_IDC_STAT_BRADY_RA_PERCENT_PACED: 99.24 %
MDC_IDC_STAT_BRADY_RV_PERCENT_PACED: 6.65 %
MDC_IDC_STAT_EPISODE_RECENT_COUNT: 0
MDC_IDC_STAT_EPISODE_RECENT_COUNT: 0
MDC_IDC_STAT_EPISODE_RECENT_COUNT: 6
MDC_IDC_STAT_EPISODE_RECENT_COUNT: 6
MDC_IDC_STAT_EPISODE_RECENT_COUNT_DTM_END: NORMAL
MDC_IDC_STAT_EPISODE_RECENT_COUNT_DTM_START: NORMAL
MDC_IDC_STAT_EPISODE_TOTAL_COUNT: 0
MDC_IDC_STAT_EPISODE_TOTAL_COUNT: 16
MDC_IDC_STAT_EPISODE_TOTAL_COUNT: 6
MDC_IDC_STAT_EPISODE_TOTAL_COUNT_DTM_END: NORMAL
MDC_IDC_STAT_EPISODE_TOTAL_COUNT_DTM_START: NORMAL
MDC_IDC_STAT_EPISODE_TYPE: NORMAL

## 2023-01-31 NOTE — TELEPHONE ENCOUNTER
[facility] Call Center    Phone Message    May a detailed message be left on voicemail: yes     Reason for Call: Other: Please fax lab orders for Lipids over to [facility] PH # [phone]  or [phone]    Action Taken: Other: Cardiology    Travel Screening: Not Applicable     Thank you!  [facility]

## 2023-03-29 ENCOUNTER — LAB REQUISITION (OUTPATIENT)
Dept: LAB | Facility: CLINIC | Age: 68
End: 2023-03-29
Payer: MEDICARE

## 2023-03-29 DIAGNOSIS — E78.2 MIXED HYPERLIPIDEMIA: ICD-10-CM

## 2023-03-29 LAB
CHOLEST SERPL-MCNC: 240 MG/DL
HDLC SERPL-MCNC: 54 MG/DL
LDLC SERPL CALC-MCNC: 144 MG/DL
NONHDLC SERPL-MCNC: 186 MG/DL
TRIGL SERPL-MCNC: 210 MG/DL

## 2023-03-29 PROCEDURE — 80061 LIPID PANEL: CPT | Mod: ORL | Performed by: FAMILY MEDICINE
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
## 2023-04-11 ENCOUNTER — OFFICE VISIT (OUTPATIENT)
Dept: NEUROLOGY | Facility: CLINIC | Age: 68
End: 2023-04-11
Payer: MEDICARE

## 2023-04-11 ENCOUNTER — LAB (OUTPATIENT)
Dept: LAB | Facility: HOSPITAL | Age: 68
End: 2023-04-11
Payer: MEDICARE

## 2023-04-11 VITALS
DIASTOLIC BLOOD PRESSURE: 72 MMHG | HEIGHT: 67 IN | WEIGHT: 177 LBS | SYSTOLIC BLOOD PRESSURE: 130 MMHG | BODY MASS INDEX: 27.78 KG/M2 | HEART RATE: 75 BPM

## 2023-04-11 DIAGNOSIS — G40.009 PARTIAL IDIOPATHIC EPILEPSY WITH SEIZURES OF LOCALIZED ONSET, NOT INTRACTABLE, WITHOUT STATUS EPILEPTICUS (H): ICD-10-CM

## 2023-04-11 DIAGNOSIS — G40.009 PARTIAL IDIOPATHIC EPILEPSY WITH SEIZURES OF LOCALIZED ONSET, NOT INTRACTABLE, WITHOUT STATUS EPILEPTICUS (H): Primary | ICD-10-CM

## 2023-04-11 LAB
ANION GAP SERPL CALCULATED.3IONS-SCNC: 10 MMOL/L (ref 7–15)
AST SERPL W P-5'-P-CCNC: 27 U/L (ref 10–35)
BUN SERPL-MCNC: 17.2 MG/DL (ref 8–23)
CALCIUM SERPL-MCNC: 9.6 MG/DL (ref 8.8–10.2)
CHLORIDE SERPL-SCNC: 102 MMOL/L (ref 98–107)
CREAT SERPL-MCNC: 0.67 MG/DL (ref 0.51–0.95)
DEPRECATED HCO3 PLAS-SCNC: 25 MMOL/L (ref 22–29)
ERYTHROCYTE [DISTWIDTH] IN BLOOD BY AUTOMATED COUNT: 13.2 % (ref 10–15)
GFR SERPL CREATININE-BSD FRML MDRD: >90 ML/MIN/1.73M2
GLUCOSE SERPL-MCNC: 154 MG/DL (ref 70–99)
HCT VFR BLD AUTO: 39.5 % (ref 35–47)
HGB BLD-MCNC: 13.9 G/DL (ref 11.7–15.7)
MCH RBC QN AUTO: 32.1 PG (ref 26.5–33)
MCHC RBC AUTO-ENTMCNC: 35.2 G/DL (ref 31.5–36.5)
MCV RBC AUTO: 91 FL (ref 78–100)
PHENYTOIN SERPL-MCNC: 6.5 UG/ML
PLATELET # BLD AUTO: 173 10E3/UL (ref 150–450)
POTASSIUM SERPL-SCNC: 3.5 MMOL/L (ref 3.4–5.3)
RBC # BLD AUTO: 4.33 10E6/UL (ref 3.8–5.2)
SODIUM SERPL-SCNC: 137 MMOL/L (ref 136–145)
WBC # BLD AUTO: 5.8 10E3/UL (ref 4–11)

## 2023-04-11 PROCEDURE — 36415 COLL VENOUS BLD VENIPUNCTURE: CPT

## 2023-04-11 PROCEDURE — 85027 COMPLETE CBC AUTOMATED: CPT

## 2023-04-11 PROCEDURE — 82310 ASSAY OF CALCIUM: CPT

## 2023-04-11 PROCEDURE — 99214 OFFICE O/P EST MOD 30 MIN: CPT | Performed by: PSYCHIATRY & NEUROLOGY

## 2023-04-11 PROCEDURE — 80185 ASSAY OF PHENYTOIN TOTAL: CPT

## 2023-04-11 PROCEDURE — 84450 TRANSFERASE (AST) (SGOT): CPT

## 2023-04-11 RX ORDER — PHENYTOIN SODIUM 100 MG/1
CAPSULE, EXTENDED RELEASE ORAL
Qty: 270 CAPSULE | Refills: 3 | Status: SHIPPED | OUTPATIENT
Start: 2023-04-11 | End: 2024-04-04

## 2023-04-11 RX ORDER — LEVETIRACETAM 250 MG/1
250 TABLET ORAL 2 TIMES DAILY
Qty: 180 TABLET | Refills: 11 | Status: SHIPPED | OUTPATIENT
Start: 2023-04-11 | End: 2024-04-16

## 2023-04-11 RX ORDER — ROSUVASTATIN CALCIUM 10 MG/1
TABLET, COATED ORAL
COMMUNITY
Start: 2023-04-10
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
## 2023-04-11 NOTE — NURSING NOTE
Chief Complaint   Patient presents with     Seizures     Annual follow up. Pt denies any seizures.     Carolyn Montoya LPN on 4/11/2023 at 11:14 AM

## 2023-04-11 NOTE — LETTER
April 12, 2023      Chaya Bean  [address]   [facility] [address]        Dear ,    We are writing to inform you of your test results.    Laboratory data 4/11/2023  Sodium 137 potassium 3.5, BUN 17.2, creatinine 0.67, glucose 154  WBC 5.8, hemoglobin 13.9, platelets 173,000  AST 27  Dilantin 6.5  Laboratory data and level look good continue medications the same  Keep follow-up visit next year 4/11/2024    If you have any questions or concerns, please call the clinic at the number listed above.       Sincerely,    Dr. Michael Chilel

## 2023-05-01 ENCOUNTER — ANCILLARY PROCEDURE (OUTPATIENT)
Dept: CARDIOLOGY | Facility: CLINIC | Age: 68
End: 2023-05-01
Attending: INTERNAL MEDICINE
Payer: MEDICARE

## 2023-05-01 DIAGNOSIS — Z95.0 PACEMAKER: ICD-10-CM

## 2023-05-01 DIAGNOSIS — I49.5 SICK SINUS SYNDROME (H): ICD-10-CM

## 2023-05-04 LAB
MDC_IDC_EPISODE_DTM: NORMAL
MDC_IDC_EPISODE_DURATION: 0 S
MDC_IDC_EPISODE_DURATION: 1 S
MDC_IDC_EPISODE_DURATION: 132 S
MDC_IDC_EPISODE_ID: 23
MDC_IDC_EPISODE_ID: 24
MDC_IDC_EPISODE_ID: 25
MDC_IDC_EPISODE_ID: 26
MDC_IDC_EPISODE_ID: 27
MDC_IDC_EPISODE_ID: 28
MDC_IDC_EPISODE_ID: 29
MDC_IDC_EPISODE_ID: 30
MDC_IDC_EPISODE_ID: 31
MDC_IDC_EPISODE_ID: 32
MDC_IDC_EPISODE_TYPE: NORMAL
MDC_IDC_LEAD_IMPLANT_DT: NORMAL
MDC_IDC_LEAD_IMPLANT_DT: NORMAL
MDC_IDC_LEAD_LOCATION: NORMAL
MDC_IDC_LEAD_LOCATION: NORMAL
MDC_IDC_LEAD_MFG: NORMAL
MDC_IDC_LEAD_MFG: NORMAL
MDC_IDC_LEAD_MODEL: NORMAL
MDC_IDC_LEAD_MODEL: NORMAL
MDC_IDC_LEAD_POLARITY_TYPE: NORMAL
MDC_IDC_LEAD_POLARITY_TYPE: NORMAL
MDC_IDC_LEAD_SERIAL: NORMAL
MDC_IDC_LEAD_SERIAL: NORMAL
MDC_IDC_MSMT_BATTERY_DTM: NORMAL
MDC_IDC_MSMT_BATTERY_REMAINING_LONGEVITY: 105 MO
MDC_IDC_MSMT_BATTERY_RRT_TRIGGER: 2.62
MDC_IDC_MSMT_BATTERY_STATUS: NORMAL
MDC_IDC_MSMT_BATTERY_VOLTAGE: 2.98 V
MDC_IDC_MSMT_LEADCHNL_RA_IMPEDANCE_VALUE: 361 OHM
MDC_IDC_MSMT_LEADCHNL_RA_IMPEDANCE_VALUE: 380 OHM
MDC_IDC_MSMT_LEADCHNL_RA_PACING_THRESHOLD_AMPLITUDE: 0.5 V
MDC_IDC_MSMT_LEADCHNL_RA_PACING_THRESHOLD_PULSEWIDTH: 0.4 MS
MDC_IDC_MSMT_LEADCHNL_RA_SENSING_INTR_AMPL: 2 MV
MDC_IDC_MSMT_LEADCHNL_RA_SENSING_INTR_AMPL: 2 MV
MDC_IDC_MSMT_LEADCHNL_RV_IMPEDANCE_VALUE: 323 OHM
MDC_IDC_MSMT_LEADCHNL_RV_IMPEDANCE_VALUE: 380 OHM
MDC_IDC_MSMT_LEADCHNL_RV_PACING_THRESHOLD_AMPLITUDE: 1.88 V
MDC_IDC_MSMT_LEADCHNL_RV_PACING_THRESHOLD_PULSEWIDTH: 0.4 MS
MDC_IDC_MSMT_LEADCHNL_RV_SENSING_INTR_AMPL: 11.5 MV
MDC_IDC_MSMT_LEADCHNL_RV_SENSING_INTR_AMPL: 11.5 MV
MDC_IDC_PG_IMPLANT_DTM: NORMAL
MDC_IDC_PG_MFG: NORMAL
MDC_IDC_PG_MODEL: NORMAL
MDC_IDC_PG_SERIAL: NORMAL
MDC_IDC_PG_TYPE: NORMAL
MDC_IDC_SESS_CLINIC_NAME: NORMAL
MDC_IDC_SESS_DTM: NORMAL
MDC_IDC_SESS_TYPE: NORMAL
MDC_IDC_SET_BRADY_AT_MODE_SWITCH_RATE: 171 {BEATS}/MIN
MDC_IDC_SET_BRADY_HYSTRATE: NORMAL
MDC_IDC_SET_BRADY_LOWRATE: 70 {BEATS}/MIN
MDC_IDC_SET_BRADY_MAX_SENSOR_RATE: 145 {BEATS}/MIN
MDC_IDC_SET_BRADY_MAX_TRACKING_RATE: 145 {BEATS}/MIN
MDC_IDC_SET_BRADY_MODE: NORMAL
MDC_IDC_SET_BRADY_PAV_DELAY_LOW: 180 MS
MDC_IDC_SET_BRADY_SAV_DELAY_LOW: 150 MS
MDC_IDC_SET_LEADCHNL_RA_PACING_AMPLITUDE: 1.5 V
MDC_IDC_SET_LEADCHNL_RA_PACING_ANODE_ELECTRODE_1: NORMAL
MDC_IDC_SET_LEADCHNL_RA_PACING_ANODE_LOCATION_1: NORMAL
MDC_IDC_SET_LEADCHNL_RA_PACING_CAPTURE_MODE: NORMAL
MDC_IDC_SET_LEADCHNL_RA_PACING_CATHODE_ELECTRODE_1: NORMAL
MDC_IDC_SET_LEADCHNL_RA_PACING_CATHODE_LOCATION_1: NORMAL
MDC_IDC_SET_LEADCHNL_RA_PACING_POLARITY: NORMAL
MDC_IDC_SET_LEADCHNL_RA_PACING_PULSEWIDTH: 0.4 MS
MDC_IDC_SET_LEADCHNL_RA_SENSING_ANODE_ELECTRODE_1: NORMAL
MDC_IDC_SET_LEADCHNL_RA_SENSING_ANODE_LOCATION_1: NORMAL
MDC_IDC_SET_LEADCHNL_RA_SENSING_CATHODE_ELECTRODE_1: NORMAL
MDC_IDC_SET_LEADCHNL_RA_SENSING_CATHODE_LOCATION_1: NORMAL
MDC_IDC_SET_LEADCHNL_RA_SENSING_POLARITY: NORMAL
MDC_IDC_SET_LEADCHNL_RA_SENSING_SENSITIVITY: 0.6 MV
MDC_IDC_SET_LEADCHNL_RV_PACING_AMPLITUDE: 3 V
MDC_IDC_SET_LEADCHNL_RV_PACING_ANODE_ELECTRODE_1: NORMAL
MDC_IDC_SET_LEADCHNL_RV_PACING_ANODE_LOCATION_1: NORMAL
MDC_IDC_SET_LEADCHNL_RV_PACING_CAPTURE_MODE: NORMAL
MDC_IDC_SET_LEADCHNL_RV_PACING_CATHODE_ELECTRODE_1: NORMAL
MDC_IDC_SET_LEADCHNL_RV_PACING_CATHODE_LOCATION_1: NORMAL
MDC_IDC_SET_LEADCHNL_RV_PACING_POLARITY: NORMAL
MDC_IDC_SET_LEADCHNL_RV_PACING_PULSEWIDTH: 0.4 MS
MDC_IDC_SET_LEADCHNL_RV_SENSING_ANODE_ELECTRODE_1: NORMAL
MDC_IDC_SET_LEADCHNL_RV_SENSING_ANODE_LOCATION_1: NORMAL
MDC_IDC_SET_LEADCHNL_RV_SENSING_CATHODE_ELECTRODE_1: NORMAL
MDC_IDC_SET_LEADCHNL_RV_SENSING_CATHODE_LOCATION_1: NORMAL
MDC_IDC_SET_LEADCHNL_RV_SENSING_POLARITY: NORMAL
MDC_IDC_SET_LEADCHNL_RV_SENSING_SENSITIVITY: 0.9 MV
MDC_IDC_SET_ZONE_DETECTION_INTERVAL: 350 MS
MDC_IDC_SET_ZONE_DETECTION_INTERVAL: 400 MS
MDC_IDC_SET_ZONE_TYPE: NORMAL
MDC_IDC_STAT_AT_BURDEN_PERCENT: 0 %
MDC_IDC_STAT_AT_DTM_END: NORMAL
MDC_IDC_STAT_AT_DTM_START: NORMAL
MDC_IDC_STAT_BRADY_AP_VP_PERCENT: 22.36 %
MDC_IDC_STAT_BRADY_AP_VS_PERCENT: 74.72 %
MDC_IDC_STAT_BRADY_AS_VP_PERCENT: 2.25 %
MDC_IDC_STAT_BRADY_AS_VS_PERCENT: 0.67 %
MDC_IDC_STAT_BRADY_DTM_END: NORMAL
MDC_IDC_STAT_BRADY_DTM_START: NORMAL
MDC_IDC_STAT_BRADY_RA_PERCENT_PACED: 95.91 %
MDC_IDC_STAT_BRADY_RV_PERCENT_PACED: 24.61 %
MDC_IDC_STAT_EPISODE_RECENT_COUNT: 0
MDC_IDC_STAT_EPISODE_RECENT_COUNT: 1
MDC_IDC_STAT_EPISODE_RECENT_COUNT: 9
MDC_IDC_STAT_EPISODE_RECENT_COUNT_DTM_END: NORMAL
MDC_IDC_STAT_EPISODE_RECENT_COUNT_DTM_START: NORMAL
MDC_IDC_STAT_EPISODE_TOTAL_COUNT: 0
MDC_IDC_STAT_EPISODE_TOTAL_COUNT: 15
MDC_IDC_STAT_EPISODE_TOTAL_COUNT: 17
MDC_IDC_STAT_EPISODE_TOTAL_COUNT_DTM_END: NORMAL
MDC_IDC_STAT_EPISODE_TOTAL_COUNT_DTM_START: NORMAL
MDC_IDC_STAT_EPISODE_TYPE: NORMAL

## 2023-05-04 PROCEDURE — 93294 REM INTERROG EVL PM/LDLS PM: CPT | Performed by: INTERNAL MEDICINE

## 2023-05-04 PROCEDURE — 93296 REM INTERROG EVL PM/IDS: CPT | Performed by: INTERNAL MEDICINE

## 2023-07-07 ENCOUNTER — TELEPHONE (OUTPATIENT)
Dept: NEUROLOGY | Facility: CLINIC | Age: 68
End: 2023-07-07
Payer: COMMERCIAL

## 2023-07-07 NOTE — TELEPHONE ENCOUNTER
Called and spoke to pt in regards to DMV seizure form.  Pt states she has remained seizure free.  Pt has completed her portion of the DMV form.  Form in your in box on your desk for your completion.    Carolyn Montoya LPN on 7/7/2023 at 9:32 AM

## 2023-07-07 NOTE — TELEPHONE ENCOUNTER
Form will be faxed to DMV, pt aware.   Copy will be sent to pt as well.    Carolyn Montoya LPN on 7/7/2023 at 2:32 PM

## 2023-07-07 NOTE — TELEPHONE ENCOUNTER
DMV form filled out ready to be sent and let patient know thank you.  ольга Chilel MD on 7/7/2023 at 10:21 AM

## 2023-07-12 ENCOUNTER — LAB REQUISITION (OUTPATIENT)
Dept: LAB | Facility: CLINIC | Age: 68
End: 2023-07-12
Payer: COMMERCIAL

## 2023-07-12 DIAGNOSIS — G40.909 EPILEPSY, UNSPECIFIED, NOT INTRACTABLE, WITHOUT STATUS EPILEPTICUS (H): ICD-10-CM

## 2023-07-12 DIAGNOSIS — I10 ESSENTIAL (PRIMARY) HYPERTENSION: ICD-10-CM

## 2023-07-12 DIAGNOSIS — E03.9 HYPOTHYROIDISM, UNSPECIFIED: ICD-10-CM

## 2023-07-12 DIAGNOSIS — E78.2 MIXED HYPERLIPIDEMIA: ICD-10-CM

## 2023-07-12 LAB
BASOPHILS # BLD AUTO: 0 10E3/UL (ref 0–0.2)
BASOPHILS NFR BLD AUTO: 1 %
EOSINOPHIL # BLD AUTO: 0.3 10E3/UL (ref 0–0.7)
EOSINOPHIL NFR BLD AUTO: 5 %
ERYTHROCYTE [DISTWIDTH] IN BLOOD BY AUTOMATED COUNT: 13 % (ref 10–15)
HCT VFR BLD AUTO: 39.6 % (ref 35–47)
HGB BLD-MCNC: 13.2 G/DL (ref 11.7–15.7)
IMM GRANULOCYTES # BLD: 0 10E3/UL
IMM GRANULOCYTES NFR BLD: 0 %
LYMPHOCYTES # BLD AUTO: 2.3 10E3/UL (ref 0.8–5.3)
LYMPHOCYTES NFR BLD AUTO: 43 %
MCH RBC QN AUTO: 30.7 PG (ref 26.5–33)
MCHC RBC AUTO-ENTMCNC: 33.3 G/DL (ref 31.5–36.5)
MCV RBC AUTO: 92 FL (ref 78–100)
MONOCYTES # BLD AUTO: 0.3 10E3/UL (ref 0–1.3)
MONOCYTES NFR BLD AUTO: 6 %
NEUTROPHILS # BLD AUTO: 2.4 10E3/UL (ref 1.6–8.3)
NEUTROPHILS NFR BLD AUTO: 45 %
NRBC # BLD AUTO: 0 10E3/UL
NRBC BLD AUTO-RTO: 0 /100
PLATELET # BLD AUTO: 184 10E3/UL (ref 150–450)
RBC # BLD AUTO: 4.3 10E6/UL (ref 3.8–5.2)
WBC # BLD AUTO: 5.4 10E3/UL (ref 4–11)

## 2023-07-12 PROCEDURE — 85025 COMPLETE CBC W/AUTO DIFF WBC: CPT | Mod: ORL | Performed by: FAMILY MEDICINE

## 2023-07-12 PROCEDURE — 84443 ASSAY THYROID STIM HORMONE: CPT | Mod: ORL | Performed by: FAMILY MEDICINE

## 2023-07-12 PROCEDURE — 80053 COMPREHEN METABOLIC PANEL: CPT | Mod: ORL | Performed by: FAMILY MEDICINE

## 2023-07-12 PROCEDURE — 80185 ASSAY OF PHENYTOIN TOTAL: CPT | Mod: ORL | Performed by: FAMILY MEDICINE

## 2023-07-12 PROCEDURE — 80177 DRUG SCRN QUAN LEVETIRACETAM: CPT | Mod: ORL | Performed by: FAMILY MEDICINE

## 2023-07-12 PROCEDURE — 80061 LIPID PANEL: CPT | Mod: ORL | Performed by: FAMILY MEDICINE

## 2023-07-13 LAB
ALBUMIN SERPL BCG-MCNC: 4.5 G/DL (ref 3.5–5.2)
ALP SERPL-CCNC: 110 U/L (ref 35–104)
ALT SERPL W P-5'-P-CCNC: 19 U/L (ref 0–50)
ANION GAP SERPL CALCULATED.3IONS-SCNC: 13 MMOL/L (ref 7–15)
AST SERPL W P-5'-P-CCNC: 29 U/L (ref 0–45)
BILIRUB SERPL-MCNC: 0.5 MG/DL
BUN SERPL-MCNC: 16.7 MG/DL (ref 8–23)
CALCIUM SERPL-MCNC: 9.7 MG/DL (ref 8.8–10.2)
CHLORIDE SERPL-SCNC: 102 MMOL/L (ref 98–107)
CHOLEST SERPL-MCNC: 161 MG/DL
CREAT SERPL-MCNC: 0.77 MG/DL (ref 0.51–0.95)
DEPRECATED HCO3 PLAS-SCNC: 24 MMOL/L (ref 22–29)
GFR SERPL CREATININE-BSD FRML MDRD: 83 ML/MIN/1.73M2
GLUCOSE SERPL-MCNC: 107 MG/DL (ref 70–99)
HDLC SERPL-MCNC: 66 MG/DL
LDLC SERPL CALC-MCNC: 75 MG/DL
LEVETIRACETAM SERPL-MCNC: 8.1 ΜG/ML (ref 10–40)
NONHDLC SERPL-MCNC: 95 MG/DL
PHENYTOIN SERPL-MCNC: 11 UG/ML
POTASSIUM SERPL-SCNC: 3.2 MMOL/L (ref 3.4–5.3)
PROT SERPL-MCNC: 6.6 G/DL (ref 6.4–8.3)
SODIUM SERPL-SCNC: 139 MMOL/L (ref 136–145)
TRIGL SERPL-MCNC: 102 MG/DL
TSH SERPL DL<=0.005 MIU/L-ACNC: 0.25 UIU/ML (ref 0.3–4.2)

## 2023-08-09 ENCOUNTER — ANCILLARY PROCEDURE (OUTPATIENT)
Dept: CARDIOLOGY | Facility: CLINIC | Age: 68
End: 2023-08-09
Attending: INTERNAL MEDICINE
Payer: COMMERCIAL

## 2023-08-09 DIAGNOSIS — I49.5 SICK SINUS SYNDROME (H): ICD-10-CM

## 2023-08-09 DIAGNOSIS — Z95.0 PACEMAKER: ICD-10-CM

## 2023-08-09 LAB
MDC_IDC_EPISODE_DTM: NORMAL
MDC_IDC_EPISODE_DURATION: 0 S
MDC_IDC_EPISODE_DURATION: 1 S
MDC_IDC_EPISODE_DURATION: 2 S
MDC_IDC_EPISODE_ID: 33
MDC_IDC_EPISODE_ID: 34
MDC_IDC_EPISODE_ID: 35
MDC_IDC_EPISODE_ID: 36
MDC_IDC_EPISODE_ID: 37
MDC_IDC_EPISODE_ID: 38
MDC_IDC_EPISODE_ID: 39
MDC_IDC_EPISODE_ID: 40
MDC_IDC_EPISODE_ID: 41
MDC_IDC_EPISODE_ID: 42
MDC_IDC_EPISODE_TYPE: NORMAL
MDC_IDC_LEAD_IMPLANT_DT: NORMAL
MDC_IDC_LEAD_IMPLANT_DT: NORMAL
MDC_IDC_LEAD_LOCATION: NORMAL
MDC_IDC_LEAD_LOCATION: NORMAL
MDC_IDC_LEAD_MFG: NORMAL
MDC_IDC_LEAD_MFG: NORMAL
MDC_IDC_LEAD_MODEL: NORMAL
MDC_IDC_LEAD_MODEL: NORMAL
MDC_IDC_LEAD_POLARITY_TYPE: NORMAL
MDC_IDC_LEAD_POLARITY_TYPE: NORMAL
MDC_IDC_LEAD_SERIAL: NORMAL
MDC_IDC_LEAD_SERIAL: NORMAL
MDC_IDC_MSMT_BATTERY_DTM: NORMAL
MDC_IDC_MSMT_BATTERY_REMAINING_LONGEVITY: 99 MO
MDC_IDC_MSMT_BATTERY_RRT_TRIGGER: 2.62
MDC_IDC_MSMT_BATTERY_STATUS: NORMAL
MDC_IDC_MSMT_BATTERY_VOLTAGE: 2.98 V
MDC_IDC_MSMT_LEADCHNL_RA_IMPEDANCE_VALUE: 304 OHM
MDC_IDC_MSMT_LEADCHNL_RA_IMPEDANCE_VALUE: 342 OHM
MDC_IDC_MSMT_LEADCHNL_RA_PACING_THRESHOLD_AMPLITUDE: 0.5 V
MDC_IDC_MSMT_LEADCHNL_RA_PACING_THRESHOLD_PULSEWIDTH: 0.4 MS
MDC_IDC_MSMT_LEADCHNL_RA_SENSING_INTR_AMPL: 0.88 MV
MDC_IDC_MSMT_LEADCHNL_RA_SENSING_INTR_AMPL: 0.88 MV
MDC_IDC_MSMT_LEADCHNL_RV_IMPEDANCE_VALUE: 304 OHM
MDC_IDC_MSMT_LEADCHNL_RV_IMPEDANCE_VALUE: 361 OHM
MDC_IDC_MSMT_LEADCHNL_RV_PACING_THRESHOLD_AMPLITUDE: 1.75 V
MDC_IDC_MSMT_LEADCHNL_RV_PACING_THRESHOLD_PULSEWIDTH: 0.4 MS
MDC_IDC_MSMT_LEADCHNL_RV_SENSING_INTR_AMPL: 10.75 MV
MDC_IDC_MSMT_LEADCHNL_RV_SENSING_INTR_AMPL: 10.75 MV
MDC_IDC_PG_IMPLANT_DTM: NORMAL
MDC_IDC_PG_MFG: NORMAL
MDC_IDC_PG_MODEL: NORMAL
MDC_IDC_PG_SERIAL: NORMAL
MDC_IDC_PG_TYPE: NORMAL
MDC_IDC_SESS_CLINIC_NAME: NORMAL
MDC_IDC_SESS_DTM: NORMAL
MDC_IDC_SESS_TYPE: NORMAL
MDC_IDC_SET_BRADY_AT_MODE_SWITCH_RATE: 171 {BEATS}/MIN
MDC_IDC_SET_BRADY_HYSTRATE: NORMAL
MDC_IDC_SET_BRADY_LOWRATE: 70 {BEATS}/MIN
MDC_IDC_SET_BRADY_MAX_SENSOR_RATE: 145 {BEATS}/MIN
MDC_IDC_SET_BRADY_MAX_TRACKING_RATE: 145 {BEATS}/MIN
MDC_IDC_SET_BRADY_MODE: NORMAL
MDC_IDC_SET_BRADY_PAV_DELAY_LOW: 180 MS
MDC_IDC_SET_BRADY_SAV_DELAY_LOW: 150 MS
MDC_IDC_SET_LEADCHNL_RA_PACING_AMPLITUDE: 1.5 V
MDC_IDC_SET_LEADCHNL_RA_PACING_ANODE_ELECTRODE_1: NORMAL
MDC_IDC_SET_LEADCHNL_RA_PACING_ANODE_LOCATION_1: NORMAL
MDC_IDC_SET_LEADCHNL_RA_PACING_CAPTURE_MODE: NORMAL
MDC_IDC_SET_LEADCHNL_RA_PACING_CATHODE_ELECTRODE_1: NORMAL
MDC_IDC_SET_LEADCHNL_RA_PACING_CATHODE_LOCATION_1: NORMAL
MDC_IDC_SET_LEADCHNL_RA_PACING_POLARITY: NORMAL
MDC_IDC_SET_LEADCHNL_RA_PACING_PULSEWIDTH: 0.4 MS
MDC_IDC_SET_LEADCHNL_RA_SENSING_ANODE_ELECTRODE_1: NORMAL
MDC_IDC_SET_LEADCHNL_RA_SENSING_ANODE_LOCATION_1: NORMAL
MDC_IDC_SET_LEADCHNL_RA_SENSING_CATHODE_ELECTRODE_1: NORMAL
MDC_IDC_SET_LEADCHNL_RA_SENSING_CATHODE_LOCATION_1: NORMAL
MDC_IDC_SET_LEADCHNL_RA_SENSING_POLARITY: NORMAL
MDC_IDC_SET_LEADCHNL_RA_SENSING_SENSITIVITY: 0.6 MV
MDC_IDC_SET_LEADCHNL_RV_PACING_AMPLITUDE: 2.75 V
MDC_IDC_SET_LEADCHNL_RV_PACING_ANODE_ELECTRODE_1: NORMAL
MDC_IDC_SET_LEADCHNL_RV_PACING_ANODE_LOCATION_1: NORMAL
MDC_IDC_SET_LEADCHNL_RV_PACING_CAPTURE_MODE: NORMAL
MDC_IDC_SET_LEADCHNL_RV_PACING_CATHODE_ELECTRODE_1: NORMAL
MDC_IDC_SET_LEADCHNL_RV_PACING_CATHODE_LOCATION_1: NORMAL
MDC_IDC_SET_LEADCHNL_RV_PACING_POLARITY: NORMAL
MDC_IDC_SET_LEADCHNL_RV_PACING_PULSEWIDTH: 0.4 MS
MDC_IDC_SET_LEADCHNL_RV_SENSING_ANODE_ELECTRODE_1: NORMAL
MDC_IDC_SET_LEADCHNL_RV_SENSING_ANODE_LOCATION_1: NORMAL
MDC_IDC_SET_LEADCHNL_RV_SENSING_CATHODE_ELECTRODE_1: NORMAL
MDC_IDC_SET_LEADCHNL_RV_SENSING_CATHODE_LOCATION_1: NORMAL
MDC_IDC_SET_LEADCHNL_RV_SENSING_POLARITY: NORMAL
MDC_IDC_SET_LEADCHNL_RV_SENSING_SENSITIVITY: 0.9 MV
MDC_IDC_SET_ZONE_DETECTION_INTERVAL: 350 MS
MDC_IDC_SET_ZONE_DETECTION_INTERVAL: 400 MS
MDC_IDC_SET_ZONE_TYPE: NORMAL
MDC_IDC_STAT_AT_BURDEN_PERCENT: 0 %
MDC_IDC_STAT_AT_DTM_END: NORMAL
MDC_IDC_STAT_AT_DTM_START: NORMAL
MDC_IDC_STAT_BRADY_AP_VP_PERCENT: 29.22 %
MDC_IDC_STAT_BRADY_AP_VS_PERCENT: 69.38 %
MDC_IDC_STAT_BRADY_AS_VP_PERCENT: 0.68 %
MDC_IDC_STAT_BRADY_AS_VS_PERCENT: 0.73 %
MDC_IDC_STAT_BRADY_DTM_END: NORMAL
MDC_IDC_STAT_BRADY_DTM_START: NORMAL
MDC_IDC_STAT_BRADY_RA_PERCENT_PACED: 97.82 %
MDC_IDC_STAT_BRADY_RV_PERCENT_PACED: 29.9 %
MDC_IDC_STAT_EPISODE_RECENT_COUNT: 0
MDC_IDC_STAT_EPISODE_RECENT_COUNT: 10
MDC_IDC_STAT_EPISODE_RECENT_COUNT_DTM_END: NORMAL
MDC_IDC_STAT_EPISODE_RECENT_COUNT_DTM_START: NORMAL
MDC_IDC_STAT_EPISODE_TOTAL_COUNT: 0
MDC_IDC_STAT_EPISODE_TOTAL_COUNT: 17
MDC_IDC_STAT_EPISODE_TOTAL_COUNT: 25
MDC_IDC_STAT_EPISODE_TOTAL_COUNT_DTM_END: NORMAL
MDC_IDC_STAT_EPISODE_TOTAL_COUNT_DTM_START: NORMAL
MDC_IDC_STAT_EPISODE_TYPE: NORMAL

## 2023-08-09 PROCEDURE — 93294 REM INTERROG EVL PM/LDLS PM: CPT | Performed by: INTERNAL MEDICINE

## 2023-08-09 PROCEDURE — 93296 REM INTERROG EVL PM/IDS: CPT | Performed by: INTERNAL MEDICINE

## 2023-09-01 ENCOUNTER — TRANSFERRED RECORDS (OUTPATIENT)
Dept: HEALTH INFORMATION MANAGEMENT | Facility: CLINIC | Age: 68
End: 2023-09-01

## 2023-09-01 ENCOUNTER — LAB REQUISITION (OUTPATIENT)
Dept: LAB | Facility: CLINIC | Age: 68
End: 2023-09-01
Payer: COMMERCIAL

## 2023-09-01 DIAGNOSIS — Z12.4 ENCOUNTER FOR SCREENING FOR MALIGNANT NEOPLASM OF CERVIX: ICD-10-CM

## 2023-09-01 PROCEDURE — G0145 SCR C/V CYTO,THINLAYER,RESCR: HCPCS | Mod: ORL | Performed by: NURSE PRACTITIONER

## 2023-09-06 LAB
BKR LAB AP GYN ADEQUACY: NORMAL
BKR LAB AP GYN INTERPRETATION: NORMAL
BKR LAB AP HPV REFLEX: NORMAL
BKR LAB AP PREVIOUS ABNL DX: NORMAL
BKR LAB AP PREVIOUS ABNORMAL: NORMAL
PATH REPORT.COMMENTS IMP SPEC: NORMAL
PATH REPORT.COMMENTS IMP SPEC: NORMAL
PATH REPORT.RELEVANT HX SPEC: NORMAL

## 2023-10-29 ENCOUNTER — HEALTH MAINTENANCE LETTER (OUTPATIENT)
Age: 68
End: 2023-10-29

## 2023-11-09 ENCOUNTER — ANCILLARY PROCEDURE (OUTPATIENT)
Dept: CARDIOLOGY | Facility: CLINIC | Age: 68
End: 2023-11-09
Attending: INTERNAL MEDICINE
Payer: COMMERCIAL

## 2023-11-09 DIAGNOSIS — Z95.0 PACEMAKER: ICD-10-CM

## 2023-11-09 DIAGNOSIS — I49.5 SICK SINUS SYNDROME (H): ICD-10-CM

## 2023-11-09 LAB
MDC_IDC_EPISODE_DTM: NORMAL
MDC_IDC_EPISODE_DURATION: 0 S
MDC_IDC_EPISODE_DURATION: 0 S
MDC_IDC_EPISODE_DURATION: 1 S
MDC_IDC_EPISODE_DURATION: 2 S
MDC_IDC_EPISODE_ID: 53
MDC_IDC_EPISODE_ID: 54
MDC_IDC_EPISODE_ID: 55
MDC_IDC_EPISODE_ID: 56
MDC_IDC_EPISODE_ID: 57
MDC_IDC_EPISODE_ID: 58
MDC_IDC_EPISODE_ID: 59
MDC_IDC_EPISODE_ID: 60
MDC_IDC_EPISODE_ID: 61
MDC_IDC_EPISODE_ID: 62
MDC_IDC_EPISODE_ID: 63
MDC_IDC_EPISODE_ID: 64
MDC_IDC_EPISODE_ID: 65
MDC_IDC_EPISODE_ID: 66
MDC_IDC_EPISODE_ID: 67
MDC_IDC_EPISODE_TYPE: NORMAL
MDC_IDC_LEAD_CONNECTION_STATUS: NORMAL
MDC_IDC_LEAD_CONNECTION_STATUS: NORMAL
MDC_IDC_LEAD_IMPLANT_DT: NORMAL
MDC_IDC_LEAD_IMPLANT_DT: NORMAL
MDC_IDC_LEAD_LOCATION: NORMAL
MDC_IDC_LEAD_LOCATION: NORMAL
MDC_IDC_LEAD_MFG: NORMAL
MDC_IDC_LEAD_MFG: NORMAL
MDC_IDC_LEAD_MODEL: NORMAL
MDC_IDC_LEAD_MODEL: NORMAL
MDC_IDC_LEAD_POLARITY_TYPE: NORMAL
MDC_IDC_LEAD_POLARITY_TYPE: NORMAL
MDC_IDC_LEAD_SERIAL: NORMAL
MDC_IDC_LEAD_SERIAL: NORMAL
MDC_IDC_MSMT_BATTERY_DTM: NORMAL
MDC_IDC_MSMT_BATTERY_REMAINING_LONGEVITY: 94 MO
MDC_IDC_MSMT_BATTERY_RRT_TRIGGER: 2.62
MDC_IDC_MSMT_BATTERY_STATUS: NORMAL
MDC_IDC_MSMT_BATTERY_VOLTAGE: 2.98 V
MDC_IDC_MSMT_LEADCHNL_RA_IMPEDANCE_VALUE: 323 OHM
MDC_IDC_MSMT_LEADCHNL_RA_IMPEDANCE_VALUE: 342 OHM
MDC_IDC_MSMT_LEADCHNL_RA_PACING_THRESHOLD_AMPLITUDE: 0.5 V
MDC_IDC_MSMT_LEADCHNL_RA_PACING_THRESHOLD_PULSEWIDTH: 0.4 MS
MDC_IDC_MSMT_LEADCHNL_RA_SENSING_INTR_AMPL: 2.5 MV
MDC_IDC_MSMT_LEADCHNL_RA_SENSING_INTR_AMPL: 2.5 MV
MDC_IDC_MSMT_LEADCHNL_RV_IMPEDANCE_VALUE: 304 OHM
MDC_IDC_MSMT_LEADCHNL_RV_IMPEDANCE_VALUE: 380 OHM
MDC_IDC_MSMT_LEADCHNL_RV_PACING_THRESHOLD_AMPLITUDE: 1.88 V
MDC_IDC_MSMT_LEADCHNL_RV_PACING_THRESHOLD_PULSEWIDTH: 0.4 MS
MDC_IDC_MSMT_LEADCHNL_RV_SENSING_INTR_AMPL: 11.62 MV
MDC_IDC_MSMT_LEADCHNL_RV_SENSING_INTR_AMPL: 11.62 MV
MDC_IDC_PG_IMPLANT_DTM: NORMAL
MDC_IDC_PG_MFG: NORMAL
MDC_IDC_PG_MODEL: NORMAL
MDC_IDC_PG_SERIAL: NORMAL
MDC_IDC_PG_TYPE: NORMAL
MDC_IDC_SESS_CLINIC_NAME: NORMAL
MDC_IDC_SESS_DTM: NORMAL
MDC_IDC_SESS_TYPE: NORMAL
MDC_IDC_SET_BRADY_AT_MODE_SWITCH_RATE: 171 {BEATS}/MIN
MDC_IDC_SET_BRADY_HYSTRATE: NORMAL
MDC_IDC_SET_BRADY_LOWRATE: 70 {BEATS}/MIN
MDC_IDC_SET_BRADY_MAX_SENSOR_RATE: 145 {BEATS}/MIN
MDC_IDC_SET_BRADY_MAX_TRACKING_RATE: 145 {BEATS}/MIN
MDC_IDC_SET_BRADY_MODE: NORMAL
MDC_IDC_SET_BRADY_PAV_DELAY_LOW: 180 MS
MDC_IDC_SET_BRADY_SAV_DELAY_LOW: 150 MS
MDC_IDC_SET_LEADCHNL_RA_PACING_AMPLITUDE: 1.5 V
MDC_IDC_SET_LEADCHNL_RA_PACING_ANODE_ELECTRODE_1: NORMAL
MDC_IDC_SET_LEADCHNL_RA_PACING_ANODE_LOCATION_1: NORMAL
MDC_IDC_SET_LEADCHNL_RA_PACING_CAPTURE_MODE: NORMAL
MDC_IDC_SET_LEADCHNL_RA_PACING_CATHODE_ELECTRODE_1: NORMAL
MDC_IDC_SET_LEADCHNL_RA_PACING_CATHODE_LOCATION_1: NORMAL
MDC_IDC_SET_LEADCHNL_RA_PACING_POLARITY: NORMAL
MDC_IDC_SET_LEADCHNL_RA_PACING_PULSEWIDTH: 0.4 MS
MDC_IDC_SET_LEADCHNL_RA_SENSING_ANODE_ELECTRODE_1: NORMAL
MDC_IDC_SET_LEADCHNL_RA_SENSING_ANODE_LOCATION_1: NORMAL
MDC_IDC_SET_LEADCHNL_RA_SENSING_CATHODE_ELECTRODE_1: NORMAL
MDC_IDC_SET_LEADCHNL_RA_SENSING_CATHODE_LOCATION_1: NORMAL
MDC_IDC_SET_LEADCHNL_RA_SENSING_POLARITY: NORMAL
MDC_IDC_SET_LEADCHNL_RA_SENSING_SENSITIVITY: 0.6 MV
MDC_IDC_SET_LEADCHNL_RV_PACING_AMPLITUDE: 2.75 V
MDC_IDC_SET_LEADCHNL_RV_PACING_ANODE_ELECTRODE_1: NORMAL
MDC_IDC_SET_LEADCHNL_RV_PACING_ANODE_LOCATION_1: NORMAL
MDC_IDC_SET_LEADCHNL_RV_PACING_CAPTURE_MODE: NORMAL
MDC_IDC_SET_LEADCHNL_RV_PACING_CATHODE_ELECTRODE_1: NORMAL
MDC_IDC_SET_LEADCHNL_RV_PACING_CATHODE_LOCATION_1: NORMAL
MDC_IDC_SET_LEADCHNL_RV_PACING_POLARITY: NORMAL
MDC_IDC_SET_LEADCHNL_RV_PACING_PULSEWIDTH: 0.4 MS
MDC_IDC_SET_LEADCHNL_RV_SENSING_ANODE_ELECTRODE_1: NORMAL
MDC_IDC_SET_LEADCHNL_RV_SENSING_ANODE_LOCATION_1: NORMAL
MDC_IDC_SET_LEADCHNL_RV_SENSING_CATHODE_ELECTRODE_1: NORMAL
MDC_IDC_SET_LEADCHNL_RV_SENSING_CATHODE_LOCATION_1: NORMAL
MDC_IDC_SET_LEADCHNL_RV_SENSING_POLARITY: NORMAL
MDC_IDC_SET_LEADCHNL_RV_SENSING_SENSITIVITY: 0.9 MV
MDC_IDC_SET_ZONE_DETECTION_INTERVAL: 350 MS
MDC_IDC_SET_ZONE_DETECTION_INTERVAL: 400 MS
MDC_IDC_SET_ZONE_STATUS: NORMAL
MDC_IDC_SET_ZONE_STATUS: NORMAL
MDC_IDC_SET_ZONE_TYPE: NORMAL
MDC_IDC_SET_ZONE_VENDOR_TYPE: NORMAL
MDC_IDC_STAT_AT_BURDEN_PERCENT: 0 %
MDC_IDC_STAT_AT_DTM_END: NORMAL
MDC_IDC_STAT_AT_DTM_START: NORMAL
MDC_IDC_STAT_BRADY_AP_VP_PERCENT: 16.31 %
MDC_IDC_STAT_BRADY_AP_VS_PERCENT: 83.11 %
MDC_IDC_STAT_BRADY_AS_VP_PERCENT: 0.13 %
MDC_IDC_STAT_BRADY_AS_VS_PERCENT: 0.45 %
MDC_IDC_STAT_BRADY_DTM_END: NORMAL
MDC_IDC_STAT_BRADY_DTM_START: NORMAL
MDC_IDC_STAT_BRADY_RA_PERCENT_PACED: 99.21 %
MDC_IDC_STAT_BRADY_RV_PERCENT_PACED: 16.44 %
MDC_IDC_STAT_EPISODE_RECENT_COUNT: 0
MDC_IDC_STAT_EPISODE_RECENT_COUNT: 25
MDC_IDC_STAT_EPISODE_RECENT_COUNT_DTM_END: NORMAL
MDC_IDC_STAT_EPISODE_RECENT_COUNT_DTM_START: NORMAL
MDC_IDC_STAT_EPISODE_TOTAL_COUNT: 0
MDC_IDC_STAT_EPISODE_TOTAL_COUNT: 17
MDC_IDC_STAT_EPISODE_TOTAL_COUNT: 50
MDC_IDC_STAT_EPISODE_TOTAL_COUNT_DTM_END: NORMAL
MDC_IDC_STAT_EPISODE_TOTAL_COUNT_DTM_START: NORMAL
MDC_IDC_STAT_EPISODE_TYPE: NORMAL
MDC_IDC_STAT_TACHYTHERAPY_RECENT_DTM_END: NORMAL
MDC_IDC_STAT_TACHYTHERAPY_RECENT_DTM_START: NORMAL
MDC_IDC_STAT_TACHYTHERAPY_TOTAL_DTM_END: NORMAL
MDC_IDC_STAT_TACHYTHERAPY_TOTAL_DTM_START: NORMAL

## 2023-11-09 PROCEDURE — 93294 REM INTERROG EVL PM/LDLS PM: CPT | Performed by: INTERNAL MEDICINE

## 2023-11-09 PROCEDURE — 93296 REM INTERROG EVL PM/IDS: CPT | Performed by: INTERNAL MEDICINE

## 2023-11-28 ENCOUNTER — TRANSFERRED RECORDS (OUTPATIENT)
Dept: HEALTH INFORMATION MANAGEMENT | Facility: CLINIC | Age: 68
End: 2023-11-28
Payer: COMMERCIAL

## 2024-01-03 NOTE — PROGRESS NOTES
HEART CARE ENCOUNTER NOTE      [facility]  [phone]      Assessment/Recommendations   Assessment:   Sick sinus syndrome: Left bundle branch block status post pacemaker placement 1993 with Medtronic device generator change 2008.  Device check today shows 1 episode of atrial high rates with an EGM suggesting AT/AF lasting 2 minutes.  Patient was asymptomatic with this.  Hypertension: Well-controlled on hydrochlorothiazide 25 mg daily  Hypercholesterolemia: Well-controlled on rosuvastatin 10 mg daily.  Last lipids 7/12/2023 showed LDL of 75.  Obstructive sleep apnea: Compliant on CPAP  SVT: On previous device checks have a 6 beat run which patient was asymptomatic for.  On device check today there is 45 ventricular high rate episodes logged with EGM's suggesting SVT ranging from 150 to 170 bpm.  Patient was asymptomatic with these.      Plan:   BMP today as last labs drawn in July 2023 had shown potassium of 3.2.  Continue current medications  Continue with device checks- if having more episodes of AT/AF or lasting longer with true A-fib patient will need to be anticoagulated.  Follow-up with Dr. Jose A cabrera in 1 year, sooner if needed        The level of medical decision making during this visit was of moderate complexity.    Follow up in 1 year with Dr. Wick.     History of Present Illness/Subjective    HPI: Chaya Bean is a 68 year old female with PMHx of sick sinus syndrome and left bundle branch block status post permanent pacemaker placement 1993 with generator change 2008, hypertension, hypercholesterolemia, sleep apnea presents for follow-up.    Patient notes she is fairly active walking often.  Prior to November 2023 she was walking 3 to 4 miles a day.  Noted she got influenza and is still recovering.  Notes her fatigue and energy level is slowly improving though.  She still is walking just not as far.  Denies any exertional chest pain or shortness of breath.  Notes her back will  start hurting and that is why she has to stop.  Denies any lightheadedness or palpitations.  She denies lightheadedness, shortness of breath, dyspnea on exertion, orthopnea, PND, palpitations, chest pain, abdominal fullness/bloating, and lower extremity edema.        Device check 1/4/2024:  Encounter Type: Patient seen in clinic for annual device evaluation and iterative programming, followed by Justa Manrique NP  Device: Medtronic Neena (D) pacemaker  Pacing %/Programmed: AP 98%,  20.5%, AAIR-DDDR 70-145bpm  Lead(s): Stable  Battery longevity: Estimating 7.8 years remaining  Presenting rhythm: APVS 80bpm  Underlying rhythm: No P's or R's @ VVI 30bpm, conducts w/AP  Heart rates: Stable, HR's per histogram range 70-160bpm and primarily 70-100bpm  Atrial High rates: 1 mode switch logged 4/14/23, EGM suggests AT/AF lasting ~ 2 mins, burden <0.1%, v-rate >/=120bpm ~ 5%, patient denies symptoms.  Anticoagulant: None  Ventricular High rates: 45 VHR episodes logged, available EGM's suggest -170bpm, patient denies symptoms.  Comments: Normal device function. No programming changes.  Plan: Remote device check scheduled for 4/4/24. Katarzyna Chatman RN    Echocardiogram 3/1/2015 results:   Normal left ventricular size and systolic function.   Left ventricular ejection fraction is visually estimated to be 60%.   Mild concentric left ventricular hypertrophy.   Pacemaker lead noted in the right atrium and right ventricle.   No significant valvular abnormalities.     Physical Examination  Review of Systems   Vitals: /68 (BP Location: Left arm, Patient Position: Sitting, Cuff Size: Adult Regular)   Pulse 71   Resp 16   Wt 71.4 kg (157 lb 6.4 oz)   SpO2 97%   BMI 25.02 kg/m    BMI= Body mass index is 25.02 kg/m .  Wt Readings from Last 3 Encounters:   01/04/24 71.4 kg (157 lb 6.4 oz)   04/11/23 80.3 kg (177 lb)   01/30/23 80.7 kg (178 lb)           ENT/Mouth: membranes moist, no oral lesions or bleeding gums.       EYES:  no scleral icterus, normal conjunctivae                    Neck: No carotid bruit   Chest/Lungs:   lungs are clear to auscultation, no rales or wheezing,  equal chest wall expansion    Cardiovascular:   Regular. Normal first and second heart sounds with no murmurs, rubs, or gallops; the carotid, radial pulses are intact, no edema bilaterally        Extremities: no cyanosis or clubbing   Skin: no xanthelasma, warm.    Neurologic: no tremors     Psychiatric: alert and oriented x3, calm        Please refer above for cardiac ROS details.        Medical History  Surgical History Family History Social History   Past Medical History:   Diagnosis Date    Dyslipidemia     Hypertension     Hypothyroid     Pacemaker     Post herpetic neuralgia     Sick sinus syndrome (H)     Sleep apnea     Syncope     Third degree AV block (H)      Past Surgical History:   Procedure Laterality Date    ABDOMEN SURGERY      BACK SURGERY      IMPLANT PACEMAKER      OOPHORECTOMY Left 12 yrs ago     Family History   Problem Relation Age of Onset    Diabetes Mother     Heart Disease Mother     Hypertension Mother     Cerebrovascular Disease Mother     Hereditary Breast and Ovarian Cancer Syndrome No family hx of     Breast Cancer No family hx of     Cancer No family hx of     Colon Cancer No family hx of     Endometrial Cancer No family hx of     Ovarian Cancer No family hx of         Social History     Socioeconomic History    Marital status:      Spouse name: Not on file    Number of children: Not on file    Years of education: Not on file    Highest education level: Not on file   Occupational History    Not on file   Tobacco Use    Smoking status: Never    Smokeless tobacco: Never   Substance and Sexual Activity    Alcohol use: No    Drug use: No    Sexual activity: Not on file   Other Topics Concern    Not on file   Social History Narrative    Not on file     Social Determinants of Health     Financial Resource Strain: Not on  "file   Food Insecurity: Not on file   Transportation Needs: Not on file   Physical Activity: Not on file   Stress: Not on file   Social Connections: Not on file   Interpersonal Safety: Not on file   Housing Stability: Not on file           Medications  Allergies   Current Outpatient Medications   Medication Sig Dispense Refill    aspirin 81 MG EC tablet [ASPIRIN 81 MG EC TABLET] Take 81 mg by mouth daily.      citalopram (CELEXA) 40 MG tablet [CITALOPRAM (CELEXA) 40 MG TABLET] Take 60 mg by mouth daily.      digoxin (LANOXIN) 250 mcg tablet [DIGOXIN (LANOXIN) 250 MCG TABLET] Take 250 mcg by mouth every morning.      hydrochlorothiazide (HYDRODIURIL) 25 MG tablet [HYDROCHLOROTHIAZIDE (HYDRODIURIL) 25 MG TABLET] Take 25 mg by mouth daily.      levETIRAcetam (KEPPRA) 250 MG tablet Take 1 tablet (250 mg) by mouth 2 times daily 180 tablet 11    levothyroxine (SYNTHROID/LEVOTHROID) 137 MCG tablet       phenytoin (DILANTIN) 100 MG ER capsule TAKE 1 CAPSULE BY MOUTH EVERY MORNING AND 2 CAPSULES AT BEDTIME 270 capsule 3    potassium chloride (MICRO-K) 10 mEq CR capsule [POTASSIUM CHLORIDE (MICRO-K) 10 MEQ CR CAPSULE] Take 10 mEq by mouth 2 (two) times a day.             rosuvastatin (CRESTOR) 10 MG tablet 1 tablet Orally Once a day for cholesterol;         Allergies   Allergen Reactions    Amoxicillin Diarrhea    Adhesive [Cyanoacrylate] Rash          Lab Results    Chemistry/lipid CBC Cardiac Enzymes/BNP/TSH/INR   Recent Labs   Lab Test 07/12/23  1407   CHOL 161   HDL 66   LDL 75   TRIG 102     Recent Labs   Lab Test 07/12/23  1407 03/29/23  0928 10/20/22  1058   LDL 75 144* 154*     Recent Labs   Lab Test 07/12/23  1407      POTASSIUM 3.2*   CHLORIDE 102   CO2 24   *   BUN 16.7   CR 0.77   GFRESTIMATED 83   DAIANA 9.7     Recent Labs   Lab Test 07/12/23  1407 04/11/23  1154 10/20/22  1058   CR 0.77 0.67 0.81     No results for input(s): \"A1C\" in the last 52557 hours.       Recent Labs   Lab Test 07/12/23  1407 " "  WBC 5.4   HGB 13.2   HCT 39.6   MCV 92        Recent Labs   Lab Test 07/12/23  1407 04/11/23  1154 04/04/22  1137   HGB 13.2 13.9 14.5    No results for input(s): \"TROPONINI\" in the last 57644 hours.  No results for input(s): \"BNP\", \"NTBNPI\", \"NTBNP\" in the last 58343 hours.  Recent Labs   Lab Test 07/12/23  1407   TSH 0.25*     No results for input(s): \"INR\" in the last 94469 hours.     Justa Manrique PA-C                                       "

## 2024-01-04 ENCOUNTER — ANCILLARY PROCEDURE (OUTPATIENT)
Dept: CARDIOLOGY | Facility: CLINIC | Age: 69
End: 2024-01-04
Attending: INTERNAL MEDICINE
Payer: COMMERCIAL

## 2024-01-04 VITALS
SYSTOLIC BLOOD PRESSURE: 105 MMHG | BODY MASS INDEX: 25.02 KG/M2 | RESPIRATION RATE: 16 BRPM | DIASTOLIC BLOOD PRESSURE: 68 MMHG | HEART RATE: 71 BPM | OXYGEN SATURATION: 97 % | WEIGHT: 157.4 LBS

## 2024-01-04 DIAGNOSIS — I44.2 COMPLETE ATRIOVENTRICULAR BLOCK (H): ICD-10-CM

## 2024-01-04 DIAGNOSIS — I49.5 SSS (SICK SINUS SYNDROME) (H): ICD-10-CM

## 2024-01-04 DIAGNOSIS — I47.10 SVT (SUPRAVENTRICULAR TACHYCARDIA) (H): ICD-10-CM

## 2024-01-04 DIAGNOSIS — Z95.0 CARDIAC PACEMAKER IN SITU: ICD-10-CM

## 2024-01-04 DIAGNOSIS — G47.33 OBSTRUCTIVE SLEEP APNEA (ADULT) (PEDIATRIC): ICD-10-CM

## 2024-01-04 DIAGNOSIS — I10 BENIGN ESSENTIAL HYPERTENSION: Primary | ICD-10-CM

## 2024-01-04 DIAGNOSIS — I49.5 SICK SINUS SYNDROME (H): ICD-10-CM

## 2024-01-04 DIAGNOSIS — E78.00 HYPERCHOLESTEREMIA: ICD-10-CM

## 2024-01-04 LAB
ANION GAP SERPL CALCULATED.3IONS-SCNC: 10 MMOL/L (ref 7–15)
BUN SERPL-MCNC: 15.6 MG/DL (ref 8–23)
CALCIUM SERPL-MCNC: 10.1 MG/DL (ref 8.8–10.2)
CHLORIDE SERPL-SCNC: 100 MMOL/L (ref 98–107)
CREAT SERPL-MCNC: 0.75 MG/DL (ref 0.51–0.95)
DEPRECATED HCO3 PLAS-SCNC: 29 MMOL/L (ref 22–29)
EGFRCR SERPLBLD CKD-EPI 2021: 86 ML/MIN/1.73M2
GLUCOSE SERPL-MCNC: 99 MG/DL (ref 70–99)
POTASSIUM SERPL-SCNC: 3.3 MMOL/L (ref 3.4–5.3)
SODIUM SERPL-SCNC: 139 MMOL/L (ref 135–145)

## 2024-01-04 PROCEDURE — 99214 OFFICE O/P EST MOD 30 MIN: CPT | Performed by: STUDENT IN AN ORGANIZED HEALTH CARE EDUCATION/TRAINING PROGRAM

## 2024-01-04 PROCEDURE — 80048 BASIC METABOLIC PNL TOTAL CA: CPT | Performed by: STUDENT IN AN ORGANIZED HEALTH CARE EDUCATION/TRAINING PROGRAM

## 2024-01-04 PROCEDURE — 36415 COLL VENOUS BLD VENIPUNCTURE: CPT | Performed by: STUDENT IN AN ORGANIZED HEALTH CARE EDUCATION/TRAINING PROGRAM

## 2024-01-04 NOTE — LETTER
1/4/2024    Jai Proctor MD  [address]  [place] [address]    RE: Chaya Bean       Dear Colleague,     I had the pleasure of seeing Chaya Bean in the [facility].    HEART CARE ENCOUNTER NOTE      [facility]  [phone]      Assessment/Recommendations   Assessment:   Sick sinus syndrome: Left bundle branch block status post pacemaker placement 1993 with Medtronic device generator change 2008.  Device check today shows 1 episode of atrial high rates with an EGM suggesting AT/AF lasting 2 minutes.  Patient was asymptomatic with this.  Hypertension: Well-controlled on hydrochlorothiazide 25 mg daily  Hypercholesterolemia: Well-controlled on rosuvastatin 10 mg daily.  Last lipids 7/12/2023 showed LDL of 75.  Obstructive sleep apnea: Compliant on CPAP  SVT: On previous device checks have a 6 beat run which patient was asymptomatic for.  On device check today there is 45 ventricular high rate episodes logged with EGM's suggesting SVT ranging from 150 to 170 bpm.  Patient was asymptomatic with these.      Plan:   BMP today as last labs drawn in July 2023 had shown potassium of 3.2.  Continue current medications  Continue with device checks- if having more episodes of AT/AF or lasting longer with true A-fib patient will need to be anticoagulated.  Follow-up with Dr. Jose A cabrera in 1 year, sooner if needed        The level of medical decision making during this visit was of moderate complexity.    Follow up in 1 year with Dr. Wick.     History of Present Illness/Subjective    HPI: Chaya Bean is a 68 year old female with PMHx of sick sinus syndrome and left bundle branch block status post permanent pacemaker placement 1993 with generator change 2008, hypertension, hypercholesterolemia, sleep apnea presents for follow-up.    Patient notes she is fairly active walking often.  Prior to November 2023 she was walking 3 to 4 miles a day.  Noted she got  influenza and is still recovering.  Notes her fatigue and energy level is slowly improving though.  She still is walking just not as far.  Denies any exertional chest pain or shortness of breath.  Notes her back will start hurting and that is why she has to stop.  Denies any lightheadedness or palpitations.  She denies lightheadedness, shortness of breath, dyspnea on exertion, orthopnea, PND, palpitations, chest pain, abdominal fullness/bloating, and lower extremity edema.        Device check 1/4/2024:  Encounter Type: Patient seen in clinic for annual device evaluation and iterative programming, followed by Justa Manrique NP  Device: Medtronic Neena (D) pacemaker  Pacing %/Programmed: AP 98%,  20.5%, AAIR-DDDR 70-145bpm  Lead(s): Stable  Battery longevity: Estimating 7.8 years remaining  Presenting rhythm: APVS 80bpm  Underlying rhythm: No P's or R's @ VVI 30bpm, conducts w/AP  Heart rates: Stable, HR's per histogram range 70-160bpm and primarily 70-100bpm  Atrial High rates: 1 mode switch logged 4/14/23, EGM suggests AT/AF lasting ~ 2 mins, burden <0.1%, v-rate >/=120bpm ~ 5%, patient denies symptoms.  Anticoagulant: None  Ventricular High rates: 45 VHR episodes logged, available EGM's suggest -170bpm, patient denies symptoms.  Comments: Normal device function. No programming changes.  Plan: Remote device check scheduled for 4/4/24. Katarzyna Chatman RN    Echocardiogram 3/1/2015 results:   Normal left ventricular size and systolic function.   Left ventricular ejection fraction is visually estimated to be 60%.   Mild concentric left ventricular hypertrophy.   Pacemaker lead noted in the right atrium and right ventricle.   No significant valvular abnormalities.     Physical Examination  Review of Systems   Vitals: /68 (BP Location: Left arm, Patient Position: Sitting, Cuff Size: Adult Regular)   Pulse 71   Resp 16   Wt 71.4 kg (157 lb 6.4 oz)   SpO2 97%   BMI 25.02 kg/m    BMI= Body mass index is  25.02 kg/m .  Wt Readings from Last 3 Encounters:   01/04/24 71.4 kg (157 lb 6.4 oz)   04/11/23 80.3 kg (177 lb)   01/30/23 80.7 kg (178 lb)           ENT/Mouth: membranes moist, no oral lesions or bleeding gums.      EYES:  no scleral icterus, normal conjunctivae                    Neck: No carotid bruit   Chest/Lungs:   lungs are clear to auscultation, no rales or wheezing,  equal chest wall expansion    Cardiovascular:   Regular. Normal first and second heart sounds with no murmurs, rubs, or gallops; the carotid, radial pulses are intact, no edema bilaterally        Extremities: no cyanosis or clubbing   Skin: no xanthelasma, warm.    Neurologic: no tremors     Psychiatric: alert and oriented x3, calm        Please refer above for cardiac ROS details.        Medical History  Surgical History Family History Social History   Past Medical History:   Diagnosis Date    Dyslipidemia     Hypertension     Hypothyroid     Pacemaker     Post herpetic neuralgia     Sick sinus syndrome (H)     Sleep apnea     Syncope     Third degree AV block (H)      Past Surgical History:   Procedure Laterality Date    ABDOMEN SURGERY      BACK SURGERY      IMPLANT PACEMAKER      OOPHORECTOMY Left 12 yrs ago     Family History   Problem Relation Age of Onset    Diabetes Mother     Heart Disease Mother     Hypertension Mother     Cerebrovascular Disease Mother     Hereditary Breast and Ovarian Cancer Syndrome No family hx of     Breast Cancer No family hx of     Cancer No family hx of     Colon Cancer No family hx of     Endometrial Cancer No family hx of     Ovarian Cancer No family hx of         Social History     Socioeconomic History    Marital status:      Spouse name: Not on file    Number of children: Not on file    Years of education: Not on file    Highest education level: Not on file   Occupational History    Not on file   Tobacco Use    Smoking status: Never    Smokeless tobacco: Never   Substance and Sexual Activity     Alcohol use: No    Drug use: No    Sexual activity: Not on file   Other Topics Concern    Not on file   Social History Narrative    Not on file     Social Determinants of Health     Financial Resource Strain: Not on file   Food Insecurity: Not on file   Transportation Needs: Not on file   Physical Activity: Not on file   Stress: Not on file   Social Connections: Not on file   Interpersonal Safety: Not on file   Housing Stability: Not on file           Medications  Allergies   Current Outpatient Medications   Medication Sig Dispense Refill    aspirin 81 MG EC tablet [ASPIRIN 81 MG EC TABLET] Take 81 mg by mouth daily.      citalopram (CELEXA) 40 MG tablet [CITALOPRAM (CELEXA) 40 MG TABLET] Take 60 mg by mouth daily.      digoxin (LANOXIN) 250 mcg tablet [DIGOXIN (LANOXIN) 250 MCG TABLET] Take 250 mcg by mouth every morning.      hydrochlorothiazide (HYDRODIURIL) 25 MG tablet [HYDROCHLOROTHIAZIDE (HYDRODIURIL) 25 MG TABLET] Take 25 mg by mouth daily.      levETIRAcetam (KEPPRA) 250 MG tablet Take 1 tablet (250 mg) by mouth 2 times daily 180 tablet 11    levothyroxine (SYNTHROID/LEVOTHROID) 137 MCG tablet       phenytoin (DILANTIN) 100 MG ER capsule TAKE 1 CAPSULE BY MOUTH EVERY MORNING AND 2 CAPSULES AT BEDTIME 270 capsule 3    potassium chloride (MICRO-K) 10 mEq CR capsule [POTASSIUM CHLORIDE (MICRO-K) 10 MEQ CR CAPSULE] Take 10 mEq by mouth 2 (two) times a day.             rosuvastatin (CRESTOR) 10 MG tablet 1 tablet Orally Once a day for cholesterol;         Allergies   Allergen Reactions    Amoxicillin Diarrhea    Adhesive [Cyanoacrylate] Rash          Lab Results    Chemistry/lipid CBC Cardiac Enzymes/BNP/TSH/INR   Recent Labs   Lab Test 07/12/23  1407   CHOL 161   HDL 66   LDL 75   TRIG 102     Recent Labs   Lab Test 07/12/23  1407 03/29/23  0928 10/20/22  1058   LDL 75 144* 154*     Recent Labs   Lab Test 07/12/23  1407      POTASSIUM 3.2*   CHLORIDE 102   CO2 24   *   BUN 16.7   CR 0.77  "  GFRESTIMATED 83   DAIANA 9.7     Recent Labs   Lab Test 07/12/23  1407 04/11/23  1154 10/20/22  1058   CR 0.77 0.67 0.81     No results for input(s): \"A1C\" in the last 25614 hours.       Recent Labs   Lab Test 07/12/23  1407   WBC 5.4   HGB 13.2   HCT 39.6   MCV 92        Recent Labs   Lab Test 07/12/23  1407 04/11/23  1154 04/04/22  1137   HGB 13.2 13.9 14.5    No results for input(s): \"TROPONINI\" in the last 25547 hours.  No results for input(s): \"BNP\", \"NTBNPI\", \"NTBNP\" in the last 37143 hours.  Recent Labs   Lab Test 07/12/23  1407   TSH 0.25*     No results for input(s): \"INR\" in the last 62069 hours.     Justa Manrique PA-C          Thank you for allowing me to participate in the care of your patient.      Sincerely,     Justa Manrique PA-C     [facility]  cc:   Tad Thomas MD  [address]  [place] [address]      "

## 2024-01-04 NOTE — PATIENT INSTRUCTIONS
Chaya Bean,    It was a pleasure to see you today at the [facility].     My recommendations after this visit include:    - Continue current medications.   - BMP today  - Continue with device checks  - Follow up with Dr. Wick in 1 year, sooner if needed    - Please call [phone], if you have any questions or concerns      Justa Manrique PA-C'

## 2024-01-05 DIAGNOSIS — E87.6 HYPOKALEMIA: Primary | ICD-10-CM

## 2024-01-05 RX ORDER — POTASSIUM CHLORIDE 750 MG/1
20 CAPSULE, EXTENDED RELEASE ORAL 2 TIMES DAILY
Qty: 120 CAPSULE | Refills: 1 | Status: SHIPPED | OUTPATIENT
Start: 2024-01-05 | End: 2024-05-02

## 2024-01-05 NOTE — PROGRESS NOTES
Justa Manrique PA-C  1/5/2024  9:47 AM CST Back to Top      Correct 20 BID    My Everett RN  1/5/2024  9:33 AM CST       Written by Justa Manrique PA-C on 1/5/2024  8:03 AM CST View Full Comments  Seen by patient Chaya Bean on 1/5/2024  8:04 AM        ==Order placed for potassium lab in 2 weeks. Called patient and clarified her potassium supplement. She is currently taking 10 meq twice daily. She was instructed to double to 20 meq but will update Justa on this and she states she will need a refill. She will use outpatient lab at [facility] in 2 weeks. Hours provided and she can just walk in without an appointment for a potassium lab draw-[facility] Chaya Marr confirms she is on potassium supplement 10 meq but she takes it BID. Go to 20 meq BID, correct?  Thanks!  Mal           ==medication refilled at new dosage. -[facility]

## 2024-01-05 NOTE — PROGRESS NOTES
Justa Manrique PA-C  1/5/2024  8:03 AM CST Back to Top      [employer] message sent. Potassium in 2 weeks.           Potassium lab ordered in 2 weeks. -[facility]

## 2024-01-07 ENCOUNTER — HEALTH MAINTENANCE LETTER (OUTPATIENT)
Age: 69
End: 2024-01-07

## 2024-01-29 ENCOUNTER — LAB (OUTPATIENT)
Dept: LAB | Facility: HOSPITAL | Age: 69
End: 2024-01-29
Payer: COMMERCIAL

## 2024-01-29 DIAGNOSIS — E87.6 HYPOKALEMIA: ICD-10-CM

## 2024-01-29 LAB — POTASSIUM SERPL-SCNC: 3.9 MMOL/L (ref 3.4–5.3)

## 2024-01-29 PROCEDURE — 84132 ASSAY OF SERUM POTASSIUM: CPT

## 2024-01-29 PROCEDURE — 36415 COLL VENOUS BLD VENIPUNCTURE: CPT

## 2024-02-02 LAB
MDC_IDC_EPISODE_DTM: NORMAL
MDC_IDC_EPISODE_DURATION: 3 S
MDC_IDC_EPISODE_ID: 68
MDC_IDC_EPISODE_TYPE: NORMAL
MDC_IDC_EPISODE_TYPE_INDUCED: NO
MDC_IDC_LEAD_CONNECTION_STATUS: NORMAL
MDC_IDC_LEAD_CONNECTION_STATUS: NORMAL
MDC_IDC_LEAD_IMPLANT_DT: NORMAL
MDC_IDC_LEAD_IMPLANT_DT: NORMAL
MDC_IDC_LEAD_LOCATION: NORMAL
MDC_IDC_LEAD_LOCATION: NORMAL
MDC_IDC_LEAD_MFG: NORMAL
MDC_IDC_LEAD_MFG: NORMAL
MDC_IDC_LEAD_MODEL: NORMAL
MDC_IDC_LEAD_MODEL: NORMAL
MDC_IDC_LEAD_POLARITY_TYPE: NORMAL
MDC_IDC_LEAD_POLARITY_TYPE: NORMAL
MDC_IDC_LEAD_SERIAL: NORMAL
MDC_IDC_LEAD_SERIAL: NORMAL
MDC_IDC_MSMT_BATTERY_DTM: NORMAL
MDC_IDC_MSMT_BATTERY_REMAINING_LONGEVITY: 96 MO
MDC_IDC_MSMT_BATTERY_RRT_TRIGGER: 2.62
MDC_IDC_MSMT_BATTERY_STATUS: NORMAL
MDC_IDC_MSMT_BATTERY_VOLTAGE: 2.98 V
MDC_IDC_MSMT_LEADCHNL_RA_IMPEDANCE_VALUE: 380 OHM
MDC_IDC_MSMT_LEADCHNL_RA_IMPEDANCE_VALUE: 418 OHM
MDC_IDC_MSMT_LEADCHNL_RA_PACING_THRESHOLD_AMPLITUDE: 0.5 V
MDC_IDC_MSMT_LEADCHNL_RA_PACING_THRESHOLD_PULSEWIDTH: 0.4 MS
MDC_IDC_MSMT_LEADCHNL_RV_IMPEDANCE_VALUE: 342 OHM
MDC_IDC_MSMT_LEADCHNL_RV_IMPEDANCE_VALUE: 399 OHM
MDC_IDC_MSMT_LEADCHNL_RV_PACING_THRESHOLD_AMPLITUDE: 1.75 V
MDC_IDC_MSMT_LEADCHNL_RV_PACING_THRESHOLD_PULSEWIDTH: 0.4 MS
MDC_IDC_MSMT_LEADCHNL_RV_SENSING_INTR_AMPL: 10.88 MV
MDC_IDC_MSMT_LEADCHNL_RV_SENSING_INTR_AMPL: 11.8 MV
MDC_IDC_PG_IMPLANT_DTM: NORMAL
MDC_IDC_PG_MFG: NORMAL
MDC_IDC_PG_MODEL: NORMAL
MDC_IDC_PG_SERIAL: NORMAL
MDC_IDC_PG_TYPE: NORMAL
MDC_IDC_SESS_CLINIC_NAME: NORMAL
MDC_IDC_SESS_DTM: NORMAL
MDC_IDC_SESS_TYPE: NORMAL
MDC_IDC_SET_BRADY_AT_MODE_SWITCH_RATE: 171 {BEATS}/MIN
MDC_IDC_SET_BRADY_HYSTRATE: NORMAL
MDC_IDC_SET_BRADY_LOWRATE: 70 {BEATS}/MIN
MDC_IDC_SET_BRADY_MAX_SENSOR_RATE: 145 {BEATS}/MIN
MDC_IDC_SET_BRADY_MAX_TRACKING_RATE: 145 {BEATS}/MIN
MDC_IDC_SET_BRADY_MODE: NORMAL
MDC_IDC_SET_BRADY_PAV_DELAY_LOW: 180 MS
MDC_IDC_SET_BRADY_SAV_DELAY_LOW: 150 MS
MDC_IDC_SET_LEADCHNL_RA_PACING_AMPLITUDE: NORMAL
MDC_IDC_SET_LEADCHNL_RA_PACING_ANODE_ELECTRODE_1: NORMAL
MDC_IDC_SET_LEADCHNL_RA_PACING_ANODE_LOCATION_1: NORMAL
MDC_IDC_SET_LEADCHNL_RA_PACING_CAPTURE_MODE: NORMAL
MDC_IDC_SET_LEADCHNL_RA_PACING_CATHODE_ELECTRODE_1: NORMAL
MDC_IDC_SET_LEADCHNL_RA_PACING_CATHODE_LOCATION_1: NORMAL
MDC_IDC_SET_LEADCHNL_RA_PACING_POLARITY: NORMAL
MDC_IDC_SET_LEADCHNL_RA_PACING_PULSEWIDTH: 0.4 MS
MDC_IDC_SET_LEADCHNL_RA_SENSING_ANODE_ELECTRODE_1: NORMAL
MDC_IDC_SET_LEADCHNL_RA_SENSING_ANODE_LOCATION_1: NORMAL
MDC_IDC_SET_LEADCHNL_RA_SENSING_CATHODE_ELECTRODE_1: NORMAL
MDC_IDC_SET_LEADCHNL_RA_SENSING_CATHODE_LOCATION_1: NORMAL
MDC_IDC_SET_LEADCHNL_RA_SENSING_POLARITY: NORMAL
MDC_IDC_SET_LEADCHNL_RA_SENSING_SENSITIVITY: 0.6 MV
MDC_IDC_SET_LEADCHNL_RV_PACING_AMPLITUDE: NORMAL
MDC_IDC_SET_LEADCHNL_RV_PACING_ANODE_ELECTRODE_1: NORMAL
MDC_IDC_SET_LEADCHNL_RV_PACING_ANODE_LOCATION_1: NORMAL
MDC_IDC_SET_LEADCHNL_RV_PACING_CAPTURE_MODE: NORMAL
MDC_IDC_SET_LEADCHNL_RV_PACING_CATHODE_ELECTRODE_1: NORMAL
MDC_IDC_SET_LEADCHNL_RV_PACING_CATHODE_LOCATION_1: NORMAL
MDC_IDC_SET_LEADCHNL_RV_PACING_POLARITY: NORMAL
MDC_IDC_SET_LEADCHNL_RV_PACING_PULSEWIDTH: 0.4 MS
MDC_IDC_SET_LEADCHNL_RV_SENSING_ANODE_ELECTRODE_1: NORMAL
MDC_IDC_SET_LEADCHNL_RV_SENSING_ANODE_LOCATION_1: NORMAL
MDC_IDC_SET_LEADCHNL_RV_SENSING_CATHODE_ELECTRODE_1: NORMAL
MDC_IDC_SET_LEADCHNL_RV_SENSING_CATHODE_LOCATION_1: NORMAL
MDC_IDC_SET_LEADCHNL_RV_SENSING_POLARITY: NORMAL
MDC_IDC_SET_LEADCHNL_RV_SENSING_SENSITIVITY: 0.9 MV
MDC_IDC_SET_ZONE_DETECTION_INTERVAL: 350 MS
MDC_IDC_SET_ZONE_DETECTION_INTERVAL: 400 MS
MDC_IDC_SET_ZONE_STATUS: NORMAL
MDC_IDC_SET_ZONE_STATUS: NORMAL
MDC_IDC_SET_ZONE_TYPE: NORMAL
MDC_IDC_SET_ZONE_VENDOR_TYPE: NORMAL
MDC_IDC_STAT_AT_BURDEN_PERCENT: 0 %
MDC_IDC_STAT_AT_DTM_END: NORMAL
MDC_IDC_STAT_AT_DTM_START: NORMAL
MDC_IDC_STAT_AT_MODE_SW_COUNT: 45
MDC_IDC_STAT_BRADY_AP_VP_PERCENT: 19.64 %
MDC_IDC_STAT_BRADY_AP_VS_PERCENT: 78.89 %
MDC_IDC_STAT_BRADY_AS_VP_PERCENT: 0.86 %
MDC_IDC_STAT_BRADY_AS_VS_PERCENT: 0.62 %
MDC_IDC_STAT_BRADY_DTM_END: NORMAL
MDC_IDC_STAT_BRADY_DTM_START: NORMAL
MDC_IDC_STAT_BRADY_RA_PERCENT_PACED: 97.89 %
MDC_IDC_STAT_BRADY_RV_PERCENT_PACED: 20.49 %
MDC_IDC_STAT_EPISODE_RECENT_COUNT: 0
MDC_IDC_STAT_EPISODE_RECENT_COUNT: 1
MDC_IDC_STAT_EPISODE_RECENT_COUNT: 45
MDC_IDC_STAT_EPISODE_RECENT_COUNT_DTM_END: NORMAL
MDC_IDC_STAT_EPISODE_RECENT_COUNT_DTM_START: NORMAL
MDC_IDC_STAT_EPISODE_TOTAL_COUNT: 0
MDC_IDC_STAT_EPISODE_TOTAL_COUNT: 17
MDC_IDC_STAT_EPISODE_TOTAL_COUNT: 51
MDC_IDC_STAT_EPISODE_TOTAL_COUNT_DTM_END: NORMAL
MDC_IDC_STAT_EPISODE_TOTAL_COUNT_DTM_START: NORMAL
MDC_IDC_STAT_EPISODE_TYPE: NORMAL
MDC_IDC_STAT_TACHYTHERAPY_RECENT_DTM_END: NORMAL
MDC_IDC_STAT_TACHYTHERAPY_RECENT_DTM_START: NORMAL
MDC_IDC_STAT_TACHYTHERAPY_TOTAL_DTM_END: NORMAL
MDC_IDC_STAT_TACHYTHERAPY_TOTAL_DTM_START: NORMAL

## 2024-02-02 PROCEDURE — 93280 PM DEVICE PROGR EVAL DUAL: CPT | Performed by: INTERNAL MEDICINE

## 2024-04-04 ENCOUNTER — ANCILLARY PROCEDURE (OUTPATIENT)
Dept: CARDIOLOGY | Facility: CLINIC | Age: 69
End: 2024-04-04
Attending: INTERNAL MEDICINE
Payer: COMMERCIAL

## 2024-04-04 DIAGNOSIS — I49.5 SICK SINUS SYNDROME (H): ICD-10-CM

## 2024-04-04 DIAGNOSIS — G40.009 PARTIAL IDIOPATHIC EPILEPSY WITH SEIZURES OF LOCALIZED ONSET, NOT INTRACTABLE, WITHOUT STATUS EPILEPTICUS (H): ICD-10-CM

## 2024-04-04 DIAGNOSIS — Z95.0 CARDIAC PACEMAKER IN SITU: ICD-10-CM

## 2024-04-04 LAB
MDC_IDC_EPISODE_DTM: NORMAL
MDC_IDC_EPISODE_DURATION: 1 S
MDC_IDC_EPISODE_ID: 69
MDC_IDC_EPISODE_TYPE: NORMAL
MDC_IDC_LEAD_CONNECTION_STATUS: NORMAL
MDC_IDC_LEAD_CONNECTION_STATUS: NORMAL
MDC_IDC_LEAD_IMPLANT_DT: NORMAL
MDC_IDC_LEAD_IMPLANT_DT: NORMAL
MDC_IDC_LEAD_LOCATION: NORMAL
MDC_IDC_LEAD_LOCATION: NORMAL
MDC_IDC_LEAD_MFG: NORMAL
MDC_IDC_LEAD_MFG: NORMAL
MDC_IDC_LEAD_MODEL: NORMAL
MDC_IDC_LEAD_MODEL: NORMAL
MDC_IDC_LEAD_POLARITY_TYPE: NORMAL
MDC_IDC_LEAD_POLARITY_TYPE: NORMAL
MDC_IDC_LEAD_SERIAL: NORMAL
MDC_IDC_LEAD_SERIAL: NORMAL
MDC_IDC_MSMT_BATTERY_DTM: NORMAL
MDC_IDC_MSMT_BATTERY_REMAINING_LONGEVITY: 91 MO
MDC_IDC_MSMT_BATTERY_RRT_TRIGGER: 2.62
MDC_IDC_MSMT_BATTERY_STATUS: NORMAL
MDC_IDC_MSMT_BATTERY_VOLTAGE: 2.98 V
MDC_IDC_MSMT_LEADCHNL_RA_IMPEDANCE_VALUE: 342 OHM
MDC_IDC_MSMT_LEADCHNL_RA_IMPEDANCE_VALUE: 380 OHM
MDC_IDC_MSMT_LEADCHNL_RA_PACING_THRESHOLD_AMPLITUDE: 0.75 V
MDC_IDC_MSMT_LEADCHNL_RA_PACING_THRESHOLD_PULSEWIDTH: 0.4 MS
MDC_IDC_MSMT_LEADCHNL_RA_SENSING_INTR_AMPL: 1.62 MV
MDC_IDC_MSMT_LEADCHNL_RA_SENSING_INTR_AMPL: 1.62 MV
MDC_IDC_MSMT_LEADCHNL_RV_IMPEDANCE_VALUE: 323 OHM
MDC_IDC_MSMT_LEADCHNL_RV_IMPEDANCE_VALUE: 380 OHM
MDC_IDC_MSMT_LEADCHNL_RV_PACING_THRESHOLD_AMPLITUDE: 1.62 V
MDC_IDC_MSMT_LEADCHNL_RV_PACING_THRESHOLD_PULSEWIDTH: 0.4 MS
MDC_IDC_MSMT_LEADCHNL_RV_SENSING_INTR_AMPL: 12.12 MV
MDC_IDC_MSMT_LEADCHNL_RV_SENSING_INTR_AMPL: 12.12 MV
MDC_IDC_PG_IMPLANT_DTM: NORMAL
MDC_IDC_PG_MFG: NORMAL
MDC_IDC_PG_MODEL: NORMAL
MDC_IDC_PG_SERIAL: NORMAL
MDC_IDC_PG_TYPE: NORMAL
MDC_IDC_SESS_CLINIC_NAME: NORMAL
MDC_IDC_SESS_DTM: NORMAL
MDC_IDC_SESS_TYPE: NORMAL
MDC_IDC_SET_BRADY_AT_MODE_SWITCH_RATE: 171 {BEATS}/MIN
MDC_IDC_SET_BRADY_HYSTRATE: NORMAL
MDC_IDC_SET_BRADY_LOWRATE: 70 {BEATS}/MIN
MDC_IDC_SET_BRADY_MAX_SENSOR_RATE: 145 {BEATS}/MIN
MDC_IDC_SET_BRADY_MAX_TRACKING_RATE: 145 {BEATS}/MIN
MDC_IDC_SET_BRADY_MODE: NORMAL
MDC_IDC_SET_BRADY_PAV_DELAY_LOW: 180 MS
MDC_IDC_SET_BRADY_SAV_DELAY_LOW: 150 MS
MDC_IDC_SET_LEADCHNL_RA_PACING_AMPLITUDE: 1.5 V
MDC_IDC_SET_LEADCHNL_RA_PACING_ANODE_ELECTRODE_1: NORMAL
MDC_IDC_SET_LEADCHNL_RA_PACING_ANODE_LOCATION_1: NORMAL
MDC_IDC_SET_LEADCHNL_RA_PACING_CAPTURE_MODE: NORMAL
MDC_IDC_SET_LEADCHNL_RA_PACING_CATHODE_ELECTRODE_1: NORMAL
MDC_IDC_SET_LEADCHNL_RA_PACING_CATHODE_LOCATION_1: NORMAL
MDC_IDC_SET_LEADCHNL_RA_PACING_POLARITY: NORMAL
MDC_IDC_SET_LEADCHNL_RA_PACING_PULSEWIDTH: 0.4 MS
MDC_IDC_SET_LEADCHNL_RA_SENSING_ANODE_ELECTRODE_1: NORMAL
MDC_IDC_SET_LEADCHNL_RA_SENSING_ANODE_LOCATION_1: NORMAL
MDC_IDC_SET_LEADCHNL_RA_SENSING_CATHODE_ELECTRODE_1: NORMAL
MDC_IDC_SET_LEADCHNL_RA_SENSING_CATHODE_LOCATION_1: NORMAL
MDC_IDC_SET_LEADCHNL_RA_SENSING_POLARITY: NORMAL
MDC_IDC_SET_LEADCHNL_RA_SENSING_SENSITIVITY: 0.6 MV
MDC_IDC_SET_LEADCHNL_RV_PACING_AMPLITUDE: 2.5 V
MDC_IDC_SET_LEADCHNL_RV_PACING_ANODE_ELECTRODE_1: NORMAL
MDC_IDC_SET_LEADCHNL_RV_PACING_ANODE_LOCATION_1: NORMAL
MDC_IDC_SET_LEADCHNL_RV_PACING_CAPTURE_MODE: NORMAL
MDC_IDC_SET_LEADCHNL_RV_PACING_CATHODE_ELECTRODE_1: NORMAL
MDC_IDC_SET_LEADCHNL_RV_PACING_CATHODE_LOCATION_1: NORMAL
MDC_IDC_SET_LEADCHNL_RV_PACING_POLARITY: NORMAL
MDC_IDC_SET_LEADCHNL_RV_PACING_PULSEWIDTH: 0.4 MS
MDC_IDC_SET_LEADCHNL_RV_SENSING_ANODE_ELECTRODE_1: NORMAL
MDC_IDC_SET_LEADCHNL_RV_SENSING_ANODE_LOCATION_1: NORMAL
MDC_IDC_SET_LEADCHNL_RV_SENSING_CATHODE_ELECTRODE_1: NORMAL
MDC_IDC_SET_LEADCHNL_RV_SENSING_CATHODE_LOCATION_1: NORMAL
MDC_IDC_SET_LEADCHNL_RV_SENSING_POLARITY: NORMAL
MDC_IDC_SET_LEADCHNL_RV_SENSING_SENSITIVITY: 0.9 MV
MDC_IDC_SET_ZONE_DETECTION_INTERVAL: 350 MS
MDC_IDC_SET_ZONE_DETECTION_INTERVAL: 400 MS
MDC_IDC_SET_ZONE_STATUS: NORMAL
MDC_IDC_SET_ZONE_STATUS: NORMAL
MDC_IDC_SET_ZONE_TYPE: NORMAL
MDC_IDC_SET_ZONE_VENDOR_TYPE: NORMAL
MDC_IDC_STAT_AT_BURDEN_PERCENT: 0 %
MDC_IDC_STAT_AT_DTM_END: NORMAL
MDC_IDC_STAT_AT_DTM_START: NORMAL
MDC_IDC_STAT_BRADY_AP_VP_PERCENT: 7.09 %
MDC_IDC_STAT_BRADY_AP_VS_PERCENT: 92 %
MDC_IDC_STAT_BRADY_AS_VP_PERCENT: 0.32 %
MDC_IDC_STAT_BRADY_AS_VS_PERCENT: 0.59 %
MDC_IDC_STAT_BRADY_DTM_END: NORMAL
MDC_IDC_STAT_BRADY_DTM_START: NORMAL
MDC_IDC_STAT_BRADY_RA_PERCENT_PACED: 98.67 %
MDC_IDC_STAT_BRADY_RV_PERCENT_PACED: 7.41 %
MDC_IDC_STAT_EPISODE_RECENT_COUNT: 0
MDC_IDC_STAT_EPISODE_RECENT_COUNT: 1
MDC_IDC_STAT_EPISODE_RECENT_COUNT_DTM_END: NORMAL
MDC_IDC_STAT_EPISODE_RECENT_COUNT_DTM_START: NORMAL
MDC_IDC_STAT_EPISODE_TOTAL_COUNT: 0
MDC_IDC_STAT_EPISODE_TOTAL_COUNT: 17
MDC_IDC_STAT_EPISODE_TOTAL_COUNT: 52
MDC_IDC_STAT_EPISODE_TOTAL_COUNT_DTM_END: NORMAL
MDC_IDC_STAT_EPISODE_TOTAL_COUNT_DTM_START: NORMAL
MDC_IDC_STAT_EPISODE_TYPE: NORMAL
MDC_IDC_STAT_TACHYTHERAPY_RECENT_DTM_END: NORMAL
MDC_IDC_STAT_TACHYTHERAPY_RECENT_DTM_START: NORMAL
MDC_IDC_STAT_TACHYTHERAPY_TOTAL_DTM_END: NORMAL
MDC_IDC_STAT_TACHYTHERAPY_TOTAL_DTM_START: NORMAL

## 2024-04-04 PROCEDURE — 93296 REM INTERROG EVL PM/IDS: CPT | Performed by: INTERNAL MEDICINE

## 2024-04-04 PROCEDURE — 93294 REM INTERROG EVL PM/LDLS PM: CPT | Performed by: INTERNAL MEDICINE

## 2024-04-04 NOTE — TELEPHONE ENCOUNTER
Refill request for: Phenytoin    Directions: Take 1 capsule in the AM and 2 Capsules at bedtime     LOV: 4/11/23  NOV: 4/16/24    90 day supply with 3 refills Medication T'd for review and signature

## 2024-04-05 RX ORDER — PHENYTOIN SODIUM 100 MG/1
CAPSULE, EXTENDED RELEASE ORAL
Qty: 270 CAPSULE | Refills: 3 | Status: SHIPPED | OUTPATIENT
Start: 2024-04-05 | End: 2024-04-16
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
## 2024-04-16 ENCOUNTER — LAB (OUTPATIENT)
Dept: LAB | Facility: HOSPITAL | Age: 69
End: 2024-04-16
Payer: COMMERCIAL

## 2024-04-16 ENCOUNTER — OFFICE VISIT (OUTPATIENT)
Dept: NEUROLOGY | Facility: CLINIC | Age: 69
End: 2024-04-16
Payer: COMMERCIAL

## 2024-04-16 VITALS
BODY MASS INDEX: 24.01 KG/M2 | HEIGHT: 67 IN | DIASTOLIC BLOOD PRESSURE: 71 MMHG | SYSTOLIC BLOOD PRESSURE: 141 MMHG | HEART RATE: 75 BPM | WEIGHT: 153 LBS

## 2024-04-16 DIAGNOSIS — G40.009 PARTIAL IDIOPATHIC EPILEPSY WITH SEIZURES OF LOCALIZED ONSET, NOT INTRACTABLE, WITHOUT STATUS EPILEPTICUS (H): Primary | ICD-10-CM

## 2024-04-16 DIAGNOSIS — G40.009 PARTIAL IDIOPATHIC EPILEPSY WITH SEIZURES OF LOCALIZED ONSET, NOT INTRACTABLE, WITHOUT STATUS EPILEPTICUS (H): ICD-10-CM

## 2024-04-16 LAB — PHENYTOIN SERPL-MCNC: 7.5 UG/ML

## 2024-04-16 PROCEDURE — 80185 ASSAY OF PHENYTOIN TOTAL: CPT

## 2024-04-16 PROCEDURE — 36415 COLL VENOUS BLD VENIPUNCTURE: CPT

## 2024-04-16 PROCEDURE — 99214 OFFICE O/P EST MOD 30 MIN: CPT | Performed by: PSYCHIATRY & NEUROLOGY

## 2024-04-16 PROCEDURE — 80177 DRUG SCRN QUAN LEVETIRACETAM: CPT

## 2024-04-16 RX ORDER — PHENYTOIN SODIUM 100 MG/1
CAPSULE, EXTENDED RELEASE ORAL
Qty: 270 CAPSULE | Refills: 3 | Status: SHIPPED | OUTPATIENT
Start: 2024-04-16

## 2024-04-16 RX ORDER — LEVETIRACETAM 250 MG/1
250 TABLET ORAL 2 TIMES DAILY
Qty: 180 TABLET | Refills: 3 | Status: SHIPPED | OUTPATIENT
Start: 2024-04-16 | End: 2024-06-07

## 2024-04-16 NOTE — LETTER
April 17, 2024      Chaya Bean  [address]   [facility] [address]        Dear ,    We are writing to inform you of your test results.    Laboratory data 4/16/2024  Keppra 5.2  Dilantin 7.5  Levels above are okay a bit on the low side.  Continue medications the same, make sure you are not missing any doses thank you.    If you have any questions or concerns, please call the clinic at the number listed above.       Sincerely,    Dr. Michael Chilel

## 2024-04-16 NOTE — NURSING NOTE
Chief Complaint   Patient presents with    Seizures     Annual follow up. Pt denies any seizures.     Carolyn Montoya LPN on 4/16/2024 at 11:11 AM

## 2024-04-17 LAB — LEVETIRACETAM SERPL-MCNC: 5.2 ΜG/ML (ref 10–40)

## 2024-05-02 DIAGNOSIS — E87.6 HYPOKALEMIA: ICD-10-CM

## 2024-05-02 RX ORDER — POTASSIUM CHLORIDE 750 MG/1
CAPSULE, EXTENDED RELEASE ORAL
Qty: 360 CAPSULE | Refills: 1 | Status: SHIPPED | OUTPATIENT
Start: 2024-05-02

## 2024-06-07 DIAGNOSIS — G40.009 PARTIAL IDIOPATHIC EPILEPSY WITH SEIZURES OF LOCALIZED ONSET, NOT INTRACTABLE, WITHOUT STATUS EPILEPTICUS (H): ICD-10-CM

## 2024-06-07 RX ORDER — LEVETIRACETAM 250 MG/1
TABLET ORAL
Qty: 180 TABLET | Refills: 3 | Status: SHIPPED | OUTPATIENT
Start: 2024-06-07

## 2024-06-07 NOTE — TELEPHONE ENCOUNTER
Refill request for: levetiracetam 250mg    Directions: Take 1 tablet (250 mg) by mouth 2 times daily     LOV: 04/16/24  NOV: 04/16/25    90 day supply with 3 refills Medication T'd for review and signature    Carolyn Montoya LPN on 6/7/2024 at 1:35 PM

## 2024-07-10 ENCOUNTER — TRANSFERRED RECORDS (OUTPATIENT)
Dept: HEALTH INFORMATION MANAGEMENT | Facility: CLINIC | Age: 69
End: 2024-07-10

## 2024-07-10 ENCOUNTER — LAB REQUISITION (OUTPATIENT)
Dept: LAB | Facility: CLINIC | Age: 69
End: 2024-07-10
Payer: COMMERCIAL

## 2024-07-10 DIAGNOSIS — G40.909 EPILEPSY, UNSPECIFIED, NOT INTRACTABLE, WITHOUT STATUS EPILEPTICUS (H): ICD-10-CM

## 2024-07-10 DIAGNOSIS — E78.2 MIXED HYPERLIPIDEMIA: ICD-10-CM

## 2024-07-10 DIAGNOSIS — E03.9 HYPOTHYROIDISM, UNSPECIFIED: ICD-10-CM

## 2024-07-10 DIAGNOSIS — I10 ESSENTIAL (PRIMARY) HYPERTENSION: ICD-10-CM

## 2024-07-10 LAB
BASOPHILS # BLD AUTO: 0 10E3/UL (ref 0–0.2)
BASOPHILS NFR BLD AUTO: 0 %
EOSINOPHIL # BLD AUTO: 0.3 10E3/UL (ref 0–0.7)
EOSINOPHIL NFR BLD AUTO: 6 %
ERYTHROCYTE [DISTWIDTH] IN BLOOD BY AUTOMATED COUNT: 12.9 % (ref 10–15)
HCT VFR BLD AUTO: 37 % (ref 35–47)
HGB BLD-MCNC: 13 G/DL (ref 11.7–15.7)
IMM GRANULOCYTES # BLD: 0 10E3/UL
IMM GRANULOCYTES NFR BLD: 0 %
LYMPHOCYTES # BLD AUTO: 2 10E3/UL (ref 0.8–5.3)
LYMPHOCYTES NFR BLD AUTO: 40 %
MCH RBC QN AUTO: 32.2 PG (ref 26.5–33)
MCHC RBC AUTO-ENTMCNC: 35.1 G/DL (ref 31.5–36.5)
MCV RBC AUTO: 92 FL (ref 78–100)
MONOCYTES # BLD AUTO: 0.4 10E3/UL (ref 0–1.3)
MONOCYTES NFR BLD AUTO: 8 %
NEUTROPHILS # BLD AUTO: 2.3 10E3/UL (ref 1.6–8.3)
NEUTROPHILS NFR BLD AUTO: 46 %
NRBC # BLD AUTO: 0 10E3/UL
NRBC BLD AUTO-RTO: 0 /100
PLATELET # BLD AUTO: 166 10E3/UL (ref 150–450)
RBC # BLD AUTO: 4.04 10E6/UL (ref 3.8–5.2)
WBC # BLD AUTO: 4.9 10E3/UL (ref 4–11)

## 2024-07-10 PROCEDURE — 85025 COMPLETE CBC W/AUTO DIFF WBC: CPT | Mod: ORL | Performed by: FAMILY MEDICINE

## 2024-07-10 PROCEDURE — 84443 ASSAY THYROID STIM HORMONE: CPT | Mod: ORL | Performed by: FAMILY MEDICINE

## 2024-07-10 PROCEDURE — 80053 COMPREHEN METABOLIC PANEL: CPT | Mod: ORL | Performed by: FAMILY MEDICINE

## 2024-07-10 PROCEDURE — 80061 LIPID PANEL: CPT | Mod: ORL | Performed by: FAMILY MEDICINE

## 2024-07-11 LAB
ALBUMIN SERPL BCG-MCNC: 4.1 G/DL (ref 3.5–5.2)
ALP SERPL-CCNC: 100 U/L (ref 40–150)
ALT SERPL W P-5'-P-CCNC: 20 U/L (ref 0–50)
ANION GAP SERPL CALCULATED.3IONS-SCNC: 11 MMOL/L (ref 7–15)
AST SERPL W P-5'-P-CCNC: 29 U/L (ref 0–45)
BILIRUB SERPL-MCNC: 0.3 MG/DL
BUN SERPL-MCNC: 15.7 MG/DL (ref 8–23)
CALCIUM SERPL-MCNC: 9.6 MG/DL (ref 8.8–10.2)
CHLORIDE SERPL-SCNC: 104 MMOL/L (ref 98–107)
CHOLEST SERPL-MCNC: 152 MG/DL
CREAT SERPL-MCNC: 0.72 MG/DL (ref 0.51–0.95)
DEPRECATED HCO3 PLAS-SCNC: 25 MMOL/L (ref 22–29)
EGFRCR SERPLBLD CKD-EPI 2021: 90 ML/MIN/1.73M2
FASTING STATUS PATIENT QL REPORTED: ABNORMAL
FASTING STATUS PATIENT QL REPORTED: NORMAL
GLUCOSE SERPL-MCNC: 86 MG/DL (ref 70–99)
HDLC SERPL-MCNC: 60 MG/DL
LDLC SERPL CALC-MCNC: 72 MG/DL
NONHDLC SERPL-MCNC: 92 MG/DL
POTASSIUM SERPL-SCNC: 3.2 MMOL/L (ref 3.4–5.3)
PROT SERPL-MCNC: 6.5 G/DL (ref 6.4–8.3)
SODIUM SERPL-SCNC: 140 MMOL/L (ref 135–145)
TRIGL SERPL-MCNC: 101 MG/DL
TSH SERPL DL<=0.005 MIU/L-ACNC: 0.03 UIU/ML (ref 0.3–4.2)

## 2024-07-12 DIAGNOSIS — E87.6 HYPOKALEMIA: ICD-10-CM

## 2024-07-12 RX ORDER — POTASSIUM CHLORIDE 1500 MG/1
20 TABLET, EXTENDED RELEASE ORAL 2 TIMES DAILY
Qty: 180 TABLET | Refills: 0 | Status: SHIPPED | OUTPATIENT
Start: 2024-07-12

## 2024-07-16 ENCOUNTER — ANCILLARY PROCEDURE (OUTPATIENT)
Dept: CARDIOLOGY | Facility: CLINIC | Age: 69
End: 2024-07-16
Attending: INTERNAL MEDICINE
Payer: COMMERCIAL

## 2024-07-16 DIAGNOSIS — Z95.0 CARDIAC PACEMAKER IN SITU: ICD-10-CM

## 2024-07-16 DIAGNOSIS — I49.5 SICK SINUS SYNDROME (H): ICD-10-CM

## 2024-07-16 LAB
MDC_IDC_EPISODE_DTM: NORMAL
MDC_IDC_EPISODE_DTM: NORMAL
MDC_IDC_EPISODE_DURATION: 1 S
MDC_IDC_EPISODE_DURATION: 1 S
MDC_IDC_EPISODE_ID: 70
MDC_IDC_EPISODE_ID: 71
MDC_IDC_EPISODE_TYPE: NORMAL
MDC_IDC_EPISODE_TYPE: NORMAL
MDC_IDC_LEAD_CONNECTION_STATUS: NORMAL
MDC_IDC_LEAD_CONNECTION_STATUS: NORMAL
MDC_IDC_LEAD_IMPLANT_DT: NORMAL
MDC_IDC_LEAD_IMPLANT_DT: NORMAL
MDC_IDC_LEAD_LOCATION: NORMAL
MDC_IDC_LEAD_LOCATION: NORMAL
MDC_IDC_LEAD_MFG: NORMAL
MDC_IDC_LEAD_MFG: NORMAL
MDC_IDC_LEAD_MODEL: NORMAL
MDC_IDC_LEAD_MODEL: NORMAL
MDC_IDC_LEAD_POLARITY_TYPE: NORMAL
MDC_IDC_LEAD_POLARITY_TYPE: NORMAL
MDC_IDC_LEAD_SERIAL: NORMAL
MDC_IDC_LEAD_SERIAL: NORMAL
MDC_IDC_MSMT_BATTERY_DTM: NORMAL
MDC_IDC_MSMT_BATTERY_REMAINING_LONGEVITY: 78 MO
MDC_IDC_MSMT_BATTERY_RRT_TRIGGER: 2.62
MDC_IDC_MSMT_BATTERY_STATUS: NORMAL
MDC_IDC_MSMT_BATTERY_VOLTAGE: 2.97 V
MDC_IDC_MSMT_LEADCHNL_RA_IMPEDANCE_VALUE: 323 OHM
MDC_IDC_MSMT_LEADCHNL_RA_IMPEDANCE_VALUE: 361 OHM
MDC_IDC_MSMT_LEADCHNL_RA_PACING_THRESHOLD_AMPLITUDE: 0.5 V
MDC_IDC_MSMT_LEADCHNL_RA_PACING_THRESHOLD_PULSEWIDTH: 0.4 MS
MDC_IDC_MSMT_LEADCHNL_RA_SENSING_INTR_AMPL: 1.38 MV
MDC_IDC_MSMT_LEADCHNL_RA_SENSING_INTR_AMPL: 1.38 MV
MDC_IDC_MSMT_LEADCHNL_RV_IMPEDANCE_VALUE: 323 OHM
MDC_IDC_MSMT_LEADCHNL_RV_IMPEDANCE_VALUE: 399 OHM
MDC_IDC_MSMT_LEADCHNL_RV_PACING_THRESHOLD_AMPLITUDE: 1.5 V
MDC_IDC_MSMT_LEADCHNL_RV_PACING_THRESHOLD_PULSEWIDTH: 0.4 MS
MDC_IDC_MSMT_LEADCHNL_RV_SENSING_INTR_AMPL: 12.12 MV
MDC_IDC_MSMT_LEADCHNL_RV_SENSING_INTR_AMPL: 12.12 MV
MDC_IDC_PG_IMPLANT_DTM: NORMAL
MDC_IDC_PG_MFG: NORMAL
MDC_IDC_PG_MODEL: NORMAL
MDC_IDC_PG_SERIAL: NORMAL
MDC_IDC_PG_TYPE: NORMAL
MDC_IDC_SESS_CLINIC_NAME: NORMAL
MDC_IDC_SESS_DTM: NORMAL
MDC_IDC_SESS_TYPE: NORMAL
MDC_IDC_SET_BRADY_AT_MODE_SWITCH_RATE: 171 {BEATS}/MIN
MDC_IDC_SET_BRADY_HYSTRATE: NORMAL
MDC_IDC_SET_BRADY_LOWRATE: 70 {BEATS}/MIN
MDC_IDC_SET_BRADY_MAX_SENSOR_RATE: 145 {BEATS}/MIN
MDC_IDC_SET_BRADY_MAX_TRACKING_RATE: 145 {BEATS}/MIN
MDC_IDC_SET_BRADY_MODE: NORMAL
MDC_IDC_SET_BRADY_PAV_DELAY_LOW: 180 MS
MDC_IDC_SET_BRADY_SAV_DELAY_LOW: 150 MS
MDC_IDC_SET_LEADCHNL_RA_PACING_AMPLITUDE: 1.5 V
MDC_IDC_SET_LEADCHNL_RA_PACING_ANODE_ELECTRODE_1: NORMAL
MDC_IDC_SET_LEADCHNL_RA_PACING_ANODE_LOCATION_1: NORMAL
MDC_IDC_SET_LEADCHNL_RA_PACING_CAPTURE_MODE: NORMAL
MDC_IDC_SET_LEADCHNL_RA_PACING_CATHODE_ELECTRODE_1: NORMAL
MDC_IDC_SET_LEADCHNL_RA_PACING_CATHODE_LOCATION_1: NORMAL
MDC_IDC_SET_LEADCHNL_RA_PACING_POLARITY: NORMAL
MDC_IDC_SET_LEADCHNL_RA_PACING_PULSEWIDTH: 0.4 MS
MDC_IDC_SET_LEADCHNL_RA_SENSING_ANODE_ELECTRODE_1: NORMAL
MDC_IDC_SET_LEADCHNL_RA_SENSING_ANODE_LOCATION_1: NORMAL
MDC_IDC_SET_LEADCHNL_RA_SENSING_CATHODE_ELECTRODE_1: NORMAL
MDC_IDC_SET_LEADCHNL_RA_SENSING_CATHODE_LOCATION_1: NORMAL
MDC_IDC_SET_LEADCHNL_RA_SENSING_POLARITY: NORMAL
MDC_IDC_SET_LEADCHNL_RA_SENSING_SENSITIVITY: 0.6 MV
MDC_IDC_SET_LEADCHNL_RV_PACING_AMPLITUDE: 2.25 V
MDC_IDC_SET_LEADCHNL_RV_PACING_ANODE_ELECTRODE_1: NORMAL
MDC_IDC_SET_LEADCHNL_RV_PACING_ANODE_LOCATION_1: NORMAL
MDC_IDC_SET_LEADCHNL_RV_PACING_CAPTURE_MODE: NORMAL
MDC_IDC_SET_LEADCHNL_RV_PACING_CATHODE_ELECTRODE_1: NORMAL
MDC_IDC_SET_LEADCHNL_RV_PACING_CATHODE_LOCATION_1: NORMAL
MDC_IDC_SET_LEADCHNL_RV_PACING_POLARITY: NORMAL
MDC_IDC_SET_LEADCHNL_RV_PACING_PULSEWIDTH: 0.4 MS
MDC_IDC_SET_LEADCHNL_RV_SENSING_ANODE_ELECTRODE_1: NORMAL
MDC_IDC_SET_LEADCHNL_RV_SENSING_ANODE_LOCATION_1: NORMAL
MDC_IDC_SET_LEADCHNL_RV_SENSING_CATHODE_ELECTRODE_1: NORMAL
MDC_IDC_SET_LEADCHNL_RV_SENSING_CATHODE_LOCATION_1: NORMAL
MDC_IDC_SET_LEADCHNL_RV_SENSING_POLARITY: NORMAL
MDC_IDC_SET_LEADCHNL_RV_SENSING_SENSITIVITY: 0.9 MV
MDC_IDC_SET_ZONE_DETECTION_INTERVAL: 350 MS
MDC_IDC_SET_ZONE_DETECTION_INTERVAL: 400 MS
MDC_IDC_SET_ZONE_STATUS: NORMAL
MDC_IDC_SET_ZONE_STATUS: NORMAL
MDC_IDC_SET_ZONE_TYPE: NORMAL
MDC_IDC_SET_ZONE_VENDOR_TYPE: NORMAL
MDC_IDC_STAT_AT_BURDEN_PERCENT: 0 %
MDC_IDC_STAT_AT_DTM_END: NORMAL
MDC_IDC_STAT_AT_DTM_START: NORMAL
MDC_IDC_STAT_BRADY_AP_VP_PERCENT: 28.1 %
MDC_IDC_STAT_BRADY_AP_VS_PERCENT: 68.12 %
MDC_IDC_STAT_BRADY_AS_VP_PERCENT: 2.82 %
MDC_IDC_STAT_BRADY_AS_VS_PERCENT: 0.95 %
MDC_IDC_STAT_BRADY_DTM_END: NORMAL
MDC_IDC_STAT_BRADY_DTM_START: NORMAL
MDC_IDC_STAT_BRADY_RA_PERCENT_PACED: 95.05 %
MDC_IDC_STAT_BRADY_RV_PERCENT_PACED: 30.92 %
MDC_IDC_STAT_EPISODE_RECENT_COUNT: 0
MDC_IDC_STAT_EPISODE_RECENT_COUNT: 2
MDC_IDC_STAT_EPISODE_RECENT_COUNT_DTM_END: NORMAL
MDC_IDC_STAT_EPISODE_RECENT_COUNT_DTM_START: NORMAL
MDC_IDC_STAT_EPISODE_TOTAL_COUNT: 0
MDC_IDC_STAT_EPISODE_TOTAL_COUNT: 17
MDC_IDC_STAT_EPISODE_TOTAL_COUNT: 54
MDC_IDC_STAT_EPISODE_TOTAL_COUNT_DTM_END: NORMAL
MDC_IDC_STAT_EPISODE_TOTAL_COUNT_DTM_START: NORMAL
MDC_IDC_STAT_EPISODE_TYPE: NORMAL
MDC_IDC_STAT_TACHYTHERAPY_RECENT_DTM_END: NORMAL
MDC_IDC_STAT_TACHYTHERAPY_RECENT_DTM_START: NORMAL
MDC_IDC_STAT_TACHYTHERAPY_TOTAL_DTM_END: NORMAL
MDC_IDC_STAT_TACHYTHERAPY_TOTAL_DTM_START: NORMAL

## 2024-07-16 PROCEDURE — 93294 REM INTERROG EVL PM/LDLS PM: CPT | Performed by: INTERNAL MEDICINE

## 2024-07-16 PROCEDURE — 93296 REM INTERROG EVL PM/IDS: CPT | Performed by: INTERNAL MEDICINE

## 2024-10-08 ENCOUNTER — LAB REQUISITION (OUTPATIENT)
Dept: LAB | Facility: CLINIC | Age: 69
End: 2024-10-08
Payer: COMMERCIAL

## 2024-10-08 ENCOUNTER — TRANSFERRED RECORDS (OUTPATIENT)
Dept: HEALTH INFORMATION MANAGEMENT | Facility: CLINIC | Age: 69
End: 2024-10-08

## 2024-10-08 DIAGNOSIS — E03.9 HYPOTHYROIDISM, UNSPECIFIED: ICD-10-CM

## 2024-10-08 LAB — TSH SERPL DL<=0.005 MIU/L-ACNC: 0.02 UIU/ML (ref 0.3–4.2)

## 2024-10-08 PROCEDURE — 84443 ASSAY THYROID STIM HORMONE: CPT | Mod: ORL | Performed by: FAMILY MEDICINE

## 2024-10-28 ENCOUNTER — ANCILLARY PROCEDURE (OUTPATIENT)
Dept: CARDIOLOGY | Facility: CLINIC | Age: 69
End: 2024-10-28
Attending: INTERNAL MEDICINE
Payer: COMMERCIAL

## 2024-10-28 DIAGNOSIS — Z95.0 CARDIAC PACEMAKER IN SITU: ICD-10-CM

## 2024-10-28 DIAGNOSIS — I44.2 COMPLETE ATRIOVENTRICULAR BLOCK (H): ICD-10-CM

## 2024-10-28 DIAGNOSIS — I49.5 SICK SINUS SYNDROME (H): ICD-10-CM

## 2024-11-04 LAB
MDC_IDC_LEAD_CONNECTION_STATUS: NORMAL
MDC_IDC_LEAD_CONNECTION_STATUS: NORMAL
MDC_IDC_LEAD_IMPLANT_DT: NORMAL
MDC_IDC_LEAD_IMPLANT_DT: NORMAL
MDC_IDC_LEAD_LOCATION: NORMAL
MDC_IDC_LEAD_LOCATION: NORMAL
MDC_IDC_LEAD_MFG: NORMAL
MDC_IDC_LEAD_MFG: NORMAL
MDC_IDC_LEAD_MODEL: NORMAL
MDC_IDC_LEAD_MODEL: NORMAL
MDC_IDC_LEAD_POLARITY_TYPE: NORMAL
MDC_IDC_LEAD_POLARITY_TYPE: NORMAL
MDC_IDC_LEAD_SERIAL: NORMAL
MDC_IDC_LEAD_SERIAL: NORMAL
MDC_IDC_MSMT_BATTERY_DTM: NORMAL
MDC_IDC_MSMT_BATTERY_REMAINING_LONGEVITY: 76 MO
MDC_IDC_MSMT_BATTERY_RRT_TRIGGER: 2.62
MDC_IDC_MSMT_BATTERY_STATUS: NORMAL
MDC_IDC_MSMT_BATTERY_VOLTAGE: 2.97 V
MDC_IDC_MSMT_LEADCHNL_RA_IMPEDANCE_VALUE: 342 OHM
MDC_IDC_MSMT_LEADCHNL_RA_IMPEDANCE_VALUE: 380 OHM
MDC_IDC_MSMT_LEADCHNL_RA_PACING_THRESHOLD_AMPLITUDE: 0.62 V
MDC_IDC_MSMT_LEADCHNL_RA_PACING_THRESHOLD_PULSEWIDTH: 0.4 MS
MDC_IDC_MSMT_LEADCHNL_RA_SENSING_INTR_AMPL: 2.12 MV
MDC_IDC_MSMT_LEADCHNL_RA_SENSING_INTR_AMPL: 2.12 MV
MDC_IDC_MSMT_LEADCHNL_RV_IMPEDANCE_VALUE: 323 OHM
MDC_IDC_MSMT_LEADCHNL_RV_IMPEDANCE_VALUE: 418 OHM
MDC_IDC_MSMT_LEADCHNL_RV_PACING_THRESHOLD_AMPLITUDE: 1.75 V
MDC_IDC_MSMT_LEADCHNL_RV_PACING_THRESHOLD_PULSEWIDTH: 0.4 MS
MDC_IDC_MSMT_LEADCHNL_RV_SENSING_INTR_AMPL: 12 MV
MDC_IDC_MSMT_LEADCHNL_RV_SENSING_INTR_AMPL: 12 MV
MDC_IDC_PG_IMPLANT_DTM: NORMAL
MDC_IDC_PG_MFG: NORMAL
MDC_IDC_PG_MODEL: NORMAL
MDC_IDC_PG_SERIAL: NORMAL
MDC_IDC_PG_TYPE: NORMAL
MDC_IDC_SESS_CLINIC_NAME: NORMAL
MDC_IDC_SESS_DTM: NORMAL
MDC_IDC_SESS_TYPE: NORMAL
MDC_IDC_SET_BRADY_AT_MODE_SWITCH_RATE: 171 {BEATS}/MIN
MDC_IDC_SET_BRADY_HYSTRATE: NORMAL
MDC_IDC_SET_BRADY_LOWRATE: 70 {BEATS}/MIN
MDC_IDC_SET_BRADY_MAX_SENSOR_RATE: 145 {BEATS}/MIN
MDC_IDC_SET_BRADY_MAX_TRACKING_RATE: 145 {BEATS}/MIN
MDC_IDC_SET_BRADY_MODE: NORMAL
MDC_IDC_SET_BRADY_PAV_DELAY_LOW: 180 MS
MDC_IDC_SET_BRADY_SAV_DELAY_LOW: 150 MS
MDC_IDC_SET_LEADCHNL_RA_PACING_AMPLITUDE: 1.5 V
MDC_IDC_SET_LEADCHNL_RA_PACING_ANODE_ELECTRODE_1: NORMAL
MDC_IDC_SET_LEADCHNL_RA_PACING_ANODE_LOCATION_1: NORMAL
MDC_IDC_SET_LEADCHNL_RA_PACING_CAPTURE_MODE: NORMAL
MDC_IDC_SET_LEADCHNL_RA_PACING_CATHODE_ELECTRODE_1: NORMAL
MDC_IDC_SET_LEADCHNL_RA_PACING_CATHODE_LOCATION_1: NORMAL
MDC_IDC_SET_LEADCHNL_RA_PACING_POLARITY: NORMAL
MDC_IDC_SET_LEADCHNL_RA_PACING_PULSEWIDTH: 0.4 MS
MDC_IDC_SET_LEADCHNL_RA_SENSING_ANODE_ELECTRODE_1: NORMAL
MDC_IDC_SET_LEADCHNL_RA_SENSING_ANODE_LOCATION_1: NORMAL
MDC_IDC_SET_LEADCHNL_RA_SENSING_CATHODE_ELECTRODE_1: NORMAL
MDC_IDC_SET_LEADCHNL_RA_SENSING_CATHODE_LOCATION_1: NORMAL
MDC_IDC_SET_LEADCHNL_RA_SENSING_POLARITY: NORMAL
MDC_IDC_SET_LEADCHNL_RA_SENSING_SENSITIVITY: 0.6 MV
MDC_IDC_SET_LEADCHNL_RV_PACING_AMPLITUDE: 2.75 V
MDC_IDC_SET_LEADCHNL_RV_PACING_ANODE_ELECTRODE_1: NORMAL
MDC_IDC_SET_LEADCHNL_RV_PACING_ANODE_LOCATION_1: NORMAL
MDC_IDC_SET_LEADCHNL_RV_PACING_CAPTURE_MODE: NORMAL
MDC_IDC_SET_LEADCHNL_RV_PACING_CATHODE_ELECTRODE_1: NORMAL
MDC_IDC_SET_LEADCHNL_RV_PACING_CATHODE_LOCATION_1: NORMAL
MDC_IDC_SET_LEADCHNL_RV_PACING_POLARITY: NORMAL
MDC_IDC_SET_LEADCHNL_RV_PACING_PULSEWIDTH: 0.4 MS
MDC_IDC_SET_LEADCHNL_RV_SENSING_ANODE_ELECTRODE_1: NORMAL
MDC_IDC_SET_LEADCHNL_RV_SENSING_ANODE_LOCATION_1: NORMAL
MDC_IDC_SET_LEADCHNL_RV_SENSING_CATHODE_ELECTRODE_1: NORMAL
MDC_IDC_SET_LEADCHNL_RV_SENSING_CATHODE_LOCATION_1: NORMAL
MDC_IDC_SET_LEADCHNL_RV_SENSING_POLARITY: NORMAL
MDC_IDC_SET_LEADCHNL_RV_SENSING_SENSITIVITY: 0.9 MV
MDC_IDC_SET_ZONE_DETECTION_INTERVAL: 350 MS
MDC_IDC_SET_ZONE_DETECTION_INTERVAL: 400 MS
MDC_IDC_SET_ZONE_STATUS: NORMAL
MDC_IDC_SET_ZONE_STATUS: NORMAL
MDC_IDC_SET_ZONE_TYPE: NORMAL
MDC_IDC_SET_ZONE_VENDOR_TYPE: NORMAL
MDC_IDC_STAT_AT_BURDEN_PERCENT: 0 %
MDC_IDC_STAT_AT_DTM_END: NORMAL
MDC_IDC_STAT_AT_DTM_START: NORMAL
MDC_IDC_STAT_BRADY_AP_VP_PERCENT: 40.82 %
MDC_IDC_STAT_BRADY_AP_VS_PERCENT: 56.56 %
MDC_IDC_STAT_BRADY_AS_VP_PERCENT: 1.82 %
MDC_IDC_STAT_BRADY_AS_VS_PERCENT: 0.8 %
MDC_IDC_STAT_BRADY_DTM_END: NORMAL
MDC_IDC_STAT_BRADY_DTM_START: NORMAL
MDC_IDC_STAT_BRADY_RA_PERCENT_PACED: 96.36 %
MDC_IDC_STAT_BRADY_RV_PERCENT_PACED: 42.64 %
MDC_IDC_STAT_EPISODE_RECENT_COUNT: 0
MDC_IDC_STAT_EPISODE_RECENT_COUNT_DTM_END: NORMAL
MDC_IDC_STAT_EPISODE_RECENT_COUNT_DTM_START: NORMAL
MDC_IDC_STAT_EPISODE_TOTAL_COUNT: 0
MDC_IDC_STAT_EPISODE_TOTAL_COUNT: 17
MDC_IDC_STAT_EPISODE_TOTAL_COUNT: 54
MDC_IDC_STAT_EPISODE_TOTAL_COUNT_DTM_END: NORMAL
MDC_IDC_STAT_EPISODE_TOTAL_COUNT_DTM_START: NORMAL
MDC_IDC_STAT_EPISODE_TYPE: NORMAL
MDC_IDC_STAT_TACHYTHERAPY_RECENT_DTM_END: NORMAL
MDC_IDC_STAT_TACHYTHERAPY_RECENT_DTM_START: NORMAL
MDC_IDC_STAT_TACHYTHERAPY_TOTAL_DTM_END: NORMAL
MDC_IDC_STAT_TACHYTHERAPY_TOTAL_DTM_START: NORMAL

## 2024-11-04 PROCEDURE — 93296 REM INTERROG EVL PM/IDS: CPT | Performed by: INTERNAL MEDICINE

## 2024-11-04 PROCEDURE — 93294 REM INTERROG EVL PM/LDLS PM: CPT | Performed by: INTERNAL MEDICINE

## 2025-01-07 ENCOUNTER — ANCILLARY PROCEDURE (OUTPATIENT)
Dept: CARDIOLOGY | Facility: CLINIC | Age: 70
End: 2025-01-07
Attending: INTERNAL MEDICINE
Payer: COMMERCIAL

## 2025-01-07 ENCOUNTER — OFFICE VISIT (OUTPATIENT)
Dept: CARDIOLOGY | Facility: CLINIC | Age: 70
End: 2025-01-07
Payer: COMMERCIAL

## 2025-01-07 VITALS
RESPIRATION RATE: 16 BRPM | SYSTOLIC BLOOD PRESSURE: 110 MMHG | WEIGHT: 153 LBS | DIASTOLIC BLOOD PRESSURE: 68 MMHG | OXYGEN SATURATION: 98 % | BODY MASS INDEX: 24.32 KG/M2 | HEART RATE: 72 BPM

## 2025-01-07 DIAGNOSIS — I44.2 COMPLETE ATRIOVENTRICULAR BLOCK (H): ICD-10-CM

## 2025-01-07 DIAGNOSIS — E87.6 HYPOKALEMIA: ICD-10-CM

## 2025-01-07 DIAGNOSIS — Z95.0 CARDIAC PACEMAKER IN SITU: Primary | ICD-10-CM

## 2025-01-07 DIAGNOSIS — I10 BENIGN ESSENTIAL HYPERTENSION: ICD-10-CM

## 2025-01-07 DIAGNOSIS — I49.5 SICK SINUS SYNDROME (H): ICD-10-CM

## 2025-01-07 DIAGNOSIS — Z95.0 CARDIAC PACEMAKER IN SITU: ICD-10-CM

## 2025-01-07 DIAGNOSIS — E78.00 HYPERCHOLESTEREMIA: ICD-10-CM

## 2025-01-07 DIAGNOSIS — I48.0 PAROXYSMAL ATRIAL FIBRILLATION (H): ICD-10-CM

## 2025-01-07 DIAGNOSIS — E03.9 ACQUIRED HYPOTHYROIDISM: ICD-10-CM

## 2025-01-07 DIAGNOSIS — G47.33 OBSTRUCTIVE SLEEP APNEA (ADULT) (PEDIATRIC): ICD-10-CM

## 2025-01-07 LAB
ANION GAP SERPL CALCULATED.3IONS-SCNC: 12 MMOL/L (ref 7–15)
BUN SERPL-MCNC: 15.5 MG/DL (ref 8–23)
CALCIUM SERPL-MCNC: 10.1 MG/DL (ref 8.8–10.4)
CHLORIDE SERPL-SCNC: 101 MMOL/L (ref 98–107)
CREAT SERPL-MCNC: 0.79 MG/DL (ref 0.51–0.95)
DIGOXIN SERPL-MCNC: 1.1 NG/ML (ref 0.6–1.2)
EGFRCR SERPLBLD CKD-EPI 2021: 80 ML/MIN/1.73M2
GLUCOSE SERPL-MCNC: 85 MG/DL (ref 70–99)
HCO3 SERPL-SCNC: 28 MMOL/L (ref 22–29)
MDC_IDC_LEAD_CONNECTION_STATUS: NORMAL
MDC_IDC_LEAD_CONNECTION_STATUS: NORMAL
MDC_IDC_LEAD_IMPLANT_DT: NORMAL
MDC_IDC_LEAD_IMPLANT_DT: NORMAL
MDC_IDC_LEAD_LOCATION: NORMAL
MDC_IDC_LEAD_LOCATION: NORMAL
MDC_IDC_LEAD_MFG: NORMAL
MDC_IDC_LEAD_MFG: NORMAL
MDC_IDC_LEAD_MODEL: NORMAL
MDC_IDC_LEAD_MODEL: NORMAL
MDC_IDC_LEAD_POLARITY_TYPE: NORMAL
MDC_IDC_LEAD_POLARITY_TYPE: NORMAL
MDC_IDC_LEAD_SERIAL: NORMAL
MDC_IDC_LEAD_SERIAL: NORMAL
MDC_IDC_MSMT_BATTERY_DTM: NORMAL
MDC_IDC_MSMT_BATTERY_REMAINING_LONGEVITY: 70 MO
MDC_IDC_MSMT_BATTERY_RRT_TRIGGER: 2.62
MDC_IDC_MSMT_BATTERY_STATUS: NORMAL
MDC_IDC_MSMT_BATTERY_VOLTAGE: 2.97 V
MDC_IDC_MSMT_LEADCHNL_RA_IMPEDANCE_VALUE: 380 OHM
MDC_IDC_MSMT_LEADCHNL_RA_IMPEDANCE_VALUE: 418 OHM
MDC_IDC_MSMT_LEADCHNL_RA_PACING_THRESHOLD_AMPLITUDE: 0.5 V
MDC_IDC_MSMT_LEADCHNL_RA_PACING_THRESHOLD_AMPLITUDE: 0.75 V
MDC_IDC_MSMT_LEADCHNL_RA_PACING_THRESHOLD_PULSEWIDTH: 0.4 MS
MDC_IDC_MSMT_LEADCHNL_RA_PACING_THRESHOLD_PULSEWIDTH: 0.4 MS
MDC_IDC_MSMT_LEADCHNL_RA_SENSING_INTR_AMPL: 2.38 MV
MDC_IDC_MSMT_LEADCHNL_RV_IMPEDANCE_VALUE: 361 OHM
MDC_IDC_MSMT_LEADCHNL_RV_IMPEDANCE_VALUE: 456 OHM
MDC_IDC_MSMT_LEADCHNL_RV_PACING_THRESHOLD_AMPLITUDE: 1.5 V
MDC_IDC_MSMT_LEADCHNL_RV_PACING_THRESHOLD_AMPLITUDE: 1.5 V
MDC_IDC_MSMT_LEADCHNL_RV_PACING_THRESHOLD_PULSEWIDTH: 0.4 MS
MDC_IDC_MSMT_LEADCHNL_RV_PACING_THRESHOLD_PULSEWIDTH: 0.4 MS
MDC_IDC_MSMT_LEADCHNL_RV_SENSING_INTR_AMPL: 12.9 MV
MDC_IDC_MSMT_LEADCHNL_RV_SENSING_INTR_AMPL: 13.75 MV
MDC_IDC_PG_IMPLANT_DTM: NORMAL
MDC_IDC_PG_MFG: NORMAL
MDC_IDC_PG_MODEL: NORMAL
MDC_IDC_PG_SERIAL: NORMAL
MDC_IDC_PG_TYPE: NORMAL
MDC_IDC_SESS_CLINIC_NAME: NORMAL
MDC_IDC_SESS_DTM: NORMAL
MDC_IDC_SESS_TYPE: NORMAL
MDC_IDC_SET_BRADY_AT_MODE_SWITCH_RATE: 171 {BEATS}/MIN
MDC_IDC_SET_BRADY_HYSTRATE: NORMAL
MDC_IDC_SET_BRADY_LOWRATE: 70 {BEATS}/MIN
MDC_IDC_SET_BRADY_MAX_SENSOR_RATE: 145 {BEATS}/MIN
MDC_IDC_SET_BRADY_MAX_TRACKING_RATE: 145 {BEATS}/MIN
MDC_IDC_SET_BRADY_MODE: NORMAL
MDC_IDC_SET_BRADY_PAV_DELAY_LOW: 180 MS
MDC_IDC_SET_BRADY_SAV_DELAY_LOW: 150 MS
MDC_IDC_SET_LEADCHNL_RA_PACING_AMPLITUDE: NORMAL
MDC_IDC_SET_LEADCHNL_RA_PACING_ANODE_ELECTRODE_1: NORMAL
MDC_IDC_SET_LEADCHNL_RA_PACING_ANODE_LOCATION_1: NORMAL
MDC_IDC_SET_LEADCHNL_RA_PACING_CAPTURE_MODE: NORMAL
MDC_IDC_SET_LEADCHNL_RA_PACING_CATHODE_ELECTRODE_1: NORMAL
MDC_IDC_SET_LEADCHNL_RA_PACING_CATHODE_LOCATION_1: NORMAL
MDC_IDC_SET_LEADCHNL_RA_PACING_POLARITY: NORMAL
MDC_IDC_SET_LEADCHNL_RA_PACING_PULSEWIDTH: 0.4 MS
MDC_IDC_SET_LEADCHNL_RA_SENSING_ANODE_ELECTRODE_1: NORMAL
MDC_IDC_SET_LEADCHNL_RA_SENSING_ANODE_LOCATION_1: NORMAL
MDC_IDC_SET_LEADCHNL_RA_SENSING_CATHODE_ELECTRODE_1: NORMAL
MDC_IDC_SET_LEADCHNL_RA_SENSING_CATHODE_LOCATION_1: NORMAL
MDC_IDC_SET_LEADCHNL_RA_SENSING_POLARITY: NORMAL
MDC_IDC_SET_LEADCHNL_RA_SENSING_SENSITIVITY: 0.6 MV
MDC_IDC_SET_LEADCHNL_RV_PACING_AMPLITUDE: NORMAL
MDC_IDC_SET_LEADCHNL_RV_PACING_ANODE_ELECTRODE_1: NORMAL
MDC_IDC_SET_LEADCHNL_RV_PACING_ANODE_LOCATION_1: NORMAL
MDC_IDC_SET_LEADCHNL_RV_PACING_CAPTURE_MODE: NORMAL
MDC_IDC_SET_LEADCHNL_RV_PACING_CATHODE_ELECTRODE_1: NORMAL
MDC_IDC_SET_LEADCHNL_RV_PACING_CATHODE_LOCATION_1: NORMAL
MDC_IDC_SET_LEADCHNL_RV_PACING_POLARITY: NORMAL
MDC_IDC_SET_LEADCHNL_RV_PACING_PULSEWIDTH: 0.4 MS
MDC_IDC_SET_LEADCHNL_RV_SENSING_ANODE_ELECTRODE_1: NORMAL
MDC_IDC_SET_LEADCHNL_RV_SENSING_ANODE_LOCATION_1: NORMAL
MDC_IDC_SET_LEADCHNL_RV_SENSING_CATHODE_ELECTRODE_1: NORMAL
MDC_IDC_SET_LEADCHNL_RV_SENSING_CATHODE_LOCATION_1: NORMAL
MDC_IDC_SET_LEADCHNL_RV_SENSING_POLARITY: NORMAL
MDC_IDC_SET_LEADCHNL_RV_SENSING_SENSITIVITY: 0.9 MV
MDC_IDC_SET_ZONE_DETECTION_INTERVAL: 350 MS
MDC_IDC_SET_ZONE_DETECTION_INTERVAL: 400 MS
MDC_IDC_SET_ZONE_STATUS: NORMAL
MDC_IDC_SET_ZONE_STATUS: NORMAL
MDC_IDC_SET_ZONE_TYPE: NORMAL
MDC_IDC_SET_ZONE_VENDOR_TYPE: NORMAL
MDC_IDC_STAT_AT_BURDEN_PERCENT: 0 %
MDC_IDC_STAT_AT_DTM_END: NORMAL
MDC_IDC_STAT_AT_DTM_START: NORMAL
MDC_IDC_STAT_AT_MODE_SW_COUNT: 0
MDC_IDC_STAT_BRADY_AP_VP_PERCENT: 27.02 %
MDC_IDC_STAT_BRADY_AP_VS_PERCENT: 70.83 %
MDC_IDC_STAT_BRADY_AS_VP_PERCENT: 1.44 %
MDC_IDC_STAT_BRADY_AS_VS_PERCENT: 0.71 %
MDC_IDC_STAT_BRADY_DTM_END: NORMAL
MDC_IDC_STAT_BRADY_DTM_START: NORMAL
MDC_IDC_STAT_BRADY_RA_PERCENT_PACED: 97 %
MDC_IDC_STAT_BRADY_RV_PERCENT_PACED: 29 %
MDC_IDC_STAT_EPISODE_RECENT_COUNT: 0
MDC_IDC_STAT_EPISODE_RECENT_COUNT: 3
MDC_IDC_STAT_EPISODE_RECENT_COUNT_DTM_END: NORMAL
MDC_IDC_STAT_EPISODE_RECENT_COUNT_DTM_START: NORMAL
MDC_IDC_STAT_EPISODE_TOTAL_COUNT: 0
MDC_IDC_STAT_EPISODE_TOTAL_COUNT: 17
MDC_IDC_STAT_EPISODE_TOTAL_COUNT: 54
MDC_IDC_STAT_EPISODE_TOTAL_COUNT_DTM_END: NORMAL
MDC_IDC_STAT_EPISODE_TOTAL_COUNT_DTM_START: NORMAL
MDC_IDC_STAT_EPISODE_TYPE: NORMAL
MDC_IDC_STAT_TACHYTHERAPY_RECENT_DTM_END: NORMAL
MDC_IDC_STAT_TACHYTHERAPY_RECENT_DTM_START: NORMAL
MDC_IDC_STAT_TACHYTHERAPY_TOTAL_DTM_END: NORMAL
MDC_IDC_STAT_TACHYTHERAPY_TOTAL_DTM_START: NORMAL
POTASSIUM SERPL-SCNC: 3.4 MMOL/L (ref 3.4–5.3)
SODIUM SERPL-SCNC: 141 MMOL/L (ref 135–145)
TSH SERPL DL<=0.005 MIU/L-ACNC: 1.64 UIU/ML (ref 0.3–4.2)

## 2025-01-07 PROCEDURE — 99214 OFFICE O/P EST MOD 30 MIN: CPT | Performed by: INTERNAL MEDICINE

## 2025-01-07 PROCEDURE — 36415 COLL VENOUS BLD VENIPUNCTURE: CPT | Performed by: INTERNAL MEDICINE

## 2025-01-07 PROCEDURE — 80048 BASIC METABOLIC PNL TOTAL CA: CPT | Performed by: INTERNAL MEDICINE

## 2025-01-07 PROCEDURE — 84443 ASSAY THYROID STIM HORMONE: CPT | Performed by: INTERNAL MEDICINE

## 2025-01-07 PROCEDURE — G2211 COMPLEX E/M VISIT ADD ON: HCPCS | Performed by: INTERNAL MEDICINE

## 2025-01-07 PROCEDURE — 80162 ASSAY OF DIGOXIN TOTAL: CPT | Performed by: INTERNAL MEDICINE

## 2025-01-07 RX ORDER — LEVOTHYROXINE SODIUM 100 UG/1
100 TABLET ORAL
COMMUNITY
Start: 2024-10-12

## 2025-01-07 NOTE — LETTER
January 8, 2025    Chaya Bean  [address]   [facility] [address]    Dear ,    We are writing to inform you of your test results.  Nice to see you yesterday.  All the blood work looks great, thyroid is normal, digoxin level is normal, potassium is minimally low, not enough that I am concerned about.  I would continue the same medications.    Resulted Orders   Basic metabolic panel   Result Value Ref Range    Sodium 141 135 - 145 mmol/L    Potassium 3.4 3.4 - 5.3 mmol/L    Chloride 101 98 - 107 mmol/L    Carbon Dioxide (CO2) 28 22 - 29 mmol/L    Anion Gap 12 7 - 15 mmol/L    Urea Nitrogen 15.5 8.0 - 23.0 mg/dL    Creatinine 0.79 0.51 - 0.95 mg/dL    GFR Estimate 80 >60 mL/min/1.73m2      Comment:      eGFR calculated using 2021 CKD-EPI equation.    Calcium 10.1 8.8 - 10.4 mg/dL    Glucose 85 70 - 99 mg/dL   TSH with free T4 reflex   Result Value Ref Range    TSH 1.64 0.30 - 4.20 uIU/mL   Digoxin level   Result Value Ref Range    Digoxin 1.1 0.6 - 1.2 ng/mL       If you have any questions or concerns, please call the clinic at the number listed above.       Sincerely,      Kinjal Wick MD    Electronically signed

## 2025-01-07 NOTE — PATIENT INSTRUCTIONS
Ms Larkin GORDON Love,  I enjoyed visiting with you again today.  I am glad to hear you are doing well.  Per our conversation given the low potassium as well as abnormal thyroid function I will recheck the blood work today looking at potassium, thyroid as well as digoxin blood level.  In any event, let us try the digoxin only every other day and let me know if issues with it at [phone].  I will plan on seeing you 1 year or sooner if needed.  Jacob Wick

## 2025-01-07 NOTE — LETTER
1/7/2025    Jai Proctor MD  [address]  [place] [address]    RE: Chaya Bean       Dear Colleague,     I had the pleasure of seeing Chaya Bean in the [facility].      [facility]  Heart Care Clinic Follow-up Note    Assessment & Plan        (Z95.0) Cardiac pacemaker, dual, in situ  (primary encounter diagnosis)  Comment: Due to sick sinus syndrome and left bundle branch block had placement of pacemaker 1993, Medtronic device with generator change around 2008 with Medtronic right atrial and right ventricular leads.  Device check today shows 97% atrial pacing with 29% ventricular pacing with good rate histograms and no major arrhythmias.     (G47.33) Obstructive Sleep Apnea  Comment: Nightly CPAP not tolerated, due to fear of infections, she is not utilizing anymore.  Defer to her sleep doctor.     (E78.00) Hypercholesteremia  Comment: Cholesterol 152 with LDL of 72 on rosuvastatin 10 mg.     (I10) Benign essential hypertension  Comment: Under good control currently on HCTZ.  Has developed hypokalemia and we will recheck today.     (I48.0) Paroxysmal atrial fibrillation (H)  Comment: Brief episodes seen, all minimal, at this point time no anticoagulation but will continue to monitor.  Most likely secondary to hypokalemia as well as hyperthyroidism.    (E03.9) Acquired hypothyroidism  Comment: TSH significantly low at 0.02 in October, will recheck today.    (E87.6) Hypokalemia  Comment: Level was 3.2 in July, will recheck today.    Plan  1.  Given age will decrease digoxin every other day.  Check digoxin blood level today.  2.  Check renal profile today given low potassium.  3.  Check TSH today given low TSH.  4.  Informed primary and patient of the results of these blood test.  5.  Follow-up with me in a year with device check or sooner if needed.    The longitudinal plan of care for the diagnosis(es)/condition(s) as documented were addressed during this  visit. Due to the added complexity in care, I will continue to support Chaya in the subsequent management and with ongoing continuity of care.     Subjective  CC: 69-year-old white female here for yearly follow-up, still living independently.  Still tries to go for daily walks.  In general no issues with chest pains, palpitations, shortness of breath, PND, orthopnea, syncope, dizziness or peripheral edema.    Medications  Current Outpatient Medications   Medication Sig Dispense Refill     aspirin 81 MG EC tablet [ASPIRIN 81 MG EC TABLET] Take 81 mg by mouth daily.       citalopram (CELEXA) 40 MG tablet [CITALOPRAM (CELEXA) 40 MG TABLET] Take 60 mg by mouth daily.       digoxin (LANOXIN) 250 mcg tablet [DIGOXIN (LANOXIN) 250 MCG TABLET] Take 250 mcg by mouth every morning.       hydrochlorothiazide (HYDRODIURIL) 25 MG tablet [HYDROCHLOROTHIAZIDE (HYDRODIURIL) 25 MG TABLET] Take 25 mg by mouth daily.       levETIRAcetam (KEPPRA) 250 MG tablet TAKE 1 TABLET(250 MG) BY MOUTH TWICE DAILY 180 tablet 3     levothyroxine (SYNTHROID/LEVOTHROID) 100 MCG tablet Take 100 mcg by mouth every morning (before breakfast).       phenytoin (DILANTIN) 100 MG ER capsule TAKE 1 CAPSULE BY MOUTH EVERY MORNING AND 2 CAPSULES AT BEDTIME 270 capsule 3     potassium chloride ER (K-TAB) 20 MEQ CR tablet Take 1 tablet (20 mEq) by mouth 2 times daily 180 tablet 0     rosuvastatin (CRESTOR) 10 MG tablet 1 tablet Orally Once a day for cholesterol;         Objective  /68 (BP Location: Right arm, Patient Position: Sitting, Cuff Size: Adult Regular)   Pulse 72   Resp 16   Wt 69.4 kg (153 lb)   SpO2 98%   BMI 24.32 kg/m      General Appearance:    Alert, cooperative, no distress, appears stated age   Head:    Normocephalic, without obvious abnormality, atraumatic   Throat:   Lips, mucosa, and tongue normal; teeth and gums normal   Neck:   Supple, symmetrical, trachea midline, no adenopathy;        thyroid:  No enlargement/tenderness/nodules;  "no carotid    bruit or JVD   Back:     Symmetric, no curvature, ROM normal, no CVA tenderness   Lungs:     Clear to auscultation bilaterally, respirations unlabored   Chest wall:    No tenderness, left-sided pacer   Heart:    Regular rate and rhythm, S1 and S2 normal, no murmur, rub   or gallop   Abdomen:     Soft, non-tender, bowel sounds active all four quadrants,     no masses, no organomegaly   Extremities:   Normal, atraumatic, no cyanosis or edema   Pulses:   2+ and symmetric all extremities   Skin:   Skin color, texture, turgor normal, no rashes or lesions     Results    Lab Results personally reviewed   Lab Results   Component Value Date    CHOL 152 07/10/2024    CHOL 161 07/12/2023     Lab Results   Component Value Date    HDL 60 07/10/2024    HDL 66 07/12/2023     No components found for: \"LDLCALC\"  Lab Results   Component Value Date    TRIG 101 07/10/2024    TRIG 102 07/12/2023     Lab Results   Component Value Date    WBC 4.9 07/10/2024    HGB 13.0 07/10/2024    HCT 37.0 07/10/2024     07/10/2024     Lab Results   Component Value Date    BUN 15.7 07/10/2024     07/10/2024    CO2 25 07/10/2024               Thank you for allowing me to participate in the care of your patient.      Sincerely,     BARBRA WEST MD     [facility]  cc:   Justa Manrique PA-C  [address] [place] [address]      "

## 2025-01-07 NOTE — PROGRESS NOTES
[facility]  Heart Care Clinic Follow-up Note    Assessment & Plan        (Z95.0) Cardiac pacemaker, dual, in situ  (primary encounter diagnosis)  Comment: Due to sick sinus syndrome and left bundle branch block had placement of pacemaker 1993, Medtronic device with generator change around 2008 with Medtronic right atrial and right ventricular leads.  Device check today shows 97% atrial pacing with 29% ventricular pacing with good rate histograms and no major arrhythmias.     (G47.33) Obstructive Sleep Apnea  Comment: Nightly CPAP not tolerated, due to fear of infections, she is not utilizing anymore.  Defer to her sleep doctor.     (E78.00) Hypercholesteremia  Comment: Cholesterol 152 with LDL of 72 on rosuvastatin 10 mg.     (I10) Benign essential hypertension  Comment: Under good control currently on HCTZ.  Has developed hypokalemia and we will recheck today.     (I48.0) Paroxysmal atrial fibrillation (H)  Comment: Brief episodes seen, all minimal, at this point time no anticoagulation but will continue to monitor.  Most likely secondary to hypokalemia as well as hyperthyroidism.    (E03.9) Acquired hypothyroidism  Comment: TSH significantly low at 0.02 in October, will recheck today.    (E87.6) Hypokalemia  Comment: Level was 3.2 in July, will recheck today.    Plan  1.  Given age will decrease digoxin every other day.  Check digoxin blood level today.  2.  Check renal profile today given low potassium.  3.  Check TSH today given low TSH.  4.  Informed primary and patient of the results of these blood test.  5.  Follow-up with me in a year with device check or sooner if needed.    The longitudinal plan of care for the diagnosis(es)/condition(s) as documented were addressed during this visit. Due to the added complexity in care, I will continue to support Chaya in the subsequent management and with ongoing continuity of care.     Subjective  CC: 69-year-old white female here for yearly follow-up, still  living independently.  Still tries to go for daily walks.  In general no issues with chest pains, palpitations, shortness of breath, PND, orthopnea, syncope, dizziness or peripheral edema.    Medications  Current Outpatient Medications   Medication Sig Dispense Refill    aspirin 81 MG EC tablet [ASPIRIN 81 MG EC TABLET] Take 81 mg by mouth daily.      citalopram (CELEXA) 40 MG tablet [CITALOPRAM (CELEXA) 40 MG TABLET] Take 60 mg by mouth daily.      digoxin (LANOXIN) 250 mcg tablet [DIGOXIN (LANOXIN) 250 MCG TABLET] Take 250 mcg by mouth every morning.      hydrochlorothiazide (HYDRODIURIL) 25 MG tablet [HYDROCHLOROTHIAZIDE (HYDRODIURIL) 25 MG TABLET] Take 25 mg by mouth daily.      levETIRAcetam (KEPPRA) 250 MG tablet TAKE 1 TABLET(250 MG) BY MOUTH TWICE DAILY 180 tablet 3    levothyroxine (SYNTHROID/LEVOTHROID) 100 MCG tablet Take 100 mcg by mouth every morning (before breakfast).      phenytoin (DILANTIN) 100 MG ER capsule TAKE 1 CAPSULE BY MOUTH EVERY MORNING AND 2 CAPSULES AT BEDTIME 270 capsule 3    potassium chloride ER (K-TAB) 20 MEQ CR tablet Take 1 tablet (20 mEq) by mouth 2 times daily 180 tablet 0    rosuvastatin (CRESTOR) 10 MG tablet 1 tablet Orally Once a day for cholesterol;         Objective  /68 (BP Location: Right arm, Patient Position: Sitting, Cuff Size: Adult Regular)   Pulse 72   Resp 16   Wt 69.4 kg (153 lb)   SpO2 98%   BMI 24.32 kg/m      General Appearance:    Alert, cooperative, no distress, appears stated age   Head:    Normocephalic, without obvious abnormality, atraumatic   Throat:   Lips, mucosa, and tongue normal; teeth and gums normal   Neck:   Supple, symmetrical, trachea midline, no adenopathy;        thyroid:  No enlargement/tenderness/nodules; no carotid    bruit or JVD   Back:     Symmetric, no curvature, ROM normal, no CVA tenderness   Lungs:     Clear to auscultation bilaterally, respirations unlabored   Chest wall:    No tenderness, left-sided pacer   Heart:     "Regular rate and rhythm, S1 and S2 normal, no murmur, rub   or gallop   Abdomen:     Soft, non-tender, bowel sounds active all four quadrants,     no masses, no organomegaly   Extremities:   Normal, atraumatic, no cyanosis or edema   Pulses:   2+ and symmetric all extremities   Skin:   Skin color, texture, turgor normal, no rashes or lesions     Results    Lab Results personally reviewed   Lab Results   Component Value Date    CHOL 152 07/10/2024    CHOL 161 07/12/2023     Lab Results   Component Value Date    HDL 60 07/10/2024    HDL 66 07/12/2023     No components found for: \"LDLCALC\"  Lab Results   Component Value Date    TRIG 101 07/10/2024    TRIG 102 07/12/2023     Lab Results   Component Value Date    WBC 4.9 07/10/2024    HGB 13.0 07/10/2024    HCT 37.0 07/10/2024     07/10/2024     Lab Results   Component Value Date    BUN 15.7 07/10/2024     07/10/2024    CO2 25 07/10/2024           "

## 2025-01-10 LAB
MDC_IDC_LEAD_CONNECTION_STATUS: NORMAL
MDC_IDC_LEAD_CONNECTION_STATUS: NORMAL
MDC_IDC_LEAD_IMPLANT_DT: NORMAL
MDC_IDC_LEAD_IMPLANT_DT: NORMAL
MDC_IDC_LEAD_LOCATION: NORMAL
MDC_IDC_LEAD_LOCATION: NORMAL
MDC_IDC_LEAD_MFG: NORMAL
MDC_IDC_LEAD_MFG: NORMAL
MDC_IDC_LEAD_MODEL: NORMAL
MDC_IDC_LEAD_MODEL: NORMAL
MDC_IDC_LEAD_POLARITY_TYPE: NORMAL
MDC_IDC_LEAD_POLARITY_TYPE: NORMAL
MDC_IDC_LEAD_SERIAL: NORMAL
MDC_IDC_LEAD_SERIAL: NORMAL
MDC_IDC_MSMT_BATTERY_DTM: NORMAL
MDC_IDC_MSMT_BATTERY_REMAINING_LONGEVITY: 70 MO
MDC_IDC_MSMT_BATTERY_RRT_TRIGGER: 2.62
MDC_IDC_MSMT_BATTERY_STATUS: NORMAL
MDC_IDC_MSMT_BATTERY_VOLTAGE: 2.97 V
MDC_IDC_MSMT_LEADCHNL_RA_IMPEDANCE_VALUE: 380 OHM
MDC_IDC_MSMT_LEADCHNL_RA_IMPEDANCE_VALUE: 418 OHM
MDC_IDC_MSMT_LEADCHNL_RA_PACING_THRESHOLD_AMPLITUDE: 0.5 V
MDC_IDC_MSMT_LEADCHNL_RA_PACING_THRESHOLD_AMPLITUDE: 0.75 V
MDC_IDC_MSMT_LEADCHNL_RA_PACING_THRESHOLD_PULSEWIDTH: 0.4 MS
MDC_IDC_MSMT_LEADCHNL_RA_PACING_THRESHOLD_PULSEWIDTH: 0.4 MS
MDC_IDC_MSMT_LEADCHNL_RA_SENSING_INTR_AMPL: 2.38 MV
MDC_IDC_MSMT_LEADCHNL_RV_IMPEDANCE_VALUE: 361 OHM
MDC_IDC_MSMT_LEADCHNL_RV_IMPEDANCE_VALUE: 456 OHM
MDC_IDC_MSMT_LEADCHNL_RV_PACING_THRESHOLD_AMPLITUDE: 1.5 V
MDC_IDC_MSMT_LEADCHNL_RV_PACING_THRESHOLD_AMPLITUDE: 1.5 V
MDC_IDC_MSMT_LEADCHNL_RV_PACING_THRESHOLD_PULSEWIDTH: 0.4 MS
MDC_IDC_MSMT_LEADCHNL_RV_PACING_THRESHOLD_PULSEWIDTH: 0.4 MS
MDC_IDC_MSMT_LEADCHNL_RV_SENSING_INTR_AMPL: 12.9 MV
MDC_IDC_MSMT_LEADCHNL_RV_SENSING_INTR_AMPL: 13.75 MV
MDC_IDC_PG_IMPLANT_DTM: NORMAL
MDC_IDC_PG_MFG: NORMAL
MDC_IDC_PG_MODEL: NORMAL
MDC_IDC_PG_SERIAL: NORMAL
MDC_IDC_PG_TYPE: NORMAL
MDC_IDC_SESS_CLINIC_NAME: NORMAL
MDC_IDC_SESS_DTM: NORMAL
MDC_IDC_SESS_TYPE: NORMAL
MDC_IDC_SET_BRADY_AT_MODE_SWITCH_RATE: 171 {BEATS}/MIN
MDC_IDC_SET_BRADY_HYSTRATE: NORMAL
MDC_IDC_SET_BRADY_LOWRATE: 70 {BEATS}/MIN
MDC_IDC_SET_BRADY_MAX_SENSOR_RATE: 145 {BEATS}/MIN
MDC_IDC_SET_BRADY_MAX_TRACKING_RATE: 145 {BEATS}/MIN
MDC_IDC_SET_BRADY_MODE: NORMAL
MDC_IDC_SET_BRADY_PAV_DELAY_LOW: 180 MS
MDC_IDC_SET_BRADY_SAV_DELAY_LOW: 150 MS
MDC_IDC_SET_LEADCHNL_RA_PACING_AMPLITUDE: NORMAL
MDC_IDC_SET_LEADCHNL_RA_PACING_ANODE_ELECTRODE_1: NORMAL
MDC_IDC_SET_LEADCHNL_RA_PACING_ANODE_LOCATION_1: NORMAL
MDC_IDC_SET_LEADCHNL_RA_PACING_CAPTURE_MODE: NORMAL
MDC_IDC_SET_LEADCHNL_RA_PACING_CATHODE_ELECTRODE_1: NORMAL
MDC_IDC_SET_LEADCHNL_RA_PACING_CATHODE_LOCATION_1: NORMAL
MDC_IDC_SET_LEADCHNL_RA_PACING_POLARITY: NORMAL
MDC_IDC_SET_LEADCHNL_RA_PACING_PULSEWIDTH: 0.4 MS
MDC_IDC_SET_LEADCHNL_RA_SENSING_ANODE_ELECTRODE_1: NORMAL
MDC_IDC_SET_LEADCHNL_RA_SENSING_ANODE_LOCATION_1: NORMAL
MDC_IDC_SET_LEADCHNL_RA_SENSING_CATHODE_ELECTRODE_1: NORMAL
MDC_IDC_SET_LEADCHNL_RA_SENSING_CATHODE_LOCATION_1: NORMAL
MDC_IDC_SET_LEADCHNL_RA_SENSING_POLARITY: NORMAL
MDC_IDC_SET_LEADCHNL_RA_SENSING_SENSITIVITY: 0.6 MV
MDC_IDC_SET_LEADCHNL_RV_PACING_AMPLITUDE: NORMAL
MDC_IDC_SET_LEADCHNL_RV_PACING_ANODE_ELECTRODE_1: NORMAL
MDC_IDC_SET_LEADCHNL_RV_PACING_ANODE_LOCATION_1: NORMAL
MDC_IDC_SET_LEADCHNL_RV_PACING_CAPTURE_MODE: NORMAL
MDC_IDC_SET_LEADCHNL_RV_PACING_CATHODE_ELECTRODE_1: NORMAL
MDC_IDC_SET_LEADCHNL_RV_PACING_CATHODE_LOCATION_1: NORMAL
MDC_IDC_SET_LEADCHNL_RV_PACING_POLARITY: NORMAL
MDC_IDC_SET_LEADCHNL_RV_PACING_PULSEWIDTH: 0.4 MS
MDC_IDC_SET_LEADCHNL_RV_SENSING_ANODE_ELECTRODE_1: NORMAL
MDC_IDC_SET_LEADCHNL_RV_SENSING_ANODE_LOCATION_1: NORMAL
MDC_IDC_SET_LEADCHNL_RV_SENSING_CATHODE_ELECTRODE_1: NORMAL
MDC_IDC_SET_LEADCHNL_RV_SENSING_CATHODE_LOCATION_1: NORMAL
MDC_IDC_SET_LEADCHNL_RV_SENSING_POLARITY: NORMAL
MDC_IDC_SET_LEADCHNL_RV_SENSING_SENSITIVITY: 0.9 MV
MDC_IDC_SET_ZONE_DETECTION_INTERVAL: 350 MS
MDC_IDC_SET_ZONE_DETECTION_INTERVAL: 400 MS
MDC_IDC_SET_ZONE_STATUS: NORMAL
MDC_IDC_SET_ZONE_STATUS: NORMAL
MDC_IDC_SET_ZONE_TYPE: NORMAL
MDC_IDC_SET_ZONE_VENDOR_TYPE: NORMAL
MDC_IDC_STAT_AT_BURDEN_PERCENT: 0 %
MDC_IDC_STAT_AT_DTM_END: NORMAL
MDC_IDC_STAT_AT_DTM_START: NORMAL
MDC_IDC_STAT_AT_MODE_SW_COUNT: 0
MDC_IDC_STAT_BRADY_AP_VP_PERCENT: 27.02 %
MDC_IDC_STAT_BRADY_AP_VS_PERCENT: 70.83 %
MDC_IDC_STAT_BRADY_AS_VP_PERCENT: 1.44 %
MDC_IDC_STAT_BRADY_AS_VS_PERCENT: 0.71 %
MDC_IDC_STAT_BRADY_DTM_END: NORMAL
MDC_IDC_STAT_BRADY_DTM_START: NORMAL
MDC_IDC_STAT_BRADY_RA_PERCENT_PACED: 97 %
MDC_IDC_STAT_BRADY_RV_PERCENT_PACED: 29 %
MDC_IDC_STAT_EPISODE_RECENT_COUNT: 0
MDC_IDC_STAT_EPISODE_RECENT_COUNT: 3
MDC_IDC_STAT_EPISODE_RECENT_COUNT_DTM_END: NORMAL
MDC_IDC_STAT_EPISODE_RECENT_COUNT_DTM_START: NORMAL
MDC_IDC_STAT_EPISODE_TOTAL_COUNT: 0
MDC_IDC_STAT_EPISODE_TOTAL_COUNT: 17
MDC_IDC_STAT_EPISODE_TOTAL_COUNT: 54
MDC_IDC_STAT_EPISODE_TOTAL_COUNT_DTM_END: NORMAL
MDC_IDC_STAT_EPISODE_TOTAL_COUNT_DTM_START: NORMAL
MDC_IDC_STAT_EPISODE_TYPE: NORMAL
MDC_IDC_STAT_TACHYTHERAPY_RECENT_DTM_END: NORMAL
MDC_IDC_STAT_TACHYTHERAPY_RECENT_DTM_START: NORMAL
MDC_IDC_STAT_TACHYTHERAPY_TOTAL_DTM_END: NORMAL
MDC_IDC_STAT_TACHYTHERAPY_TOTAL_DTM_START: NORMAL

## 2025-01-10 PROCEDURE — 93280 PM DEVICE PROGR EVAL DUAL: CPT | Performed by: INTERNAL MEDICINE

## 2025-01-25 ENCOUNTER — HEALTH MAINTENANCE LETTER (OUTPATIENT)
Age: 70
End: 2025-01-25

## 2025-04-10 ENCOUNTER — ANCILLARY PROCEDURE (OUTPATIENT)
Dept: CARDIOLOGY | Facility: CLINIC | Age: 70
End: 2025-04-10
Attending: INTERNAL MEDICINE
Payer: COMMERCIAL

## 2025-04-10 DIAGNOSIS — I44.2 COMPLETE ATRIOVENTRICULAR BLOCK (H): ICD-10-CM

## 2025-04-10 DIAGNOSIS — Z95.0 CARDIAC PACEMAKER IN SITU: ICD-10-CM

## 2025-04-10 DIAGNOSIS — I49.5 SICK SINUS SYNDROME (H): ICD-10-CM

## 2025-04-10 LAB
MDC_IDC_LEAD_CONNECTION_STATUS: NORMAL
MDC_IDC_LEAD_CONNECTION_STATUS: NORMAL
MDC_IDC_LEAD_IMPLANT_DT: NORMAL
MDC_IDC_LEAD_IMPLANT_DT: NORMAL
MDC_IDC_LEAD_LOCATION: NORMAL
MDC_IDC_LEAD_LOCATION: NORMAL
MDC_IDC_LEAD_MFG: NORMAL
MDC_IDC_LEAD_MFG: NORMAL
MDC_IDC_LEAD_MODEL: NORMAL
MDC_IDC_LEAD_MODEL: NORMAL
MDC_IDC_LEAD_POLARITY_TYPE: NORMAL
MDC_IDC_LEAD_POLARITY_TYPE: NORMAL
MDC_IDC_LEAD_SERIAL: NORMAL
MDC_IDC_LEAD_SERIAL: NORMAL
MDC_IDC_MSMT_BATTERY_DTM: NORMAL
MDC_IDC_MSMT_BATTERY_REMAINING_LONGEVITY: 69 MO
MDC_IDC_MSMT_BATTERY_RRT_TRIGGER: 2.62
MDC_IDC_MSMT_BATTERY_STATUS: NORMAL
MDC_IDC_MSMT_BATTERY_VOLTAGE: 2.96 V
MDC_IDC_MSMT_LEADCHNL_RA_IMPEDANCE_VALUE: 323 OHM
MDC_IDC_MSMT_LEADCHNL_RA_IMPEDANCE_VALUE: 361 OHM
MDC_IDC_MSMT_LEADCHNL_RA_PACING_THRESHOLD_AMPLITUDE: 0.5 V
MDC_IDC_MSMT_LEADCHNL_RA_PACING_THRESHOLD_PULSEWIDTH: 0.4 MS
MDC_IDC_MSMT_LEADCHNL_RA_SENSING_INTR_AMPL: 1.88 MV
MDC_IDC_MSMT_LEADCHNL_RA_SENSING_INTR_AMPL: 1.88 MV
MDC_IDC_MSMT_LEADCHNL_RV_IMPEDANCE_VALUE: 342 OHM
MDC_IDC_MSMT_LEADCHNL_RV_IMPEDANCE_VALUE: 475 OHM
MDC_IDC_MSMT_LEADCHNL_RV_PACING_THRESHOLD_AMPLITUDE: 1.75 V
MDC_IDC_MSMT_LEADCHNL_RV_PACING_THRESHOLD_PULSEWIDTH: 0.4 MS
MDC_IDC_MSMT_LEADCHNL_RV_SENSING_INTR_AMPL: 13.75 MV
MDC_IDC_MSMT_LEADCHNL_RV_SENSING_INTR_AMPL: 13.75 MV
MDC_IDC_PG_IMPLANT_DTM: NORMAL
MDC_IDC_PG_MFG: NORMAL
MDC_IDC_PG_MODEL: NORMAL
MDC_IDC_PG_SERIAL: NORMAL
MDC_IDC_PG_TYPE: NORMAL
MDC_IDC_SESS_CLINIC_NAME: NORMAL
MDC_IDC_SESS_DTM: NORMAL
MDC_IDC_SESS_TYPE: NORMAL
MDC_IDC_SET_BRADY_AT_MODE_SWITCH_RATE: 171 {BEATS}/MIN
MDC_IDC_SET_BRADY_HYSTRATE: NORMAL
MDC_IDC_SET_BRADY_LOWRATE: 70 {BEATS}/MIN
MDC_IDC_SET_BRADY_MAX_SENSOR_RATE: 145 {BEATS}/MIN
MDC_IDC_SET_BRADY_MAX_TRACKING_RATE: 145 {BEATS}/MIN
MDC_IDC_SET_BRADY_MODE: NORMAL
MDC_IDC_SET_BRADY_PAV_DELAY_LOW: 180 MS
MDC_IDC_SET_BRADY_SAV_DELAY_LOW: 150 MS
MDC_IDC_SET_LEADCHNL_RA_PACING_AMPLITUDE: 1.5 V
MDC_IDC_SET_LEADCHNL_RA_PACING_ANODE_ELECTRODE_1: NORMAL
MDC_IDC_SET_LEADCHNL_RA_PACING_ANODE_LOCATION_1: NORMAL
MDC_IDC_SET_LEADCHNL_RA_PACING_CAPTURE_MODE: NORMAL
MDC_IDC_SET_LEADCHNL_RA_PACING_CATHODE_ELECTRODE_1: NORMAL
MDC_IDC_SET_LEADCHNL_RA_PACING_CATHODE_LOCATION_1: NORMAL
MDC_IDC_SET_LEADCHNL_RA_PACING_POLARITY: NORMAL
MDC_IDC_SET_LEADCHNL_RA_PACING_PULSEWIDTH: 0.4 MS
MDC_IDC_SET_LEADCHNL_RA_SENSING_ANODE_ELECTRODE_1: NORMAL
MDC_IDC_SET_LEADCHNL_RA_SENSING_ANODE_LOCATION_1: NORMAL
MDC_IDC_SET_LEADCHNL_RA_SENSING_CATHODE_ELECTRODE_1: NORMAL
MDC_IDC_SET_LEADCHNL_RA_SENSING_CATHODE_LOCATION_1: NORMAL
MDC_IDC_SET_LEADCHNL_RA_SENSING_POLARITY: NORMAL
MDC_IDC_SET_LEADCHNL_RA_SENSING_SENSITIVITY: 0.6 MV
MDC_IDC_SET_LEADCHNL_RV_PACING_AMPLITUDE: 2.75 V
MDC_IDC_SET_LEADCHNL_RV_PACING_ANODE_ELECTRODE_1: NORMAL
MDC_IDC_SET_LEADCHNL_RV_PACING_ANODE_LOCATION_1: NORMAL
MDC_IDC_SET_LEADCHNL_RV_PACING_CAPTURE_MODE: NORMAL
MDC_IDC_SET_LEADCHNL_RV_PACING_CATHODE_ELECTRODE_1: NORMAL
MDC_IDC_SET_LEADCHNL_RV_PACING_CATHODE_LOCATION_1: NORMAL
MDC_IDC_SET_LEADCHNL_RV_PACING_POLARITY: NORMAL
MDC_IDC_SET_LEADCHNL_RV_PACING_PULSEWIDTH: 0.4 MS
MDC_IDC_SET_LEADCHNL_RV_SENSING_ANODE_ELECTRODE_1: NORMAL
MDC_IDC_SET_LEADCHNL_RV_SENSING_ANODE_LOCATION_1: NORMAL
MDC_IDC_SET_LEADCHNL_RV_SENSING_CATHODE_ELECTRODE_1: NORMAL
MDC_IDC_SET_LEADCHNL_RV_SENSING_CATHODE_LOCATION_1: NORMAL
MDC_IDC_SET_LEADCHNL_RV_SENSING_POLARITY: NORMAL
MDC_IDC_SET_LEADCHNL_RV_SENSING_SENSITIVITY: 0.9 MV
MDC_IDC_SET_ZONE_DETECTION_INTERVAL: 350 MS
MDC_IDC_SET_ZONE_DETECTION_INTERVAL: 400 MS
MDC_IDC_SET_ZONE_STATUS: NORMAL
MDC_IDC_SET_ZONE_STATUS: NORMAL
MDC_IDC_SET_ZONE_TYPE: NORMAL
MDC_IDC_SET_ZONE_VENDOR_TYPE: NORMAL
MDC_IDC_STAT_AT_BURDEN_PERCENT: 0 %
MDC_IDC_STAT_AT_DTM_END: NORMAL
MDC_IDC_STAT_AT_DTM_START: NORMAL
MDC_IDC_STAT_BRADY_AP_VP_PERCENT: 7.48 %
MDC_IDC_STAT_BRADY_AP_VS_PERCENT: 91.86 %
MDC_IDC_STAT_BRADY_AS_VP_PERCENT: 0.03 %
MDC_IDC_STAT_BRADY_AS_VS_PERCENT: 0.63 %
MDC_IDC_STAT_BRADY_DTM_END: NORMAL
MDC_IDC_STAT_BRADY_DTM_START: NORMAL
MDC_IDC_STAT_BRADY_RA_PERCENT_PACED: 99.12 %
MDC_IDC_STAT_BRADY_RV_PERCENT_PACED: 7.51 %
MDC_IDC_STAT_EPISODE_RECENT_COUNT: 0
MDC_IDC_STAT_EPISODE_RECENT_COUNT_DTM_END: NORMAL
MDC_IDC_STAT_EPISODE_RECENT_COUNT_DTM_START: NORMAL
MDC_IDC_STAT_EPISODE_TOTAL_COUNT: 0
MDC_IDC_STAT_EPISODE_TOTAL_COUNT: 17
MDC_IDC_STAT_EPISODE_TOTAL_COUNT: 54
MDC_IDC_STAT_EPISODE_TOTAL_COUNT_DTM_END: NORMAL
MDC_IDC_STAT_EPISODE_TOTAL_COUNT_DTM_START: NORMAL
MDC_IDC_STAT_EPISODE_TYPE: NORMAL
MDC_IDC_STAT_TACHYTHERAPY_RECENT_DTM_END: NORMAL
MDC_IDC_STAT_TACHYTHERAPY_RECENT_DTM_START: NORMAL
MDC_IDC_STAT_TACHYTHERAPY_TOTAL_DTM_END: NORMAL
MDC_IDC_STAT_TACHYTHERAPY_TOTAL_DTM_START: NORMAL

## 2025-04-11 PROCEDURE — 93296 REM INTERROG EVL PM/IDS: CPT | Performed by: INTERNAL MEDICINE

## 2025-04-11 PROCEDURE — 93294 REM INTERROG EVL PM/LDLS PM: CPT | Performed by: INTERNAL MEDICINE
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
## 2025-04-16 ENCOUNTER — LAB (OUTPATIENT)
Dept: LAB | Facility: HOSPITAL | Age: 70
End: 2025-04-16
Payer: COMMERCIAL

## 2025-04-16 ENCOUNTER — OFFICE VISIT (OUTPATIENT)
Dept: NEUROLOGY | Facility: CLINIC | Age: 70
End: 2025-04-16
Payer: COMMERCIAL

## 2025-04-16 VITALS
HEART RATE: 74 BPM | DIASTOLIC BLOOD PRESSURE: 60 MMHG | SYSTOLIC BLOOD PRESSURE: 118 MMHG | HEIGHT: 67 IN | WEIGHT: 157 LBS | BODY MASS INDEX: 24.64 KG/M2

## 2025-04-16 DIAGNOSIS — G40.009 PARTIAL IDIOPATHIC EPILEPSY WITH SEIZURES OF LOCALIZED ONSET, NOT INTRACTABLE, WITHOUT STATUS EPILEPTICUS (H): Primary | ICD-10-CM

## 2025-04-16 DIAGNOSIS — G40.009 PARTIAL IDIOPATHIC EPILEPSY WITH SEIZURES OF LOCALIZED ONSET, NOT INTRACTABLE, WITHOUT STATUS EPILEPTICUS (H): ICD-10-CM

## 2025-04-16 LAB
AST SERPL W P-5'-P-CCNC: 27 U/L (ref 0–45)
ERYTHROCYTE [DISTWIDTH] IN BLOOD BY AUTOMATED COUNT: 12.7 % (ref 10–15)
HCT VFR BLD AUTO: 37.5 % (ref 35–47)
HGB BLD-MCNC: 13 G/DL (ref 11.7–15.7)
LEVETIRACETAM SERPL-MCNC: 5.9 ÂΜG/ML (ref 10–40)
MCH RBC QN AUTO: 32.1 PG (ref 26.5–33)
MCHC RBC AUTO-ENTMCNC: 34.7 G/DL (ref 31.5–36.5)
MCV RBC AUTO: 93 FL (ref 78–100)
PHENYTOIN SERPL-MCNC: 12.4 UG/ML
PLATELET # BLD AUTO: 176 10E3/UL (ref 150–450)
RBC # BLD AUTO: 4.05 10E6/UL (ref 3.8–5.2)
WBC # BLD AUTO: 4.9 10E3/UL (ref 4–11)

## 2025-04-16 PROCEDURE — 80185 ASSAY OF PHENYTOIN TOTAL: CPT

## 2025-04-16 PROCEDURE — 85027 COMPLETE CBC AUTOMATED: CPT

## 2025-04-16 PROCEDURE — 84450 TRANSFERASE (AST) (SGOT): CPT

## 2025-04-16 PROCEDURE — 36415 COLL VENOUS BLD VENIPUNCTURE: CPT

## 2025-04-16 PROCEDURE — 80177 DRUG SCRN QUAN LEVETIRACETAM: CPT

## 2025-04-16 RX ORDER — PHENYTOIN SODIUM 100 MG/1
CAPSULE, EXTENDED RELEASE ORAL
Qty: 270 CAPSULE | Refills: 3 | Status: SHIPPED | OUTPATIENT
Start: 2025-04-16

## 2025-04-16 RX ORDER — LEVETIRACETAM 250 MG/1
250 TABLET ORAL 2 TIMES DAILY
Qty: 180 TABLET | Refills: 3 | Status: SHIPPED | OUTPATIENT
Start: 2025-04-16
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
## 2025-04-16 NOTE — NURSING NOTE
Chief Complaint   Patient presents with    Seizures     Annual follow up. Pt denies any seizures.     Carolyn Montoya LPN on 4/16/2025 at 10:47 AM

## 2025-07-25 ENCOUNTER — ANCILLARY PROCEDURE (OUTPATIENT)
Dept: CARDIOLOGY | Facility: CLINIC | Age: 70
End: 2025-07-25
Attending: INTERNAL MEDICINE
Payer: COMMERCIAL

## 2025-07-25 DIAGNOSIS — Z95.0 CARDIAC PACEMAKER IN SITU: ICD-10-CM

## 2025-07-25 DIAGNOSIS — I44.2 COMPLETE ATRIOVENTRICULAR BLOCK (H): ICD-10-CM

## 2025-07-25 DIAGNOSIS — I49.5 SICK SINUS SYNDROME (H): ICD-10-CM

## 2025-07-25 LAB
MDC_IDC_EPISODE_DTM: NORMAL
MDC_IDC_EPISODE_DURATION: 30 S
MDC_IDC_EPISODE_DURATION: 4 S
MDC_IDC_EPISODE_DURATION: 47 S
MDC_IDC_EPISODE_DURATION: 62 S
MDC_IDC_EPISODE_ID: 72
MDC_IDC_EPISODE_ID: 73
MDC_IDC_EPISODE_ID: 74
MDC_IDC_EPISODE_ID: 75
MDC_IDC_EPISODE_TYPE: NORMAL
MDC_IDC_LEAD_CONNECTION_STATUS: NORMAL
MDC_IDC_LEAD_CONNECTION_STATUS: NORMAL
MDC_IDC_LEAD_IMPLANT_DT: NORMAL
MDC_IDC_LEAD_IMPLANT_DT: NORMAL
MDC_IDC_LEAD_LOCATION: NORMAL
MDC_IDC_LEAD_LOCATION: NORMAL
MDC_IDC_LEAD_MFG: NORMAL
MDC_IDC_LEAD_MFG: NORMAL
MDC_IDC_LEAD_MODEL: NORMAL
MDC_IDC_LEAD_MODEL: NORMAL
MDC_IDC_LEAD_POLARITY_TYPE: NORMAL
MDC_IDC_LEAD_POLARITY_TYPE: NORMAL
MDC_IDC_LEAD_SERIAL: NORMAL
MDC_IDC_LEAD_SERIAL: NORMAL
MDC_IDC_MSMT_BATTERY_DTM: NORMAL
MDC_IDC_MSMT_BATTERY_REMAINING_LONGEVITY: 60 MO
MDC_IDC_MSMT_BATTERY_RRT_TRIGGER: 2.62
MDC_IDC_MSMT_BATTERY_STATUS: NORMAL
MDC_IDC_MSMT_BATTERY_VOLTAGE: 2.96 V
MDC_IDC_MSMT_LEADCHNL_RA_IMPEDANCE_VALUE: 361 OHM
MDC_IDC_MSMT_LEADCHNL_RA_IMPEDANCE_VALUE: 399 OHM
MDC_IDC_MSMT_LEADCHNL_RA_PACING_THRESHOLD_AMPLITUDE: 0.75 V
MDC_IDC_MSMT_LEADCHNL_RA_PACING_THRESHOLD_PULSEWIDTH: 0.4 MS
MDC_IDC_MSMT_LEADCHNL_RA_SENSING_INTR_AMPL: 2 MV
MDC_IDC_MSMT_LEADCHNL_RA_SENSING_INTR_AMPL: 2 MV
MDC_IDC_MSMT_LEADCHNL_RV_IMPEDANCE_VALUE: 342 OHM
MDC_IDC_MSMT_LEADCHNL_RV_IMPEDANCE_VALUE: 513 OHM
MDC_IDC_MSMT_LEADCHNL_RV_PACING_THRESHOLD_AMPLITUDE: 1.88 V
MDC_IDC_MSMT_LEADCHNL_RV_PACING_THRESHOLD_PULSEWIDTH: 0.4 MS
MDC_IDC_MSMT_LEADCHNL_RV_SENSING_INTR_AMPL: 13.5 MV
MDC_IDC_MSMT_LEADCHNL_RV_SENSING_INTR_AMPL: 13.5 MV
MDC_IDC_PG_IMPLANT_DTM: NORMAL
MDC_IDC_PG_MFG: NORMAL
MDC_IDC_PG_MODEL: NORMAL
MDC_IDC_PG_SERIAL: NORMAL
MDC_IDC_PG_TYPE: NORMAL
MDC_IDC_SESS_CLINIC_NAME: NORMAL
MDC_IDC_SESS_DTM: NORMAL
MDC_IDC_SESS_TYPE: NORMAL
MDC_IDC_SET_BRADY_AT_MODE_SWITCH_RATE: 171 {BEATS}/MIN
MDC_IDC_SET_BRADY_HYSTRATE: NORMAL
MDC_IDC_SET_BRADY_LOWRATE: 70 {BEATS}/MIN
MDC_IDC_SET_BRADY_MAX_SENSOR_RATE: 145 {BEATS}/MIN
MDC_IDC_SET_BRADY_MAX_TRACKING_RATE: 145 {BEATS}/MIN
MDC_IDC_SET_BRADY_MODE: NORMAL
MDC_IDC_SET_BRADY_PAV_DELAY_LOW: 180 MS
MDC_IDC_SET_BRADY_SAV_DELAY_LOW: 150 MS
MDC_IDC_SET_LEADCHNL_RA_PACING_AMPLITUDE: 1.5 V
MDC_IDC_SET_LEADCHNL_RA_PACING_ANODE_ELECTRODE_1: NORMAL
MDC_IDC_SET_LEADCHNL_RA_PACING_ANODE_LOCATION_1: NORMAL
MDC_IDC_SET_LEADCHNL_RA_PACING_CAPTURE_MODE: NORMAL
MDC_IDC_SET_LEADCHNL_RA_PACING_CATHODE_ELECTRODE_1: NORMAL
MDC_IDC_SET_LEADCHNL_RA_PACING_CATHODE_LOCATION_1: NORMAL
MDC_IDC_SET_LEADCHNL_RA_PACING_POLARITY: NORMAL
MDC_IDC_SET_LEADCHNL_RA_PACING_PULSEWIDTH: 0.4 MS
MDC_IDC_SET_LEADCHNL_RA_SENSING_ANODE_ELECTRODE_1: NORMAL
MDC_IDC_SET_LEADCHNL_RA_SENSING_ANODE_LOCATION_1: NORMAL
MDC_IDC_SET_LEADCHNL_RA_SENSING_CATHODE_ELECTRODE_1: NORMAL
MDC_IDC_SET_LEADCHNL_RA_SENSING_CATHODE_LOCATION_1: NORMAL
MDC_IDC_SET_LEADCHNL_RA_SENSING_POLARITY: NORMAL
MDC_IDC_SET_LEADCHNL_RA_SENSING_SENSITIVITY: 0.6 MV
MDC_IDC_SET_LEADCHNL_RV_PACING_AMPLITUDE: 3.25 V
MDC_IDC_SET_LEADCHNL_RV_PACING_ANODE_ELECTRODE_1: NORMAL
MDC_IDC_SET_LEADCHNL_RV_PACING_ANODE_LOCATION_1: NORMAL
MDC_IDC_SET_LEADCHNL_RV_PACING_CAPTURE_MODE: NORMAL
MDC_IDC_SET_LEADCHNL_RV_PACING_CATHODE_ELECTRODE_1: NORMAL
MDC_IDC_SET_LEADCHNL_RV_PACING_CATHODE_LOCATION_1: NORMAL
MDC_IDC_SET_LEADCHNL_RV_PACING_POLARITY: NORMAL
MDC_IDC_SET_LEADCHNL_RV_PACING_PULSEWIDTH: 0.4 MS
MDC_IDC_SET_LEADCHNL_RV_SENSING_ANODE_ELECTRODE_1: NORMAL
MDC_IDC_SET_LEADCHNL_RV_SENSING_ANODE_LOCATION_1: NORMAL
MDC_IDC_SET_LEADCHNL_RV_SENSING_CATHODE_ELECTRODE_1: NORMAL
MDC_IDC_SET_LEADCHNL_RV_SENSING_CATHODE_LOCATION_1: NORMAL
MDC_IDC_SET_LEADCHNL_RV_SENSING_POLARITY: NORMAL
MDC_IDC_SET_LEADCHNL_RV_SENSING_SENSITIVITY: 0.9 MV
MDC_IDC_SET_ZONE_DETECTION_INTERVAL: 350 MS
MDC_IDC_SET_ZONE_DETECTION_INTERVAL: 400 MS
MDC_IDC_SET_ZONE_STATUS: NORMAL
MDC_IDC_SET_ZONE_STATUS: NORMAL
MDC_IDC_SET_ZONE_TYPE: NORMAL
MDC_IDC_SET_ZONE_VENDOR_TYPE: NORMAL
MDC_IDC_STAT_AT_BURDEN_PERCENT: 0 %
MDC_IDC_STAT_AT_DTM_END: NORMAL
MDC_IDC_STAT_AT_DTM_START: NORMAL
MDC_IDC_STAT_BRADY_AP_VP_PERCENT: 17.99 %
MDC_IDC_STAT_BRADY_AP_VS_PERCENT: 81.22 %
MDC_IDC_STAT_BRADY_AS_VP_PERCENT: 0.16 %
MDC_IDC_STAT_BRADY_AS_VS_PERCENT: 0.64 %
MDC_IDC_STAT_BRADY_DTM_END: NORMAL
MDC_IDC_STAT_BRADY_DTM_START: NORMAL
MDC_IDC_STAT_BRADY_RA_PERCENT_PACED: 98.92 %
MDC_IDC_STAT_BRADY_RV_PERCENT_PACED: 18.15 %
MDC_IDC_STAT_EPISODE_RECENT_COUNT: 0
MDC_IDC_STAT_EPISODE_RECENT_COUNT: 1
MDC_IDC_STAT_EPISODE_RECENT_COUNT: 3
MDC_IDC_STAT_EPISODE_RECENT_COUNT_DTM_END: NORMAL
MDC_IDC_STAT_EPISODE_RECENT_COUNT_DTM_START: NORMAL
MDC_IDC_STAT_EPISODE_TOTAL_COUNT: 0
MDC_IDC_STAT_EPISODE_TOTAL_COUNT: 20
MDC_IDC_STAT_EPISODE_TOTAL_COUNT: 55
MDC_IDC_STAT_EPISODE_TOTAL_COUNT_DTM_END: NORMAL
MDC_IDC_STAT_EPISODE_TOTAL_COUNT_DTM_START: NORMAL
MDC_IDC_STAT_EPISODE_TYPE: NORMAL
MDC_IDC_STAT_TACHYTHERAPY_RECENT_DTM_END: NORMAL
MDC_IDC_STAT_TACHYTHERAPY_RECENT_DTM_START: NORMAL
MDC_IDC_STAT_TACHYTHERAPY_TOTAL_DTM_END: NORMAL
MDC_IDC_STAT_TACHYTHERAPY_TOTAL_DTM_START: NORMAL

## 2025-07-25 PROCEDURE — 93294 REM INTERROG EVL PM/LDLS PM: CPT | Performed by: INTERNAL MEDICINE

## 2025-07-25 PROCEDURE — 93296 REM INTERROG EVL PM/IDS: CPT | Performed by: INTERNAL MEDICINE

## 2025-07-30 ENCOUNTER — TRANSCRIBE ORDERS (OUTPATIENT)
Dept: OTHER | Age: 70
End: 2025-07-30

## 2025-07-30 ENCOUNTER — TRANSCRIBE ORDERS (OUTPATIENT)
Dept: PHARMACY | Facility: PHYSICIAN GROUP | Age: 70
End: 2025-07-30
Payer: COMMERCIAL

## 2025-07-30 ENCOUNTER — MEDICAL CORRESPONDENCE (OUTPATIENT)
Dept: HEALTH INFORMATION MANAGEMENT | Facility: CLINIC | Age: 70
End: 2025-07-30

## 2025-07-30 ENCOUNTER — LAB REQUISITION (OUTPATIENT)
Dept: LAB | Facility: CLINIC | Age: 70
End: 2025-07-30
Payer: COMMERCIAL

## 2025-07-30 ENCOUNTER — TRANSFERRED RECORDS (OUTPATIENT)
Dept: HEALTH INFORMATION MANAGEMENT | Facility: CLINIC | Age: 70
End: 2025-07-30

## 2025-07-30 DIAGNOSIS — I10 ESSENTIAL (PRIMARY) HYPERTENSION: ICD-10-CM

## 2025-07-30 DIAGNOSIS — E03.9 HYPOTHYROIDISM, UNSPECIFIED: ICD-10-CM

## 2025-07-30 DIAGNOSIS — M54.9 MID BACK PAIN ON LEFT SIDE: Primary | ICD-10-CM

## 2025-07-30 DIAGNOSIS — E78.2 MIXED HYPERLIPIDEMIA: ICD-10-CM

## 2025-07-30 LAB
BASOPHILS # BLD AUTO: 0 10E3/UL (ref 0–0.2)
BASOPHILS NFR BLD AUTO: 0 %
EOSINOPHIL # BLD AUTO: 0.2 10E3/UL (ref 0–0.7)
EOSINOPHIL NFR BLD AUTO: 5 %
ERYTHROCYTE [DISTWIDTH] IN BLOOD BY AUTOMATED COUNT: 13 % (ref 10–15)
HCT VFR BLD AUTO: 40.2 % (ref 35–47)
HGB BLD-MCNC: 13.9 G/DL (ref 11.7–15.7)
IMM GRANULOCYTES # BLD: 0 10E3/UL
IMM GRANULOCYTES NFR BLD: 0 %
LYMPHOCYTES # BLD AUTO: 2.1 10E3/UL (ref 0.8–5.3)
LYMPHOCYTES NFR BLD AUTO: 39 %
MCH RBC QN AUTO: 31.3 PG (ref 26.5–33)
MCHC RBC AUTO-ENTMCNC: 34.6 G/DL (ref 31.5–36.5)
MCV RBC AUTO: 91 FL (ref 78–100)
MONOCYTES # BLD AUTO: 0.5 10E3/UL (ref 0–1.3)
MONOCYTES NFR BLD AUTO: 8 %
NEUTROPHILS # BLD AUTO: 2.5 10E3/UL (ref 1.6–8.3)
NEUTROPHILS NFR BLD AUTO: 47 %
NRBC # BLD AUTO: 0 10E3/UL
NRBC BLD AUTO-RTO: 0 /100
PLATELET # BLD AUTO: 179 10E3/UL (ref 150–450)
RBC # BLD AUTO: 4.44 10E6/UL (ref 3.8–5.2)
WBC # BLD AUTO: 5.4 10E3/UL (ref 4–11)

## 2025-07-30 PROCEDURE — 85025 COMPLETE CBC W/AUTO DIFF WBC: CPT | Mod: ORL | Performed by: FAMILY MEDICINE

## 2025-07-30 PROCEDURE — 80061 LIPID PANEL: CPT | Mod: ORL | Performed by: FAMILY MEDICINE

## 2025-07-30 PROCEDURE — 80053 COMPREHEN METABOLIC PANEL: CPT | Mod: ORL | Performed by: FAMILY MEDICINE

## 2025-07-30 PROCEDURE — 84443 ASSAY THYROID STIM HORMONE: CPT | Mod: ORL | Performed by: FAMILY MEDICINE

## 2025-07-31 LAB
ALBUMIN SERPL BCG-MCNC: 4.2 G/DL (ref 3.5–5.2)
ALP SERPL-CCNC: 103 U/L (ref 40–150)
ALT SERPL W P-5'-P-CCNC: 20 U/L (ref 0–50)
ANION GAP SERPL CALCULATED.3IONS-SCNC: 16 MMOL/L (ref 7–15)
AST SERPL W P-5'-P-CCNC: 29 U/L (ref 0–45)
BILIRUB SERPL-MCNC: 0.4 MG/DL
BUN SERPL-MCNC: 15.5 MG/DL (ref 8–23)
CALCIUM SERPL-MCNC: 9.8 MG/DL (ref 8.8–10.4)
CHLORIDE SERPL-SCNC: 101 MMOL/L (ref 98–107)
CHOLEST SERPL-MCNC: 196 MG/DL
CREAT SERPL-MCNC: 0.85 MG/DL (ref 0.51–0.95)
EGFRCR SERPLBLD CKD-EPI 2021: 73 ML/MIN/1.73M2
FASTING STATUS PATIENT QL REPORTED: ABNORMAL
FASTING STATUS PATIENT QL REPORTED: ABNORMAL
GLUCOSE SERPL-MCNC: 125 MG/DL (ref 70–99)
HCO3 SERPL-SCNC: 20 MMOL/L (ref 22–29)
HDLC SERPL-MCNC: 74 MG/DL
LDLC SERPL CALC-MCNC: 101 MG/DL
NONHDLC SERPL-MCNC: 122 MG/DL
POTASSIUM SERPL-SCNC: 3 MMOL/L (ref 3.4–5.3)
PROT SERPL-MCNC: 7 G/DL (ref 6.4–8.3)
SODIUM SERPL-SCNC: 137 MMOL/L (ref 135–145)
TRIGL SERPL-MCNC: 104 MG/DL
TSH SERPL DL<=0.005 MIU/L-ACNC: 1.24 UIU/ML (ref 0.3–4.2)

## 2025-08-05 ENCOUNTER — PATIENT OUTREACH (OUTPATIENT)
Dept: CARE COORDINATION | Facility: CLINIC | Age: 70
End: 2025-08-05
Payer: COMMERCIAL